# Patient Record
Sex: FEMALE | Race: WHITE | NOT HISPANIC OR LATINO | Employment: FULL TIME | URBAN - METROPOLITAN AREA
[De-identification: names, ages, dates, MRNs, and addresses within clinical notes are randomized per-mention and may not be internally consistent; named-entity substitution may affect disease eponyms.]

---

## 2017-02-17 LAB — PAP SMEAR - HIM PATIENT REPORTED: NEGATIVE

## 2017-11-16 ENCOUNTER — TRANSFERRED RECORDS (OUTPATIENT)
Dept: HEALTH INFORMATION MANAGEMENT | Facility: CLINIC | Age: 27
End: 2017-11-16

## 2017-11-16 LAB — HEP C HIM: NORMAL

## 2017-11-19 ENCOUNTER — OFFICE VISIT (OUTPATIENT)
Dept: URGENT CARE | Facility: URGENT CARE | Age: 27
End: 2017-11-19
Payer: COMMERCIAL

## 2017-11-19 VITALS
HEART RATE: 90 BPM | TEMPERATURE: 98.3 F | SYSTOLIC BLOOD PRESSURE: 107 MMHG | DIASTOLIC BLOOD PRESSURE: 65 MMHG | WEIGHT: 124.6 LBS | OXYGEN SATURATION: 98 %

## 2017-11-19 DIAGNOSIS — N90.60 LABIA ENLARGED: ICD-10-CM

## 2017-11-19 DIAGNOSIS — L30.9 DERMATITIS: Primary | ICD-10-CM

## 2017-11-19 PROCEDURE — 99213 OFFICE O/P EST LOW 20 MIN: CPT | Performed by: NURSE PRACTITIONER

## 2017-11-19 RX ORDER — NICOTINE POLACRILEX 2 MG
GUM BUCCAL
COMMUNITY
End: 2023-05-24

## 2017-11-19 RX ORDER — MULTIPLE VITAMINS W/ MINERALS TAB 9MG-400MCG
1 TAB ORAL DAILY
COMMUNITY
End: 2019-05-01

## 2017-11-19 RX ORDER — TRIAMCINOLONE ACETONIDE 1 MG/G
OINTMENT TOPICAL
Qty: 30 G | Refills: 0 | Status: SHIPPED | OUTPATIENT
Start: 2017-11-19 | End: 2021-12-22

## 2017-11-19 NOTE — MR AVS SNAPSHOT
"              After Visit Summary   2017    Bess Ellis    MRN: [id number]           Patient Information     Date Of Birth          1990        Visit Information        Provider Department      2017 11:40 AM Taty Glaser APRN CNP [facility]        Today's Diagnoses     Dermatitis    -  1    Labia enlarged           Follow-ups after your visit        Who to contact     If you have questions or need follow up information about today's clinic visit or your schedule please contact [facility] directly at [phone].  Normal or non-critical lab and imaging results will be communicated to you by [employer], letter or phone within 4 business days after the clinic has received the results. If you do not hear from us within 7 days, please contact the clinic through [employer] or phone. If you have a critical or abnormal lab result, we will notify you by phone as soon as possible.  Submit refill requests through [employer] or call your pharmacy and they will forward the refill request to us. Please allow 3 business days for your refill to be completed.          Additional Information About Your Visit        MyChart Information     [employer] lets you send messages to your doctor, view your test results, renew your prescriptions, schedule appointments and more. To sign up, go to [email or web address] . Click on \"Log in\" on the left side of the screen, which will take you to the Welcome page. Then click on \"Sign up Now\" on the right side of the page.     You will be asked to enter the access code listed below, as well as some personal information. Please follow the directions to create your username and password.     Your access code is: [email or web address]  Expires: 2018  5:31 AM     Your access code will  in 90 days. If you need help or a new code, please call your [facility] clinic or [phone].        Care EveryWhere ID     This is your Care " EveryWhere ID. This could be used by other organizations to access your [facility] medical records  [id number]        Your Vitals Were     Pulse Temperature Pulse Oximetry             90 98.3  F (36.8  C) (Oral) 98%          Blood Pressure from Last 3 Encounters:   11/19/17 107/65    Weight from Last 3 Encounters:   11/19/17 124 lb 9.6 oz (56.5 kg)              Today, you had the following     No orders found for display         Today's Medication Changes          These changes are accurate as of: 11/19/17  4:31 PM.  If you have any questions, ask your nurse or doctor.               Start taking these medicines.        Dose/Directions    triamcinolone 0.1 % ointment   Commonly known as:  KENALOG   Used for:  Dermatitis   Started by:  Taty Glaser APRN CNP        Apply sparingly to affected area three times daily for 14 days.   Quantity:  30 g   Refills:  0            Where to get your medicines      These medications were sent to [facility] [address] [place] [address] [address] [facility]  [address], [facility] [address]    Hours:  24-hours Phone:  [phone]     triamcinolone 0.1 % ointment                Primary Care Provider    Physician No Ref-Primary       NO REF-PRIMARY PHYSICIAN        Equal Access to Services     JIM PATEL AH: Hadii juliet ku hadasho Soomaali, waaxda luqadaha, qaybta kaalmada adeegyada, waxay kelsey lopez. So [facility] [phone].    ATENCIÓN: Si habla español, tiene a kiran disposición servicios gratuitos de asistencia lingüística. Llame al [phone].    We comply with applicable federal civil rights laws and [place] laws. We do not discriminate on the basis of race, color, national origin, age, disability, sex, sexual orientation, or gender identity.            Thank you!     Thank you for choosing [facility]  for your care. Our goal is always to provide you with excellent care. Hearing  back from our patients is one way we can continue to improve our services. Please take a few minutes to complete the written survey that you may receive in the mail after your visit with us. Thank you!             Your Updated Medication List - Protect others around you: Learn how to safely use, store and throw away your medicines at www.disposemymeds.org.          This list is accurate as of: 11/19/17  4:31 PM.  Always use your most recent med list.                   Brand Name Dispense Instructions for use Diagnosis    Biotin 1 MG Caps           Multi-vitamin Tabs tablet      Take 1 tablet by mouth daily        PROBIOTIC DAILY PO           triamcinolone 0.1 % ointment    KENALOG    30 g    Apply sparingly to affected area three times daily for 14 days.    Dermatitis

## 2017-11-19 NOTE — PROGRESS NOTES
SUBJECTIVE:   Bess Ellis is a 27 year old female who presents to clinic today for the following health issues:    CC: Bess presents today with new onset unilateral labial swelling and pain since intercourse early this morning.     HPI: Bess states that she went out with friends and had 3-4 barb's last night over the course of 4 hours, which she states is a typical amount for her, not excessive. She then had consensual, rough intercourse with a partner around 3 am (not a brand new partner, has had consensual intercourse once in the past). When she woke up at 5 am she noted that she had bilateral painful, labial swelling and had some dizziness. She drank some fluids and dizziness has since resolved, but she notes that she still has unilateral labial swelling and pain that has never happened before.     Problem list and histories reviewed & adjusted, as indicated.  Additional history: none    Reviewed and updated as needed this visit by clinical staff  Tobacco  Allergies       Reviewed and updated as needed this visit by Provider         ROS:  C: NEGATIVE for fever, chills, change in weight  E/M: NEGATIVE for ear, mouth and throat problems  R: NEGATIVE for significant cough or SOB  CV: NEGATIVE for chest pain, palpitations or peripheral edema  : negative for, dysuria, urinary tract infection and bleeding  MUSCULOSKELETAL: NEGATIVE for significant arthralgias or myalgia  NEURO: POSITIVE for dizziness/lightheadedness earlier this morning, since resoved  ENDOCRINE: NEGATIVE for temperature intolerance, skin/hair changes  PSYCHIATRIC: NEGATIVE for changes in mood or affect    OBJECTIVE:     /65  Pulse 90  Temp 98.3  F (36.8  C) (Oral)  Wt 124 lb 9.6 oz (56.5 kg)  SpO2 98%  There is no height or weight on file to calculate BMI.   GENERAL: healthy, alert and no distress  RESP: regular rate & rhythm, breathing unlabored  CV: regular rates and rhythm and no peripheral edema   (female): extensive left  labia minor edema, mild left labia majora edema, moderate superior internal vaginal edema - some associated erythema, no overt lacerations, lesions, or abnormal discharge.   MS: no gross musculoskeletal defects noted, no edema  SKIN: no suspicious lesions or rashes  NEURO: Normal strength and tone, mentation intact and speech normal  PSYCH: mentation appears normal, affect normal/bright    Diagnostic Test Results:  none     ASSESSMENT/PLAN:     Problem List Items Addressed This Visit        Urinary    Labia enlarged      Other Visit Diagnoses     Dermatitis    -  Primary    Relevant Medications    triamcinolone (KENALOG) 0.1 % ointment         Vaginal exam demonstrated unilateral left labial edema & mild erythema, no overt lacerations, lesions, or infection identified. Differential diagnoses include: irritation, less likely infection.    Prescribed triamcinolone cream for application to external swollen labia, pelvic elevation as much as possible and application of ice to genital region.     Bess states she just saw her gynecologist on Thursday - advised to return to gynecology if labial swelling worsens or does not improve in 1-2 days.     Encouraged to continue drinking fluids - no other overt concerns for dizziness as it has since resolved and likely related to dehydration s/t alcohol intake and anxiety with labial swelling.    Deepika Gutierrez, MALINDA student, acted as a scribe for the above evaluation completed by LAURE Weiss CNP.    I have reviewed and edited the NP student's documentation acting as scribe and verify that making the history, exam and medical decision making reflect the history, exam and medical decision making preformed by myself today.       LAURE Segundo CNP  [facility]

## 2017-11-19 NOTE — NURSING NOTE
Chief Complaint   Patient presents with     Vaginal Problem     Swelling x noticed this morning around 5 am. Pt states that she had intercorse yesterday. Pt also complains of feeling dizzy and vomiting this moring.      Diarrhea     x today       Initial /65  Pulse 90  Temp 98.3  F (36.8  C) (Oral)  Wt 124 lb 9.6 oz (56.5 kg)  SpO2 98% There is no height or weight on file to calculate BMI.  Medication Reconciliation: complete

## 2018-02-28 ENCOUNTER — OFFICE VISIT (OUTPATIENT)
Dept: FAMILY MEDICINE | Facility: CLINIC | Age: 28
End: 2018-02-28
Payer: COMMERCIAL

## 2018-02-28 ENCOUNTER — RESULT FOLLOW UP (OUTPATIENT)
Dept: FAMILY MEDICINE | Facility: CLINIC | Age: 28
End: 2018-02-28

## 2018-02-28 VITALS
HEIGHT: 60 IN | SYSTOLIC BLOOD PRESSURE: 119 MMHG | BODY MASS INDEX: 23.25 KG/M2 | WEIGHT: 118.4 LBS | TEMPERATURE: 97.7 F | HEART RATE: 68 BPM | DIASTOLIC BLOOD PRESSURE: 72 MMHG | OXYGEN SATURATION: 98 %

## 2018-02-28 DIAGNOSIS — Z80.6 FAMILY HISTORY OF CLL (CHRONIC LYMPHOID LEUKEMIA): ICD-10-CM

## 2018-02-28 DIAGNOSIS — Z97.5 IUD CONTRACEPTION: ICD-10-CM

## 2018-02-28 DIAGNOSIS — N90.60 LABIA ENLARGED: ICD-10-CM

## 2018-02-28 DIAGNOSIS — R87.610 ASCUS WITH POSITIVE HIGH RISK HPV CERVICAL: ICD-10-CM

## 2018-02-28 DIAGNOSIS — K58.8 OTHER IRRITABLE BOWEL SYNDROME: ICD-10-CM

## 2018-02-28 DIAGNOSIS — R87.810 ASCUS WITH POSITIVE HIGH RISK HPV CERVICAL: ICD-10-CM

## 2018-02-28 DIAGNOSIS — Z11.3 SCREEN FOR STD (SEXUALLY TRANSMITTED DISEASE): ICD-10-CM

## 2018-02-28 DIAGNOSIS — Z80.6: ICD-10-CM

## 2018-02-28 DIAGNOSIS — Z00.00 ENCOUNTER FOR ROUTINE ADULT HEALTH EXAMINATION WITHOUT ABNORMAL FINDINGS: Primary | ICD-10-CM

## 2018-02-28 LAB
BASOPHILS # BLD AUTO: 0 10E9/L (ref 0–0.2)
BASOPHILS NFR BLD AUTO: 0.2 %
DIFFERENTIAL METHOD BLD: NORMAL
EOSINOPHIL # BLD AUTO: 0.1 10E9/L (ref 0–0.7)
EOSINOPHIL NFR BLD AUTO: 1.4 %
ERYTHROCYTE [DISTWIDTH] IN BLOOD BY AUTOMATED COUNT: 12.2 % (ref 10–15)
HCT VFR BLD AUTO: 44.2 % (ref 35–47)
HGB BLD-MCNC: 15.7 G/DL (ref 11.7–15.7)
LYMPHOCYTES # BLD AUTO: 1.5 10E9/L (ref 0.8–5.3)
LYMPHOCYTES NFR BLD AUTO: 26.6 %
MCH RBC QN AUTO: 32.4 PG (ref 26.5–33)
MCHC RBC AUTO-ENTMCNC: 35.5 G/DL (ref 31.5–36.5)
MCV RBC AUTO: 91 FL (ref 78–100)
MONOCYTES # BLD AUTO: 0.5 10E9/L (ref 0–1.3)
MONOCYTES NFR BLD AUTO: 8.7 %
NEUTROPHILS # BLD AUTO: 3.5 10E9/L (ref 1.6–8.3)
NEUTROPHILS NFR BLD AUTO: 63.1 %
PLATELET # BLD AUTO: 203 10E9/L (ref 150–450)
RBC # BLD AUTO: 4.84 10E12/L (ref 3.8–5.2)
WBC # BLD AUTO: 5.5 10E9/L (ref 4–11)

## 2018-02-28 PROCEDURE — 36415 COLL VENOUS BLD VENIPUNCTURE: CPT | Performed by: FAMILY MEDICINE

## 2018-02-28 PROCEDURE — 85025 COMPLETE CBC W/AUTO DIFF WBC: CPT | Performed by: FAMILY MEDICINE

## 2018-02-28 PROCEDURE — 87491 CHLMYD TRACH DNA AMP PROBE: CPT | Performed by: FAMILY MEDICINE

## 2018-02-28 PROCEDURE — 99385 PREV VISIT NEW AGE 18-39: CPT | Performed by: FAMILY MEDICINE

## 2018-02-28 PROCEDURE — G0124 SCREEN C/V THIN LAYER BY MD: HCPCS | Performed by: FAMILY MEDICINE

## 2018-02-28 PROCEDURE — 87389 HIV-1 AG W/HIV-1&-2 AB AG IA: CPT | Performed by: FAMILY MEDICINE

## 2018-02-28 PROCEDURE — 87591 N.GONORRHOEAE DNA AMP PROB: CPT | Performed by: FAMILY MEDICINE

## 2018-02-28 PROCEDURE — 86780 TREPONEMA PALLIDUM: CPT | Performed by: FAMILY MEDICINE

## 2018-02-28 PROCEDURE — 87624 HPV HI-RISK TYP POOLED RSLT: CPT | Performed by: FAMILY MEDICINE

## 2018-02-28 PROCEDURE — G0145 SCR C/V CYTO,THINLAYER,RESCR: HCPCS | Performed by: FAMILY MEDICINE

## 2018-02-28 NOTE — MR AVS SNAPSHOT
After Visit Summary   2/28/2018    Bess Ellis    MRN: [id number]           Patient Information     Date Of Birth          1990        Visit Information        Provider Department      2/28/2018 8:30 AM Tati Gruber MD [facility]        Today's Diagnoses     Encounter for routine adult health examination without abnormal findings    -  1    IUD contraception        Screen for STD (sexually transmitted disease)          Care Instructions      Preventive Health Recommendations  Female Ages 26 - 39  Yearly exam:   See your health care provider every year in order to    Review health changes.     Discuss preventive care.      Review your medicines if you your doctor has prescribed any.    Until age 30: Get a Pap test every three years (more often if you have had an abnormal result).    After age 30: Talk to your doctor about whether you should have a Pap test every 3 years or have a Pap test with HPV screening every 5 years.   You do not need a Pap test if your uterus was removed (hysterectomy) and you have not had cancer.  You should be tested each year for STDs (sexually transmitted diseases), if you're at risk.   Talk to your provider about how often to have your cholesterol checked.  If you are at risk for diabetes, you should have a diabetes test (fasting glucose).  Shots: Get a flu shot each year. Get a tetanus shot every 10 years.   Nutrition:     Eat at least 5 servings of fruits and vegetables each day.    Eat whole-grain bread, whole-wheat pasta and brown rice instead of white grains and rice.    Talk to your provider about Calcium and Vitamin D.     Lifestyle    Exercise at least 150 minutes a week (30 minutes a day, 5 days of the week). This will help you control your weight and prevent disease.    Limit alcohol to one drink per day.    No smoking.     Wear sunscreen to prevent skin cancer.    See your dentist every six months for an exam and cleaning.             "Follow-ups after your visit        Who to contact     If you have questions or need follow up information about today's clinic visit or your schedule please contact [facility] directly at [phone].  Normal or non-critical lab and imaging results will be communicated to you by MyChart, letter or phone within 4 business days after the clinic has received the results. If you do not hear from us within 7 days, please contact the clinic through MyChart or phone. If you have a critical or abnormal lab result, we will notify you by phone as soon as possible.  Submit refill requests through [employer] or call your pharmacy and they will forward the refill request to us. Please allow 3 business days for your refill to be completed.          Additional Information About Your Visit        [employer] Information     [employer] lets you send messages to your doctor, view your test results, renew your prescriptions, schedule appointments and more. To sign up, go to [email or web address] . Click on \"Log in\" on the left side of the screen, which will take you to the Welcome page. Then click on \"Sign up Now\" on the right side of the page.     You will be asked to enter the access code listed below, as well as some personal information. Please follow the directions to create your username and password.     Your access code is: [email or web address]  Expires: 2018  9:05 AM     Your access code will  in 90 days. If you need help or a new code, please call your [facility] clinic or [phone].        Care EveryWhere ID     This is your Care EveryWhere ID. This could be used by other organizations to access your [facility] medical records  [id number]        Your Vitals Were     Pulse Temperature Height Pulse Oximetry Breastfeeding? BMI (Body Mass Index)    68 97.7  F (36.5  C) (Oral) 5' 0.43\" (1.535 m) 98% No 22.79 kg/m2       Blood Pressure from Last 3 Encounters:   18 119/72   17 107/65    Weight from Last 3 " Encounters:   02/28/18 118 lb 6.4 oz (53.7 kg)   11/19/17 124 lb 9.6 oz (56.5 kg)              We Performed the Following     Anti Treponema     CHLAMYDIA TRACHOMATIS PCR     HIV Antigen Antibody Combo     NEISSERIA GONORRHOEA PCR     PAP imaged thin layer screen reflex to HPV if ASCUS - recommended age 25 - 29 years        Primary Care Provider Fax #    Physician No Ref-Primary [phone]       No address on file        Equal Access to Services     JIM PATEL : Hadii aad ku hadasho Soomaali, waaxda luqadaha, qaybta kaalmada adeegyada, waxay deenain janan stef irishdiego yepez . So [facility] [phone].    ATENCIÓN: Si salty jaramillo, tiene a kiran disposición servicios gratuitos de asistencia lingüística. Llame al [phone].    We comply with applicable federal civil rights laws and [place] laws. We do not discriminate on the basis of race, color, national origin, age, disability, sex, sexual orientation, or gender identity.            Thank you!     Thank you for choosing [facility]  for your care. Our goal is always to provide you with excellent care. Hearing back from our patients is one way we can continue to improve our services. Please take a few minutes to complete the written survey that you may receive in the mail after your visit with us. Thank you!             Your Updated Medication List - Protect others around you: Learn how to safely use, store and throw away your medicines at www.disposemymeds.org.          This list is accurate as of 2/28/18  9:05 AM.  Always use your most recent med list.                   Brand Name Dispense Instructions for use Diagnosis    Biotin 1 MG Caps           Multi-vitamin Tabs tablet      Take 1 tablet by mouth daily        PROBIOTIC DAILY PO           triamcinolone 0.1 % ointment    KENALOG    30 g    Apply sparingly to affected area three times daily for 14 days.    Dermatitis

## 2018-02-28 NOTE — NURSING NOTE
"Chief Complaint   Patient presents with     Physical     /72  Pulse 68  Temp 97.7  F (36.5  C) (Oral)  Ht 5' 0.43\" (1.535 m)  Wt 118 lb 6.4 oz (53.7 kg)  SpO2 98%  Breastfeeding? No  BMI 22.79 kg/m2 Estimated body mass index is 22.79 kg/(m^2) as calculated from the following:    Height as of this encounter: 5' 0.43\" (1.535 m).    Weight as of this encounter: 118 lb 6.4 oz (53.7 kg).  BP completed using cuff size: regular       Health Maintenance due pending provider review:  Pap Smear    PAP--Possibly completing today, per Provider review    Justina Crenshaw MA  "

## 2018-02-28 NOTE — PROGRESS NOTES
SUBJECTIVE:   CC: Bess Ellis is an 28 year old woman who presents for preventive health visit.     Physical   Annual:     Getting at least 3 servings of Calcium per day::  Yes    Bi-annual eye exam::  Yes    Dental care twice a year::  Yes    Sleep apnea or symptoms of sleep apnea::  None    Diet::  Regular (no restrictions)    Frequency of exercise::  4-5 days/week    Duration of exercise::  45-60 minutes    Taking medications regularly::  Yes    Medication side effects::  None    Additional concerns today::  YES (STI testing)            New patient, establishing care.    --Health Maintenance-  Last pap likely ~3 yrs ago- in [place].    Was seen most recently at [facility] in Women's health- pap not done there as she wasn't due.  Last Tdap- thinks she's UTD.  Did do the HPV shots.  Did get the flu shot this year.  She would like to get STD screening.  Does it frequently, last a few months ago- negative.    Other health issues...    --GI sxs - now improved.  Sx's- much improved now, but was having diarrhea, colon spasms, more gas/bloating.  In past couple yrs, on/off GI problems with BM (intense/bad smelling- thinks it was caused by food, veggie burgers from  [employer]).  Saw GI recently ([facility]), no dx.    Did food allergy testing at [facility]- all negative.  GI - tested neg for celiac.  Rec fiber supplementation.  Did do the fiber supplementation- citrucel.   Also changed to priobiotics that were more GI related.    --Vaginal infection s/p IUD placement- now resolved.  Was seeing gyn due to vaginal issues, IUD.  Had bad yeast infection after IUD.    [facility]  Yeast infection x first 6 months.  Didn't clear with multiple diflucan, then topical which helped, but then UTI.  Now fine other than short strings.  Minimal bleeding, occasionally cramping, q5-6 wks.          Today's PHQ-2 Score:   PHQ-2 ( 1999 Pfizer) 2/28/2018   Q1: Little interest or pleasure in doing things 0    Q2: Feeling down, depressed or hopeless 0   PHQ-2 Score 0   Q1: Little interest or pleasure in doing things Not at all   Q2: Feeling down, depressed or hopeless Not at all   PHQ-2 Score 0     Abuse: Current or Past(Physical, Sexual or Emotional)- No  Do you feel safe in your environment - Yes    Social History   Substance Use Topics     Smoking status: Never Smoker     Smokeless tobacco: Never Used     Alcohol use Yes      Comment: 1-2 times a week couple drinks      No flowsheet data found.    Reviewed orders with patient.  Reviewed health maintenance and updated orders accordingly - Yes  Labs reviewed in EPIC  BP Readings from Last 3 Encounters:   18 119/72   17 107/65    Wt Readings from Last 3 Encounters:   18 118 lb 6.4 oz (53.7 kg)   17 124 lb 9.6 oz (56.5 kg)                  Patient Active Problem List   Diagnosis     Labia enlarged     IUD contraception     Past Surgical History:   Procedure Laterality Date     BIOPSY OF SKIN LESION  2017    atypical nevus, benign     LASIK  2013       Social History   Substance Use Topics     Smoking status: Never Smoker     Smokeless tobacco: Never Used     Alcohol use Yes      Comment: 1-2 times a week couple drinks      Family History   Problem Relation Age of Onset     CANCER Mother 57     CLL, donig well, no txt     GASTROINTESTINAL DISEASE Mother      IBS     GASTROINTESTINAL DISEASE Sister      IBS     Depression Sister      CANCER Maternal Grandmother 65     CML, had txt,  at 90, unsure cause of death     Coronary Artery Disease Maternal Grandfather      later in life     Colon Cancer Paternal Grandmother      60s     Coronary Artery Disease Paternal Grandfather      later in life     Colon Cancer Paternal Grandfather      60s         Current Outpatient Prescriptions   Medication Sig Dispense Refill     Biotin 1 MG CAPS        Probiotic Product (PROBIOTIC DAILY PO)        multivitamin, therapeutic with minerals (MULTI-VITAMIN)  "TABS tablet Take 1 tablet by mouth daily       triamcinolone (KENALOG) 0.1 % ointment Apply sparingly to affected area three times daily for 14 days. 30 g 0     No Known Allergies  No lab results found.     Mammogram not appropriate for this patient based on age.    Pertinent mammograms are reviewed under the imaging tab.  History of abnormal Pap smear: NO - age 21-29 PAP every 3 years recommended    Reviewed and updated as needed this visit by clinical staff  Tobacco  Allergies  Meds  Problems  Fam Hx  Soc Hx        Reviewed and updated as needed this visit by Provider  Allergies  Meds  Problems            Review of Systems  10 point ROS of systems including Constitutional, Eyes, Respiratory, Cardiovascular, Gastroenterology, Genitourinary, Integumentary, Muscularskeletal, Psychiatric were all negative except for pertinent positives noted in my HPI.       OBJECTIVE:   /72  Pulse 68  Temp 97.7  F (36.5  C) (Oral)  Ht 5' 0.43\" (1.535 m)  Wt 118 lb 6.4 oz (53.7 kg)  SpO2 98%  Breastfeeding? No  BMI 22.79 kg/m2  Physical Exam  GENERAL: healthy, alert and no distress  EYES: Eyes grossly normal to inspection, PERRL and conjunctivae and sclerae normal  HENT: ear canals and TM's normal, nose and mouth without ulcers or lesions  NECK: no adenopathy, no asymmetry, masses, or scars and thyroid normal to palpation  RESP: lungs clear to auscultation - no rales, rhonchi or wheezes  BREAST: normal without masses, tenderness or nipple discharge and no palpable axillary masses or adenopathy  CV: regular rate and rhythm, normal S1 S2, no S3 or S4, no murmur, click or rub, no peripheral edema and peripheral pulses strong  ABDOMEN: soft, nontender, no hepatosplenomegaly, no masses and bowel sounds normal   (female): normal female external genitalia, normal urethral meatus, vaginal mucosa pink, moist, well rugated, and normal cervix/adnexa/uterus without masses or discharge  MS: no gross musculoskeletal defects " "noted, no edema  SKIN: no suspicious lesions or rashes  NEURO: Normal strength and tone, mentation intact and speech normal  PSYCH: mentation appears normal, affect normal/bright    ASSESSMENT/PLAN:       ICD-10-CM    1. Encounter for routine adult health examination without abnormal findings Z00.00 PAP imaged thin layer screen reflex to HPV if ASCUS - recommended age 25 - 29 years   2. IUD contraception Z97.5    3. Screen for STD (sexually transmitted disease) Z11.3 NEISSERIA GONORRHOEA PCR     CHLAMYDIA TRACHOMATIS PCR     HIV Antigen Antibody Combo     Anti Treponema   4. Family history of CLL (chronic lymphoid leukemia) Z80.6 CBC with platelets and differential   5. Family history of chronic myeloid leukemia Z80.6 CBC with platelets and differential     CPE- Discussed diet, calcium and exercise.  Went over Self Breast Exam.  Thin prep pap was done.  Eyes and Teeth or UTD or recommended f/u.  No immunizations needed today.  See orders below for tests ordered and screening needed.  Will check STD screen today- no sx's.    GI sx's- mostly resolved, neg testing for celiac at GI, negative food allergy testing at allergy.  Pt unsure reason- could be diet changes or switch to different probiotic or something else.  Sx's mostly c/w IBS, and pt does have fam h/o IBS.    IUD- mirena working well now that initial yeast infection has cleared.  Strings almost 1cm.    Fam h/o CLL/CML in mother/MGM- will check baseline CBC today.      COUNSELING:  Reviewed preventive health counseling, as reflected in patient instructions         reports that she has never smoked. She has never used smokeless tobacco.    Estimated body mass index is 22.79 kg/(m^2) as calculated from the following:    Height as of this encounter: 5' 0.43\" (1.535 m).    Weight as of this encounter: 118 lb 6.4 oz (53.7 kg).       Counseling Resources:  ATP IV Guidelines  Pooled Cohorts Equation Calculator  Breast Cancer Risk Calculator  FRAX Risk Assessment  ICSI " Preventive Guidelines  Dietary Guidelines for Americans, 2010  USDA's MyPlate  ASA Prophylaxis  Lung CA Screening    Tati Gruber MD  [facility]

## 2018-03-01 PROBLEM — N90.60 LABIA ENLARGED: Status: ACTIVE | Noted: 2017-11-19

## 2018-03-01 PROBLEM — K58.8 OTHER IRRITABLE BOWEL SYNDROME: Status: ACTIVE | Noted: 2018-03-01

## 2018-03-01 PROBLEM — N90.60 LABIA ENLARGED: Status: RESOLVED | Noted: 2017-11-19 | Resolved: 2018-03-01

## 2018-03-01 LAB
C TRACH DNA SPEC QL NAA+PROBE: NEGATIVE
HIV 1+2 AB+HIV1 P24 AG SERPL QL IA: NONREACTIVE
N GONORRHOEA DNA SPEC QL NAA+PROBE: NEGATIVE
SPECIMEN SOURCE: NORMAL
SPECIMEN SOURCE: NORMAL
T PALLIDUM IGG+IGM SER QL: NEGATIVE

## 2018-03-01 NOTE — PROGRESS NOTES
Here are your lab results from your recent visit...  -Your STD labs (gonorrhea, chlamydia, HIV and syphilis) are all negative.  -Your CBC labs (which includes blood labs looking for signs of infection or anemia) are completely normal.    Please let me know if you have any questions.  Best,   Nael Gruber MD

## 2018-03-02 LAB
COPATH REPORT: ABNORMAL
PAP: ABNORMAL

## 2018-03-06 ENCOUNTER — OFFICE VISIT (OUTPATIENT)
Dept: FAMILY MEDICINE | Facility: CLINIC | Age: 28
End: 2018-03-06
Payer: COMMERCIAL

## 2018-03-06 VITALS
TEMPERATURE: 97.5 F | RESPIRATION RATE: 14 BRPM | DIASTOLIC BLOOD PRESSURE: 64 MMHG | HEIGHT: 60 IN | WEIGHT: 118 LBS | HEART RATE: 78 BPM | SYSTOLIC BLOOD PRESSURE: 110 MMHG | BODY MASS INDEX: 23.16 KG/M2 | OXYGEN SATURATION: 100 %

## 2018-03-06 DIAGNOSIS — R87.610 ASCUS WITH POSITIVE HIGH RISK HPV CERVICAL: Primary | ICD-10-CM

## 2018-03-06 DIAGNOSIS — Z01.812 PRE-PROCEDURE LAB EXAM: ICD-10-CM

## 2018-03-06 DIAGNOSIS — R87.810 ASCUS WITH POSITIVE HIGH RISK HPV CERVICAL: Primary | ICD-10-CM

## 2018-03-06 LAB
BETA HCG QUAL IFA URINE: NEGATIVE
FINAL DIAGNOSIS: ABNORMAL
HPV HR 12 DNA CVX QL NAA+PROBE: POSITIVE
HPV16 DNA SPEC QL NAA+PROBE: NEGATIVE
HPV18 DNA SPEC QL NAA+PROBE: NEGATIVE
SPECIMEN DESCRIPTION: ABNORMAL
SPECIMEN SOURCE CVX/VAG CYTO: ABNORMAL

## 2018-03-06 PROCEDURE — 84703 CHORIONIC GONADOTROPIN ASSAY: CPT | Performed by: FAMILY MEDICINE

## 2018-03-06 PROCEDURE — 57455 BIOPSY OF CERVIX W/SCOPE: CPT | Performed by: FAMILY MEDICINE

## 2018-03-06 PROCEDURE — 88305 TISSUE EXAM BY PATHOLOGIST: CPT | Performed by: FAMILY MEDICINE

## 2018-03-06 NOTE — PROGRESS NOTES
SUBJECTIVE:   Bess Ellis is a 28 year old female who presents to clinic today for the following health issues:    Bess Ellis is a 28 year old female who presents for initial colposcopy, referred by myself. Pap smear 1 months ago showed: ASC-US with HR HPV (not 16/18). The prior pap showed normal.     Patient Active Problem List    Diagnosis Date Noted     ASCUS with positive high risk HPV cervical 2018     Priority: Medium     18: Ascus pap, with + HR HPV (not 16 or 18) result. Plan [place].        Other irritable bowel syndrome 2018     Priority: Medium     GI w/u negative, including celiac lab panel.  Food allergy testing negative at allergy.  Sx's improved, unsure if from probiotics or diet changes.  Fam h/o IBS.       IUD contraception 2018     Priority: Medium     [facility].  Initially had yeast infx issues x 6 months, but cleared and fine now.          Past Medical History:   Diagnosis Date     Abnormal Pap smear of cervix 2018: see problem list.     Family History   Problem Relation Age of Onset     CANCER Mother 57     CLL, donig well, no txt     GASTROINTESTINAL DISEASE Mother      IBS     GASTROINTESTINAL DISEASE Sister      IBS     Depression Sister      CANCER Maternal Grandmother 65     CML, had txt,  at 90, unsure cause of death     Coronary Artery Disease Maternal Grandfather      later in life     Colon Cancer Paternal Grandmother      60s     Coronary Artery Disease Paternal Grandfather      later in life     Colon Cancer Paternal Grandfather      60s       Previous history of abnormal paps?: No  History of cryotherapy (freezing)?: : No  History of veneral diseases: : Yes - chlamydia in , treated  Do you desire testing for any of these diseases? : No  History of genital warts:  No  Visible warts now?:  No  Previously treated? If so, how?:  No     No LMP recorded. Patient is not currently having periods  (Reason: IUD).  Type of contraception: IUD Mirena and condoms  Age at first sexual intercourse: 21  Number of sexual partners (lifetime): 10  History   Smoking Status     Never Smoker   Smokeless Tobacco     Never Used     History of sexual abuse:  No  Allergies as of 03/06/2018     (No Known Allergies)        PROCEDURE:  Before the procedure, it was ensured that the patient was educated regarding the nature of her findings to date, the implications of them, and what was to be done. She has been made aware of the role of HPV, the natural history of infection, ways to minimize her future risk, the effect of HPV on the cervix, and treatment options available should they be indicated. The   pathophysiology of the cervix, including a discussion of squamous vs. endometrial cells, and squamous metaplasia have all been reviewed, using illustrations and sketches. The details of the colposcopic procedure were reviewed, as well as the risks of missed diagnoses, pain, infection and bleeding. All questions were answered before proceeding, and informed consent was therefore obtained.    Bimanual examination: was not done  Unenhanced examination of the cervix was normal  Please refer to images section for details!  Pap repeated?:  No  SCJ seen?:  yes  Endocervical speculum needed?:  No  ECC done?:  No  Lugol's solution used?:  Yes   Satisfactory examination?:  yes    Vaginal vault: normal to cursory inspection   Urethra normal?:  yes  Labia normal?:  yes  Perineum normal?:  yes  Rectum normal?:  yes    FINDINGS:  Please see image   Cervix: acetowhite area(s) at 11-1:00, and at 5-6:00 (with fine mosaicism)  Procedure: biopsies taken (not including ECC): 2.    Procedure summary: Patient tolerated procedure well     Assessment: HPV related changes    Plan: Specimens labelled and sent to pathology.   Will base further treatment on pathology findings.   Treatment options discussed with patient.    Will try and call patient with  results.  She's leaving the country next Thursday (noon)- will call Wed to see if results are back and leave times that are good to reach her.

## 2018-03-06 NOTE — PROGRESS NOTES
02/28/18: Ascus pap, with + HR HPV (not 16 or 18) result. Plan [place] due bef 05/28/18.   03/06/18: Pt was advised.  03/06/18 [place] Bx--JOSE 1. Plan: co-test in 1 year. ([facility])  03/05/19: NIL pap, + HR HPV (not 16 or 18) result. Plan [place], due bef 06/05/19.   03/13/19:Msg left to call back. Pt was advised.  05/1/19: [place] Bx Focal squamous atypia of undetermined significance. ECC: A small amount of atypical squamous epithelium, favor LSIL. Plan:Cotest in 1 year. Provider informed the pt.   3/11/20 NIL pap, + HR HPV (not 16/18). Plan: colpo  3/18/20 Pt notified and will call to schedule colp. ([facility])  04/22/20:Per provider, COVID 19 interim plan updated to: [place] due bef 09/11/20. See [email or web address] message from the pt on 04/21/20. I sent a [email or web address] response to the pt with these recommendations. Pt viewed [email or web address] message.  6/3/20 Reminder call.  Patient will schedule at [place]. [facility] closed. Now tracking in CCT

## 2018-03-06 NOTE — MR AVS SNAPSHOT
"              After Visit Summary   3/6/2018    Bess Ellis    MRN: [id number]           Patient Information     Date Of Birth          1990        Visit Information        Provider Department      3/6/2018 3:30 PM Tati Gruber MD; UP PROC RM 1 [facility]        Today's Diagnoses     ASCUS with positive high risk HPV cervical    -  1    Pre-procedure lab exam           Follow-ups after your visit        Who to contact     If you have questions or need follow up information about today's clinic visit or your schedule please contact [facility] directly at [phone].  Normal or non-critical lab and imaging results will be communicated to you by SHADOhart, letter or phone within 4 business days after the clinic has received the results. If you do not hear from us within 7 days, please contact the clinic through SHADOhart or phone. If you have a critical or abnormal lab result, we will notify you by phone as soon as possible.  Submit refill requests through DirectLaw or call your pharmacy and they will forward the refill request to us. Please allow 3 business days for your refill to be completed.          Additional Information About Your Visit        MyChart Information     DirectLaw gives you secure access to your electronic health record. If you see a primary care provider, you can also send messages to your care team and make appointments. If you have questions, please call your primary care clinic.  If you do not have a primary care provider, please call [phone] and they will assist you.        Care EveryWhere ID     This is your Care EveryWhere ID. This could be used by other organizations to access your [place] medical records  [id number]        Your Vitals Were     Pulse Temperature Respirations Height Pulse Oximetry Breastfeeding?    78 97.5  F (36.4  C) (Tympanic) 14 5' 0.25\" (1.53 m) 100% No    BMI (Body Mass Index)                   22.85 kg/m2            Blood " Pressure from Last 3 Encounters:   03/06/18 110/64   02/28/18 119/72   11/19/17 107/65    Weight from Last 3 Encounters:   03/06/18 118 lb (53.5 kg)   02/28/18 118 lb 6.4 oz (53.7 kg)   11/19/17 124 lb 9.6 oz (56.5 kg)              We Performed the Following     Beta HCG Qual, Urine - [facility] and [place] ([id number])     COLP CERVIX/UPPER VAGINA W BX CERVIX/ENDOCERV CURETT     Surgical pathology exam        Primary Care Provider Office Phone # Fax #    Tati Gruber [phone]       [address] [address]        Equal Access to Services     JIM PATEL : Hadii juliet mcfarlane hadasho Somiyaali, waaxda luqadaha, qaybta kaalmada adeegyada, ascencion shafferin catalina yepez . So [facility] [phone].    ATENCIÓN: Si habla español, tiene a kiran disposición servicios gratuitos de asistencia lingüística. [facility] [phone].    We comply with applicable federal civil rights laws and [place] laws. We do not discriminate on the basis of race, color, national origin, age, disability, sex, sexual orientation, or gender identity.            Thank you!     Thank you for choosing [facility]  for your care. Our goal is always to provide you with excellent care. Hearing back from our patients is one way we can continue to improve our services. Please take a few minutes to complete the written survey that you may receive in the mail after your visit with us. Thank you!             Your Updated Medication List - Protect others around you: Learn how to safely use, store and throw away your medicines at www.disposemymeds.org.          This list is accurate as of 3/6/18  4:50 PM.  Always use your most recent med list.                   Brand Name Dispense Instructions for use Diagnosis    Biotin 1 MG Caps           Multi-vitamin Tabs tablet      Take 1 tablet by mouth daily        PROBIOTIC DAILY PO           triamcinolone 0.1 % ointment    KENALOG    30 g    Apply sparingly to  affected area three times daily for 14 days.    Dermatitis

## 2018-03-12 LAB — COPATH REPORT: NORMAL

## 2018-03-14 ENCOUNTER — TELEPHONE (OUTPATIENT)
Dept: FAMILY MEDICINE | Facility: CLINIC | Age: 28
End: 2018-03-14

## 2018-03-14 DIAGNOSIS — R87.610 ASCUS WITH POSITIVE HIGH RISK HPV CERVICAL: ICD-10-CM

## 2018-03-14 DIAGNOSIS — R87.810 ASCUS WITH POSITIVE HIGH RISK HPV CERVICAL: ICD-10-CM

## 2018-03-14 NOTE — TELEPHONE ENCOUNTER
Called pt and discussed results-   One bx fine, one with mild dysplasia.  Discussed f/u of pap/HPV cotest in a year at her next physical.  Discussed other questions about HPV- all questions answered.  Will update problem list/HM.  CW

## 2018-03-14 NOTE — TELEPHONE ENCOUNTER
Pt calling back regarding lab results.  She is leaving the country tomorrow for 2 wks.  She can be reached at [phone]. Ok to leave a detailed message.

## 2018-03-14 NOTE — TELEPHONE ENCOUNTER
Reason for Call:  Request for results:    Name of test or procedure: all labs    Date of test of procedure: 03/06    OK to leave the result message on voice mail or with a family member? YES    Phone number Patient can be reached at:  Home number on file [phone] (home)      Call taken on 3/14/2018 at 8:33 AM by Joe Arceo

## 2018-05-18 ENCOUNTER — RADIANT APPOINTMENT (OUTPATIENT)
Dept: GENERAL RADIOLOGY | Facility: CLINIC | Age: 28
End: 2018-05-18
Attending: FAMILY MEDICINE
Payer: COMMERCIAL

## 2018-05-18 ENCOUNTER — OFFICE VISIT (OUTPATIENT)
Dept: FAMILY MEDICINE | Facility: CLINIC | Age: 28
End: 2018-05-18
Payer: COMMERCIAL

## 2018-05-18 VITALS
DIASTOLIC BLOOD PRESSURE: 69 MMHG | RESPIRATION RATE: 16 BRPM | WEIGHT: 123.3 LBS | TEMPERATURE: 98.5 F | OXYGEN SATURATION: 99 % | BODY MASS INDEX: 23.88 KG/M2 | SYSTOLIC BLOOD PRESSURE: 107 MMHG | HEART RATE: 66 BPM

## 2018-05-18 DIAGNOSIS — M79.672 LEFT FOOT PAIN: ICD-10-CM

## 2018-05-18 DIAGNOSIS — M79.672 LEFT FOOT PAIN: Primary | ICD-10-CM

## 2018-05-18 PROCEDURE — 73630 X-RAY EXAM OF FOOT: CPT | Mod: LT

## 2018-05-18 PROCEDURE — 99214 OFFICE O/P EST MOD 30 MIN: CPT | Performed by: FAMILY MEDICINE

## 2018-05-18 NOTE — MR AVS SNAPSHOT
After Visit Summary   5/18/2018    Bess Ellis    MRN: [id number]           Patient Information     Date Of Birth          1990        Visit Information        Provider Department      5/18/2018 12:30 PM Chelle Coles MD [facility]        Today's Diagnoses     Left foot pain    -  1       Follow-ups after your visit        Who to contact     If you have questions or need follow up information about today's clinic visit or your schedule please contact [facility] directly at [phone].  Normal or non-critical lab and imaging results will be communicated to you by Pixium Visionhart, letter or phone within 4 business days after the clinic has received the results. If you do not hear from us within 7 days, please contact the clinic through OMGPOPt or phone. If you have a critical or abnormal lab result, we will notify you by phone as soon as possible.  Submit refill requests through COINLAB or call your pharmacy and they will forward the refill request to us. Please allow 3 business days for your refill to be completed.          Additional Information About Your Visit        MyChart Information     COINLAB gives you secure access to your electronic health record. If you see a primary care provider, you can also send messages to your care team and make appointments. If you have questions, please call your primary care clinic.  If you do not have a primary care provider, please call [phone] and they will assist you.        Care EveryWhere ID     This is your Care EveryWhere ID. This could be used by other organizations to access your [place] medical records  [id number]        Your Vitals Were     Pulse Temperature Respirations Pulse Oximetry BMI (Body Mass Index)       66 98.5  F (36.9  C) (Tympanic) 16 99% 23.88 kg/m2        Blood Pressure from Last 3 Encounters:   05/18/18 107/69   03/06/18 110/64   02/28/18 119/72    Weight from Last 3 Encounters:   05/18/18 123 lb 4.8 oz  (55.9 kg)   03/06/18 118 lb (53.5 kg)   02/28/18 118 lb 6.4 oz (53.7 kg)               Primary Care Provider Office Phone # Fax #    Tati Gruber [phone] [address] [address]        Equal Access to Services     [facility]: Hadii aad ku hadasho Soomaali, waaxda luqadaha, qaybta kaalmada adeegyada, waxay idiin hayaan adeeg kharash la'aan . So [facility] [phone].    ATENCIÓN: Si habla español, tiene a kiran disposición servicios gratuitos de asistencia lingüística. Debbieame al [phone].    We comply with applicable federal civil rights laws and [place] laws. We do not discriminate on the basis of race, color, national origin, age, disability, sex, sexual orientation, or gender identity.            Thank you!     Thank you for choosing [facility]  for your care. Our goal is always to provide you with excellent care. Hearing back from our patients is one way we can continue to improve our services. Please take a few minutes to complete the written survey that you may receive in the mail after your visit with us. Thank you!             Your Updated Medication List - Protect others around you: Learn how to safely use, store and throw away your medicines at www.disposemymeds.org.          This list is accurate as of 5/18/18  7:24 PM.  Always use your most recent med list.                   Brand Name Dispense Instructions for use Diagnosis    Biotin 1 MG Caps           Multi-vitamin Tabs tablet      Take 1 tablet by mouth daily        PROBIOTIC DAILY PO           triamcinolone 0.1 % ointment    KENALOG    30 g    Apply sparingly to affected area three times daily for 14 days.    Dermatitis

## 2018-05-18 NOTE — PROGRESS NOTES
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, lots of jumping , dancing etc , does not remember any injuries , worried about fracture- pain under the lateral malleolus on left leg but no swelling       Duration: x 3 days     Description  Location: left foot pain on the outside of the left foot     Intensity:  moderate    Accompanying signs and symptoms: tingling, redness and pain when trying to stretch the foot     History  Previous similar problem: no   Previous evaluation:  none    Precipitating or alleviating factors:  Trauma or overuse: YES- 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  Additional history: as documented    Patient Active Problem List   Diagnosis     IUD contraception     Other irritable bowel syndrome     ASCUS with positive high risk HPV cervical     Past Surgical History:   Procedure Laterality Date     BIOPSY OF SKIN LESION  2017    atypical nevus, benign     LASIK  2013       Social History   Substance Use Topics     Smoking status: Never Smoker     Smokeless tobacco: Never Used     Alcohol use Yes      Comment: 1-2 times a week couple drinks      Family History   Problem Relation Age of Onset     CANCER Mother 57     CLL, donig well, no txt     GASTROINTESTINAL DISEASE Mother      IBS     GASTROINTESTINAL DISEASE Sister      IBS     Depression Sister      CANCER Maternal Grandmother 65     CML, had txt,  at 90, unsure cause of death     Coronary Artery Disease Maternal Grandfather      later in life      Colon Cancer Paternal Grandmother      60s     Coronary Artery Disease Paternal Grandfather      later in life     Colon Cancer Paternal Grandfather      60s         Current Outpatient Prescriptions   Medication Sig Dispense Refill     Biotin 1 MG CAPS        multivitamin, therapeutic with minerals (MULTI-VITAMIN) TABS tablet Take 1 tablet by mouth daily       Probiotic Product (PROBIOTIC DAILY PO)        triamcinolone (KENALOG) 0.1 % ointment Apply sparingly to affected area three times daily for 14 days. (Patient not taking: Reported on 5/18/2018) 30 g 0     No Known Allergies  No lab results found.   BP Readings from Last 3 Encounters:   05/18/18 107/69   03/06/18 110/64   02/28/18 119/72    Wt Readings from Last 3 Encounters:   05/18/18 123 lb 4.8 oz (55.9 kg)   03/06/18 118 lb (53.5 kg)   02/28/18 118 lb 6.4 oz (53.7 kg)                  Labs reviewed in EPIC    Reviewed and updated as needed this visit by clinical staff  Tobacco  Allergies  Meds  Surg Hx  Fam Hx       Reviewed and updated as needed this visit by Provider         ROS:  Constitutional, HEENT, cardiovascular, pulmonary, GI, , musculoskeletal, neuro, skin, endocrine and psych systems are negative, except as otherwise noted.    OBJECTIVE:     /69  Pulse 66  Temp 98.5  F (36.9  C) (Tympanic)  Resp 16  Wt 123 lb 4.8 oz (55.9 kg)  SpO2 99%  BMI 23.88 kg/m2  Body mass index is 23.88 kg/(m^2).  GENERAL: healthy, alert and no distress  EYES: Eyes grossly normal to inspection, PERRL and conjunctivae and sclerae normal  NECK: no adenopathy, no asymmetry, masses, or scars and thyroid normal to palpation  MS: no gross musculoskeletal defects noted, no edema EXCEPT there is some tenderness with palpation on the lateral part of left foot , under the malleolus but no edema , no erythema and is able to bear weight     Diagnostic Test Results:  Xray - left foot , normal     ASSESSMENT/PLAN:       1. Left foot pain  I do not see any  fractures on the x ray , could be ligament sprain/tear. Discussed ice three x a day for about 10 to 15 min at a time, elevate at night rest , no dancing for a week, ACE wrap for comfort and support.RTC if no improving or worsening.  Pt is aware  and comfortable with the current plan.    - XR Foot Left G/E 3 Views; Future    l    Chelle Coles MD  [facility]

## 2018-05-18 NOTE — NURSING NOTE
"Chief Complaint   Patient presents with     Musculoskeletal Problem     left foot pain x 3days ago      Initial /69  Pulse 66  Temp 98.5  F (36.9  C) (Tympanic)  Resp 16  Wt 123 lb 4.8 oz (55.9 kg)  SpO2 99%  BMI 23.88 kg/m2 Estimated body mass index is 23.88 kg/(m^2) as calculated from the following:    Height as of 3/6/18: 5' 0.25\" (1.53 m).    Weight as of this encounter: 123 lb 4.8 oz (55.9 kg).  BP completed using cuff size: regular. R arm       Health Maintenance that is potentially due pending provider review:   NONE    n/a       Brigid Olmos CMA     "

## 2018-11-19 ENCOUNTER — TRANSFERRED RECORDS (OUTPATIENT)
Dept: HEALTH INFORMATION MANAGEMENT | Facility: CLINIC | Age: 28
End: 2018-11-19

## 2018-11-21 NOTE — TELEPHONE ENCOUNTER
FUTURE VISIT INFORMATION      FUTURE VISIT INFORMATION:    Date: 11/27/18    Time: 900am    Location: [facility]  REFERRAL INFORMATION:    Referring provider:  MICHAEL TROY    Referring providers clinic:  [facility]    Reason for visit/diagnosis  Left eye lid stye     RECORDS REQUESTED FROM:       Clinic name Comments Records Status Imaging Status   [facility] Notes sent to HIM on 11/21/18 Sent to HIM

## 2018-11-27 ENCOUNTER — OFFICE VISIT (OUTPATIENT)
Dept: OPHTHALMOLOGY | Facility: CLINIC | Age: 28
End: 2018-11-27
Payer: COMMERCIAL

## 2018-11-27 ENCOUNTER — PRE VISIT (OUTPATIENT)
Dept: OPHTHALMOLOGY | Facility: CLINIC | Age: 28
End: 2018-11-27

## 2018-11-27 DIAGNOSIS — H00.14 CHALAZION OF LEFT UPPER EYELID: Primary | ICD-10-CM

## 2018-11-27 ASSESSMENT — SLIT LAMP EXAM - LIDS: COMMENTS: NORMAL

## 2018-11-27 ASSESSMENT — VISUAL ACUITY
OD_SC: 20/30
OS_SC: 20/25
METHOD: SNELLEN - LINEAR

## 2018-11-27 ASSESSMENT — TONOMETRY
IOP_METHOD: ICARE
OD_IOP_MMHG: 14
OS_IOP_MMHG: 14

## 2018-11-27 ASSESSMENT — CONF VISUAL FIELD
METHOD: COUNTING FINGERS
OS_NORMAL: 1
OD_NORMAL: 1

## 2018-11-27 NOTE — LETTER
" 2018         RE:  :  MRN: Bess Ellis  1990  [id number]     Dear Dr. Titus,    Thank you for asking me to see your patient, Bess Ellis, for an oculoplastic   consultation.  My assessment and plan are below.  For further details, please see my attached clinic note.          Chief Complaints and History of Present Illnesses   Patient presents with     Consult For     Chalazion Left upper lid     For about one month  Has been hot packing aggressively and \"burned\" her eyelid causing a small blister  The stye burst last week and seems to be getting smaller    Has used doxcycline, keflex, and maxitrol for 2  weeks    Assessment & Plan     Bess Ellis is a 28 year old female with the following diagnoses:   1. Chalazion of left upper eyelid       Improving, drained spontaneously  Continue warm compresses  One more week of maxitrol   F/u as needed  Discussed lid hygiene to prevent in the future.       Again, thank you for allowing me to participate in the care of your patient.      Sincerely,    Laurie Morrison MD  Department of Ophthalmology and Visual Neurosciences  [facility]    CC: Jay Titus  [address] [address]  VIA Facsimile: [phone]     "

## 2018-11-27 NOTE — PROGRESS NOTES
"       Chief Complaints and History of Present Illnesses   Patient presents with     Consult For     Chalazion Left upper lid     For about one month  Has been hot packing aggressively and \"burned\" her eyelid causing a small blister  The stye burst last week and seems to be getting smaller    Has used doxcycline, keflex, and maxitrol for 2  weeks    Assessment & Plan     Bess Ellis is a 28 year old female with the following diagnoses:   1. Chalazion of left upper eyelid       Improving, drained spontaneously  Continue warm compresses  One more week of maxitrol   F/u as needed  Discussed lid hygiene to prevent in the future.        Attending Physician Attestation:  Complete documentation of historical and exam elements from today's encounter can be found in the full encounter summary report (not reduplicated in this progress note).  I personally obtained the chief complaint(s) and history of present illness.  I confirmed and edited as necessary the review of systems, past medical/surgical history, family history, social history, and examination findings as documented by others; and I examined the patient myself.  I personally reviewed the relevant tests, images, and reports as documented above.  I formulated and edited as necessary the assessment and plan and discussed the findings and management plan with the patient and family. - Laurie Morrison MD        "

## 2018-11-27 NOTE — MR AVS SNAPSHOT
After Visit Summary   11/27/2018    Bess Ellis    MRN: [id number]           Patient Information     Date Of Birth          1990        Visit Information        Provider Department      11/27/2018 9:00 AM Laurie Morrison MD [facility]        Today's Diagnoses     Chalazion of left upper eyelid    -  1       Follow-ups after your visit        Who to contact     Please call your clinic at [phone] to:    Ask questions about your health    Make or cancel appointments    Discuss your medicines    Learn about your test results    Speak to your doctor            Additional Information About Your Visit        MyChart Information     [employer] gives you secure access to your electronic health record. If you see a primary care provider, you can also send messages to your care team and make appointments. If you have questions, please call your primary care clinic.  If you do not have a primary care provider, please call [phone] and they will assist you.      [employer] is an electronic gateway that provides easy, online access to your medical records. With [employer], you can request a clinic appointment, read your test results, renew a prescription or communicate with your care team.     To access your existing account, please contact your [facility] or call [phone] for assistance.        Care EveryWhere ID     This is your Care EveryWhere ID. This could be used by other organizations to access your [facility] medical records  [id number]         Blood Pressure from Last 3 Encounters:   05/18/18 107/69   03/06/18 110/64   02/28/18 119/72    Weight from Last 3 Encounters:   05/18/18 55.9 kg (123 lb 4.8 oz)   03/06/18 53.5 kg (118 lb)   02/28/18 53.7 kg (118 lb 6.4 oz)              Today, you had the following     No orders found for display       Primary Care Provider Office Phone # Fax #    Tati Gruber [phone]  [facility]  [address]  [facility] [address]        Equal Access to Services     JIM PATEL : Hadii aad ku hadofeliaphyllis Srinivasan, waandradenic shields, rosaludwin damianmanic roberts, ascencion jeffersonjoandiego lopez. So [facility] [phone].    ATENCIÓN: Si habla español, tiene a krian disposición servicios gratuitos de asistencia lingüística. Llame al [phone].    We comply with applicable federal civil rights laws and [place] laws. We do not discriminate on the basis of race, color, national origin, age, disability, sex, sexual orientation, or gender identity.            Thank you!     Thank you for choosing [facility] OPHTH[facility]  for your care. Our goal is always to provide you with excellent care. Hearing back from our patients is one way we can continue to improve our services. Please take a few minutes to complete the written survey that you may receive in the mail after your visit with us. Thank you!             Your Updated Medication List - Protect others around you: Learn how to safely use, store and throw away your medicines at www.disposemymeds.org.          This list is accurate as of 11/27/18  9:22 AM.  Always use your most recent med list.                   Brand Name Dispense Instructions for use Diagnosis    Biotin 1 MG Caps           Multi-vitamin tablet      Take 1 tablet by mouth daily        PROBIOTIC DAILY PO           triamcinolone 0.1 % ointment    KENALOG    30 g    Apply sparingly to affected area three times daily for 14 days.    Dermatitis

## 2018-11-27 NOTE — NURSING NOTE
Chief Complaints and History of Present Illnesses   Patient presents with     Consult For     Chalazion Left upper lid     HPI    Affected eye(s):  Left   Symptoms:     No blurred vision      Frequency:  Constant       Do you have eye pain now?:  No      Comments:  Pt states Chalazion appeared about 1 month ago, and has been using a steroid ointment. Pt was using warm compresses up until last Monday. This last Friday it popped and had some bloody discharge. Pt states there is still a slight bump on the upper lid but it has gone down in size. No pain.     Margie RANDOLPH 9:03 AM November 27, 2018

## 2018-12-17 ENCOUNTER — OFFICE VISIT (OUTPATIENT)
Dept: FAMILY MEDICINE | Facility: CLINIC | Age: 28
End: 2018-12-17
Payer: COMMERCIAL

## 2018-12-17 ENCOUNTER — APPOINTMENT (OUTPATIENT)
Dept: LAB | Facility: CLINIC | Age: 28
End: 2018-12-17
Payer: COMMERCIAL

## 2018-12-17 VITALS
SYSTOLIC BLOOD PRESSURE: 102 MMHG | RESPIRATION RATE: 16 BRPM | HEART RATE: 65 BPM | BODY MASS INDEX: 23.56 KG/M2 | HEIGHT: 60 IN | OXYGEN SATURATION: 98 % | DIASTOLIC BLOOD PRESSURE: 69 MMHG | TEMPERATURE: 98.3 F | WEIGHT: 120 LBS

## 2018-12-17 DIAGNOSIS — K62.5 RECTAL BLEEDING: Primary | ICD-10-CM

## 2018-12-17 PROCEDURE — 99213 OFFICE O/P EST LOW 20 MIN: CPT | Performed by: FAMILY MEDICINE

## 2018-12-17 PROCEDURE — 82272 OCCULT BLD FECES 1-3 TESTS: CPT | Performed by: FAMILY MEDICINE

## 2018-12-17 ASSESSMENT — MIFFLIN-ST. JEOR: SCORE: 1209.79

## 2018-12-17 NOTE — PROGRESS NOTES
"  SUBJECTIVE:   Bess Ellis is a 28 year old female who presents to clinic today for the following health issues:      ABDOMINAL PAIN     Onset: 2 month ago first episode but this episode started last week Thursday     Description:   Character: \"intestinal are working too hard\"  Location: right upper quadrant left upper quadrant right lower quadrant left lower quadrant  Radiation: None    Intensity: moderate    Progression of Symptoms:  intermittent    Accompanying Signs & Symptoms:  Fever/Chills?: no   Gas/Bloating: YES  Nausea: YES  Vomitting: no   Diarrhea?: YES  Constipation:no   Dysuria or Hematuria: No    History:   Trauma: no   Previous similar pain: YES- 2 months ago    Previous tests done: none    Precipitating factors:   Does the pain change with:     Food: no      BM: no     Urination: no     Alleviating factors:      Therapies Tried and outcome: pepto bismol    LMP:  Pt has an IUD    28-year-old who presents to clinic for evaluation of rectal bleeding and dark stools.  The patient had an upper abdominal pain with diarrhea x1 5 days ago.  She also reports a single episode of bright red bleeding per rectum.  Bleeding was mainly around the stool.  Dark stools times 2 days.  The patient reports taking multivitamin as well as Pepto-Bismol the days prior.    Currently not sexually active.  Has an IUD in place.    IUD in place  - No cycles.     Some family members have been diagnosed with IBS.    Patient Active Problem List   Diagnosis     IUD contraception     Other irritable bowel syndrome     ASCUS with positive high risk HPV cervical     Past Surgical History:   Procedure Laterality Date     BIOPSY OF SKIN LESION  07/2017    atypical nevus, benign     LASIK  07/2013       Social History     Tobacco Use     Smoking status: Never Smoker     Smokeless tobacco: Never Used   Substance Use Topics     Alcohol use: Yes     Comment: 1-2 times a week couple drinks      Family History   Problem Relation Age of Onset " "    Cancer Mother 57        CLL, donig well, no txt     Gastrointestinal Disease Mother         IBS     Gastrointestinal Disease Sister         IBS     Depression Sister      Cancer Maternal Grandmother 65        CML, had txt,  at 90, unsure cause of death     Coronary Artery Disease Maternal Grandfather         later in life     Colon Cancer Paternal Grandmother         60s     Coronary Artery Disease Paternal Grandfather         later in life     Colon Cancer Paternal Grandfather         60s           Reviewed and updated as needed this visit by clinical staff    ROS:  Constitutional, HEENT, cardiovascular, pulmonary, gi and gu systems are negative, except as otherwise noted.    OBJECTIVE:     /69   Pulse 65   Temp 98.3  F (36.8  C) (Oral)   Resp 16   Ht 1.53 m (5' 0.25\")   Wt 54.4 kg (120 lb)   SpO2 98%   BMI 23.24 kg/m    Body mass index is 23.24 kg/m .  GENERAL: healthy, alert and no distress  RESP: lungs clear to auscultation - no rales, rhonchi or wheezes  CV: regular rate and rhythm, normal S1 S2, no S3 or S4, no murmur, click or rub, no peripheral edema and peripheral pulses strong  ABDOMEN: soft, nontender, no hepatosplenomegaly, no masses and bowel sounds normal  RECTAL (female): normal sphincter tone, no rectal masses    Diagnostic Test Results:  No results found for this or any previous visit (from the past 24 hour(s)).    ASSESSMENT/PLAN:       ICD-10-CM    1. Rectal bleeding K62.5 Occult blood stool     CANCELED: Occult blood stool     CANCELED: Occult blood stool   Exact cause of the rectal bleeding is not clear at this time.  Differential diagnosis includes anal fissure, internal hemorrhoids,  Crohn's disease, ulcerative colitis and IBS.    Patient was advised to monitor symptoms and return to clinic if bleeding persists.  Due to the quantity of the bleeding there is no indication to check for hemoglobin.    FOBT done today results are negative.    Richard Thompson MD  [facility]

## 2019-02-25 ENCOUNTER — MYC MEDICAL ADVICE (OUTPATIENT)
Dept: FAMILY MEDICINE | Facility: CLINIC | Age: 29
End: 2019-02-25

## 2019-03-05 ENCOUNTER — OFFICE VISIT (OUTPATIENT)
Dept: FAMILY MEDICINE | Facility: CLINIC | Age: 29
End: 2019-03-05
Payer: COMMERCIAL

## 2019-03-05 VITALS
HEART RATE: 75 BPM | TEMPERATURE: 99.1 F | WEIGHT: 118.5 LBS | BODY MASS INDEX: 23.26 KG/M2 | HEIGHT: 60 IN | DIASTOLIC BLOOD PRESSURE: 69 MMHG | RESPIRATION RATE: 16 BRPM | OXYGEN SATURATION: 100 % | SYSTOLIC BLOOD PRESSURE: 112 MMHG

## 2019-03-05 DIAGNOSIS — K58.8 OTHER IRRITABLE BOWEL SYNDROME: ICD-10-CM

## 2019-03-05 DIAGNOSIS — K13.0 CHEILITIS: ICD-10-CM

## 2019-03-05 DIAGNOSIS — R87.810 ASCUS WITH POSITIVE HIGH RISK HPV CERVICAL: ICD-10-CM

## 2019-03-05 DIAGNOSIS — Z11.3 SCREEN FOR STD (SEXUALLY TRANSMITTED DISEASE): ICD-10-CM

## 2019-03-05 DIAGNOSIS — R87.610 ASCUS WITH POSITIVE HIGH RISK HPV CERVICAL: ICD-10-CM

## 2019-03-05 DIAGNOSIS — Z00.00 ENCOUNTER FOR ROUTINE ADULT HEALTH EXAMINATION WITHOUT ABNORMAL FINDINGS: Primary | ICD-10-CM

## 2019-03-05 DIAGNOSIS — L70.0 ACNE VULGARIS: ICD-10-CM

## 2019-03-05 LAB — HIV 1+2 AB+HIV1 P24 AG SERPL QL IA: NONREACTIVE

## 2019-03-05 PROCEDURE — 99395 PREV VISIT EST AGE 18-39: CPT | Mod: 25 | Performed by: FAMILY MEDICINE

## 2019-03-05 PROCEDURE — 87491 CHLMYD TRACH DNA AMP PROBE: CPT | Performed by: FAMILY MEDICINE

## 2019-03-05 PROCEDURE — 87591 N.GONORRHOEAE DNA AMP PROB: CPT | Performed by: FAMILY MEDICINE

## 2019-03-05 PROCEDURE — 36415 COLL VENOUS BLD VENIPUNCTURE: CPT | Performed by: FAMILY MEDICINE

## 2019-03-05 PROCEDURE — 90715 TDAP VACCINE 7 YRS/> IM: CPT | Performed by: FAMILY MEDICINE

## 2019-03-05 PROCEDURE — 99213 OFFICE O/P EST LOW 20 MIN: CPT | Mod: 25 | Performed by: FAMILY MEDICINE

## 2019-03-05 PROCEDURE — 90471 IMMUNIZATION ADMIN: CPT | Performed by: FAMILY MEDICINE

## 2019-03-05 PROCEDURE — 88175 CYTOPATH C/V AUTO FLUID REDO: CPT | Performed by: FAMILY MEDICINE

## 2019-03-05 PROCEDURE — 87624 HPV HI-RISK TYP POOLED RSLT: CPT | Performed by: FAMILY MEDICINE

## 2019-03-05 PROCEDURE — 87389 HIV-1 AG W/HIV-1&-2 AB AG IA: CPT | Performed by: FAMILY MEDICINE

## 2019-03-05 PROCEDURE — 0064U ANTB TP TOTAL&RPR IA QUAL: CPT | Performed by: FAMILY MEDICINE

## 2019-03-05 ASSESSMENT — MIFFLIN-ST. JEOR: SCORE: 1197.98

## 2019-03-05 NOTE — PROGRESS NOTES
SUBJECTIVE:   CC: Bess Ellis is an 29 year old woman who presents for preventive health visit.     Physical   Annual:     Getting at least 3 servings of Calcium per day:  Yes    Bi-annual eye exam:  Yes    Dental care twice a year:  Yes    Sleep apnea or symptoms of sleep apnea:  None    Diet:  Regular (no restrictions)    Frequency of exercise:  4-5 days/week    Duration of exercise:  45-60 minutes    Taking medications regularly:  Yes    Medication side effects:  Not applicable    Additional concerns today:  Yes    PHQ-2 Total Score: 0    Due for pap/HPV cotest again this year.      Due for Tdap- last done in 8/09, due soon.    Lip issues - Wants to check if it's herpes or not...  This winter, lips really dry in general.    Cut-like sx's in side of mouth, and lower lip redness extended down, dry.  No bumps/vessicles or painful areas.  No grouped lesions.    Mirena IUD placed in '16.  No vaginal infx issues any longer like she did at first after insertion.      Today's PHQ-2 Score:   PHQ-2 ( 1999 Pf[employer]) 3/4/2019   Q1: Little interest or pleasure in doing things 0   Q2: Feeling down, depressed or hopeless 0   PHQ-2 Score 0   Q1: Little interest or pleasure in doing things Not at all   Q2: Feeling down, depressed or hopeless Not at all   PHQ-2 Score 0       Abuse: Current or Past(Physical, Sexual or Emotional)- No  Do you feel safe in your environment? Yes    Social History     Tobacco Use     Smoking status: Never Smoker     Smokeless tobacco: Never Used   Substance Use Topics     Alcohol use: Yes     Comment: 1-2 times a week couple drinks      Alcohol Use 3/4/2019   If you drink alcohol do you typically have greater than 3 drinks per day OR greater than 7 drinks per week? No   No flowsheet data found.    Reviewed orders with patient.  Reviewed health maintenance and updated orders accordingly - Yes  Labs reviewed in EPIC    Mammogram not appropriate for this patient based on age.    Pertinent mammograms are  "reviewed under the imaging tab.  History of abnormal Pap smear: NO - age 21-29 PAP every 3 years recommended  PAP / HPV Latest Ref Rng & Units 2/28/2018   PAP - ASC-US(A)   HPV 16 DNA NEG:Negative Negative   HPV 18 DNA NEG:Negative Negative   OTHER HR HPV NEG:Negative Positive(A)     Reviewed and updated as needed this visit by clinical staff  Tobacco  Allergies  Meds         Reviewed and updated as needed this visit by Provider            Review of Systems  10 point ROS of systems including Constitutional, Eyes, Respiratory, Cardiovascular, Gastroenterology, Genitourinary, Integumentary, Muscularskeletal, Psychiatric were all negative except for pertinent positives noted in my HPI.       OBJECTIVE:   /69 (BP Location: Left arm, Cuff Size: Adult Regular)   Pulse 75   Temp 99.1  F (37.3  C) (Oral)   Resp 16   Ht 1.53 m (5' 0.25\")   Wt 53.8 kg (118 lb 8 oz)   SpO2 100%   BMI 22.95 kg/m    Physical Exam  GENERAL: healthy, alert and no distress  EYES: Eyes grossly normal to inspection, PERRL and conjunctivae and sclerae normal  HENT: ear canals and TM's normal, nose and mouth without ulcers or lesions  NECK: no adenopathy, no asymmetry, masses, or scars and thyroid normal to palpation  RESP: lungs clear to auscultation - no rales, rhonchi or wheezes  BREAST: normal without masses, tenderness or nipple discharge and no palpable axillary masses or adenopathy  CV: regular rate and rhythm, normal S1 S2, no S3 or S4, no murmur, click or rub, no peripheral edema and peripheral pulses strong  ABDOMEN: soft, nontender, no hepatosplenomegaly, no masses and bowel sounds normal   (female): normal female external genitalia, normal urethral meatus, vaginal mucosa pink, moist, well rugated, and normal cervix/adnexa/uterus without masses or discharge  MS: no gross musculoskeletal defects noted, no edema  SKIN: no suspicious lesions or rashes  NEURO: Normal strength and tone, mentation intact and speech normal  PSYCH: " mentation appears normal, affect normal/bright      ASSESSMENT/PLAN:       ICD-10-CM    1. Encounter for routine adult health examination without abnormal findings Z00.00 TDAP, IM (10 - 64 YRS) - Adacel   2. ASCUS with positive high risk HPV cervical R87.610 Pap imaged thin layer diagnostic with HPV (select HPV order below)    R87.810 HPV High Risk Types DNA Cervical   3. Cheilitis K13.0    4. Acne vulgaris L70.0    5. Other irritable bowel syndrome K58.8    6. Screen for STD (sexually transmitted disease) Z11.3 NEISSERIA GONORRHOEA PCR     CHLAMYDIA TRACHOMATIS PCR     HIV Antigen Antibody Combo     Treponema Abs w Reflex to RPR and Titer     CPE- Discussed diet, calcium and exercise.  Went over Self Breast Exam.  Thin prep pap was done- due for f/u.  Eyes and Teeth or UTD or recommended f/u.  Tdap immunizations needed today- Risks/benefits discussed, given today.  See orders below for tests ordered and screening needed.  STD screening done today.     Cheilitis- suspect eczematous cheilitis, possibly angular.  Not consistent with herpetic cold sores, and nothing to culture today, so discussed blood labs likely unhelpful.  If they recur, and they look more like grouped vesicles with burning/tingling, she can come in for an urgent visit to do a viral culture (but sx's do not sound like this).  Discussed first trying to keep the area protected with barrier ointment like eucerin or aquafor or vaseline.  Can try steroid cream on it, but would stop right away if it worsens or does not improve as it can sometimes make it worse.    IBS sx's- Reviewed pt's sx's and previous work-up today. Sx's of loose stools and gas.    GI w/u negative, including celiac lab panel.  Started citrucel regularly, which is helping some.  Food allergy testing negative at allergy.  Sx's improved, unsure if from probiotics or diet changes.  Fam h/o IBS.  Will RTC if symptoms persist or worsen.           COUNSELING:  Reviewed preventive health  "counseling, as reflected in patient instructions  Special attention given to:        Regular exercise       Healthy diet/nutrition       Contraception       Osteoporosis Prevention/Bone Health       Safe sex practices/STD prevention    BP Readings from Last 1 Encounters:   03/05/19 112/69     Estimated body mass index is 22.95 kg/m  as calculated from the following:    Height as of this encounter: 1.53 m (5' 0.25\").    Weight as of this encounter: 53.8 kg (118 lb 8 oz).           reports that  has never smoked. she has never used smokeless tobacco.      Counseling Resources:  ATP IV Guidelines  Pooled Cohorts Equation Calculator  Breast Cancer Risk Calculator  FRAX Risk Assessment  ICSI Preventive Guidelines  Dietary Guidelines for Americans, 2010  USDA's MyPlate  ASA Prophylaxis  Lung CA Screening    Tati Gruber MD  [facility]  "

## 2019-03-05 NOTE — NURSING NOTE
"Chief Complaint   Patient presents with     Physical     initial /69 (BP Location: Left arm, Cuff Size: Adult Regular)   Pulse 75   Temp 99.1  F (37.3  C) (Oral)   Resp 16   Ht 1.53 m (5' 0.25\")   Wt 53.8 kg (118 lb 8 oz)   SpO2 100%   BMI 22.95 kg/m   Estimated body mass index is 22.95 kg/m  as calculated from the following:    Height as of this encounter: 1.53 m (5' 0.25\").    Weight as of this encounter: 53.8 kg (118 lb 8 oz).  BP completed using cuff size: regular.  L   arm      Health Maintenance that is potentially due pending provider review:  Pap Smear    PAP--Possibly completing today, per Provider review    Pravin Allen ma  "

## 2019-03-06 LAB
C TRACH DNA SPEC QL NAA+PROBE: NEGATIVE
N GONORRHOEA DNA SPEC QL NAA+PROBE: NEGATIVE
SPECIMEN SOURCE: NORMAL
SPECIMEN SOURCE: NORMAL
T PALLIDUM AB SER QL: NONREACTIVE

## 2019-03-07 LAB
COPATH REPORT: NORMAL
PAP: NORMAL

## 2019-03-08 LAB
FINAL DIAGNOSIS: ABNORMAL
HPV HR 12 DNA CVX QL NAA+PROBE: POSITIVE
HPV16 DNA SPEC QL NAA+PROBE: NEGATIVE
HPV18 DNA SPEC QL NAA+PROBE: NEGATIVE
SPECIMEN DESCRIPTION: ABNORMAL
SPECIMEN SOURCE CVX/VAG CYTO: ABNORMAL

## 2019-03-11 PROBLEM — Z97.5 IUD CONTRACEPTION: Chronic | Status: ACTIVE | Noted: 2018-02-28

## 2019-03-11 NOTE — RESULT ENCOUNTER NOTE
Here are your lab results from your recent visit...  -Your STD labs all came back negative (gonorrhea, chlamydia, HIV and syphilis).    Please let me know if you have any questions.  Capo,   Nael Gruber MD

## 2019-03-22 ENCOUNTER — TELEPHONE (OUTPATIENT)
Dept: FAMILY MEDICINE | Facility: CLINIC | Age: 29
End: 2019-03-22

## 2019-03-22 NOTE — TELEPHONE ENCOUNTER
Reason for Call: Appointment    Detailed comments: Please call patient back to schedule a Colposcopy    Phone Number Patient can be reached at: Home number on file [phone] (home)    Best Time: anytime    Can we leave a detailed message on this number? YES    Call taken on 3/22/2019 at 9:16 AM by Oliverio Ornelas

## 2019-03-22 NOTE — TELEPHONE ENCOUNTER
Called patient and she is scheduled for her Colposcopy 4/16/19  [facility] Coordinator

## 2019-04-28 ENCOUNTER — MYC MEDICAL ADVICE (OUTPATIENT)
Dept: FAMILY MEDICINE | Facility: CLINIC | Age: 29
End: 2019-04-28

## 2019-04-29 NOTE — TELEPHONE ENCOUNTER
CW,  Please see below.  Ok for STI and wet prep at col apt?  Please advise.  Thanks,  Miracle Beal RN

## 2019-04-30 NOTE — TELEPHONE ENCOUNTER
Sounds good- would call her and let her know (leave her a msg if needed) we are good to do the colposcopy tomorrow morning so it is clear.  Can definitely do the STD screen, and can talk about yeast testing - usually do not do that without sx's.  Thanks!  CW

## 2019-04-30 NOTE — TELEPHONE ENCOUNTER
Called patient no answer left message for patient informing her we could not do STI and colp together per provider we would need to treat vaginal infection first and colp would have to be reschedule .   Christianne Harrell MA

## 2019-04-30 NOTE — TELEPHONE ENCOUNTER
Pt called and was informed of this message. She stated that it was okay. The pt says she doesn't have any concerns or symptoms right now, but was just wondering.

## 2019-04-30 NOTE — TELEPHONE ENCOUNTER
Dr. Gruber,     Patient called back stating she has no symptoms and would like to proceed with colp.     Christianne Harrell MA

## 2019-05-01 ENCOUNTER — OFFICE VISIT (OUTPATIENT)
Dept: FAMILY MEDICINE | Facility: CLINIC | Age: 29
End: 2019-05-01
Payer: COMMERCIAL

## 2019-05-01 VITALS
DIASTOLIC BLOOD PRESSURE: 70 MMHG | HEIGHT: 60 IN | TEMPERATURE: 97.5 F | SYSTOLIC BLOOD PRESSURE: 105 MMHG | WEIGHT: 118 LBS | HEART RATE: 64 BPM | OXYGEN SATURATION: 100 % | BODY MASS INDEX: 23.16 KG/M2

## 2019-05-01 DIAGNOSIS — R87.610 ASCUS WITH POSITIVE HIGH RISK HPV CERVICAL: ICD-10-CM

## 2019-05-01 DIAGNOSIS — Z98.890 HISTORY OF COLPOSCOPY: Primary | ICD-10-CM

## 2019-05-01 DIAGNOSIS — Z11.3 SCREEN FOR STD (SEXUALLY TRANSMITTED DISEASE): ICD-10-CM

## 2019-05-01 DIAGNOSIS — R87.810 ASCUS WITH POSITIVE HIGH RISK HPV CERVICAL: ICD-10-CM

## 2019-05-01 LAB — HCG UR QL: NEGATIVE

## 2019-05-01 PROCEDURE — 81025 URINE PREGNANCY TEST: CPT | Performed by: FAMILY MEDICINE

## 2019-05-01 PROCEDURE — 57454 BX/CURETT OF CERVIX W/SCOPE: CPT | Performed by: FAMILY MEDICINE

## 2019-05-01 PROCEDURE — 87591 N.GONORRHOEAE DNA AMP PROB: CPT | Performed by: FAMILY MEDICINE

## 2019-05-01 PROCEDURE — 88305 TISSUE EXAM BY PATHOLOGIST: CPT | Performed by: FAMILY MEDICINE

## 2019-05-01 PROCEDURE — 87491 CHLMYD TRACH DNA AMP PROBE: CPT | Performed by: FAMILY MEDICINE

## 2019-05-01 ASSESSMENT — MIFFLIN-ST. JEOR: SCORE: 1195.71

## 2019-05-01 NOTE — TELEPHONE ENCOUNTER
left message for pt. to call back said we can do testing tomorrow at her procedure.  Pravin Allen ma

## 2019-05-01 NOTE — NURSING NOTE
"Chief Complaint   Patient presents with     Colposcopy     /70   Pulse 64   Temp 97.5  F (36.4  C) (Oral)   Ht 1.53 m (5' 0.25\")   Wt 53.5 kg (118 lb)   SpO2 100%   BMI 22.85 kg/m   Estimated body mass index is 22.85 kg/m  as calculated from the following:    Height as of this encounter: 1.53 m (5' 0.25\").    Weight as of this encounter: 53.5 kg (118 lb).        Health Maintenance due pending provider review:  NONE    n/a    Eula Cook CMA  "

## 2019-05-01 NOTE — PROGRESS NOTES
Bess Ellis is a 29 year old female who presents for repeat colposcopy, referred by myself. Pap smear 2 months ago showed: normal pap with HR HPV (not 16/18). The prior pap showed ASC-US with HR HPV (not 16/18), with f/u colposcopy with one bx with mild dysplasia.    Patient Active Problem List    Diagnosis Date Noted     Acne vulgaris 2019     Priority: Medium     [facility] - retinoin 0.05% gel, started ~, weaning up on dose.         ASCUS with positive high risk HPV cervical 2018     Priority: Medium     18: Ascus pap, with + HR HPV (not 16 or 18) result. Plan [facility].   18 colp- bx with mild dysplasia/JOSE I.  Rec f/u pap/HPV cotest in a year at physical.  19: NIL pap, + HR HPV (not 16 or 18) result. Plan [facility].        Other irritable bowel syndrome 2018     Priority: Medium     Loose stools and gas.    GI w/u negative, including celiac lab panel.    Started citrucel regularly, which is helping some.  Food allergy testing negative at allergy.  Sx's improved, unsure if from probiotics or diet changes.  Fam h/o IBS.       IUD contraception 2018     Priority: Medium     Mirena, placed 10/16 at [facility].  Initially had yeast infx issues x 6 months, but cleared and fine now.             Family History   Problem Relation Age of Onset     Cancer Mother 57        CLL, donig well, no txt     Gastrointestinal Disease Mother         IBS     Gastrointestinal Disease Sister         IBS     Depression Sister      Cancer Maternal Grandmother 65        CML, had txt,  at 90, unsure cause of death     Coronary Artery Disease Maternal Grandfather         later in life     Colon Cancer Paternal Grandmother         60s     Coronary Artery Disease Paternal Grandfather         later in life     Colon Cancer Paternal Grandfather         60s       Previous history of abnormal paps?: Yes - as above  History of cryotherapy (freezing)?: : No  History of veneral  diseases: : Yes - chlamydia '11, treated without issues  Do you desire testing for any of these diseases? : Yes - will check gc/chlam  History of genital warts:  No  Visible warts now?:  No  Previously treated? If so, how?:  No     No LMP recorded. (Menstrual status: IUD).  Type of contraception: IUD Mirena  Age at first sexual intercourse: 21  Number of sexual partners (lifetime): 16  History   Smoking Status     Never Smoker   Smokeless Tobacco     Never Used     History of sexual abuse:  No  Allergies as of 05/01/2019     (No Known Allergies)        PROCEDURE:  Before the procedure, it was ensured that the patient was educated regarding the nature of her findings to date, the implications of them, and what was to be done. She has been made aware of the role of HPV, the natural history of infection, ways to minimize her future risk, the effect of HPV on the cervix, and treatment options available should they be indicated. The   pathophysiology of the cervix, including a discussion of squamous vs. endometrial cells, and squamous metaplasia have all been reviewed, using illustrations and sketches. The details of the colposcopic procedure were reviewed, as well as the risks of missed diagnoses, pain, infection and bleeding. All questions were answered before proceeding, and informed consent was therefore obtained.    Bimanual examination: was not done  Unenhanced examination of the cervix was normal, able to see black IUD strings at os  Please refer to images section for details!  Pap repeated?:  No  SCJ seen?:  yes  Endocervical speculum (just q-tips) needed?:  Yes   ECC done?:  Yes   Lugol's solution used?:  Yes   Satisfactory examination?:  yes    Vaginal vault: normal to cursory inspection   Urethra normal?:  yes  Labia normal?:  yes  Perineum normal?:  yes  Rectum normal?:  yes    FINDINGS:  Please see image   Cervix: acetowhite area(s) at 10:00 and 12-1:00  Procedure: biopsies taken (not including ECC):  2.    Procedure summary: Patient tolerated procedure well     Assessment: HPV related changes    Plan: Specimens labelled and sent to pathology. Will base further treatment on pathology findings.  Treatment options discussed with patient.  Will call patient with results (okay to leave message if unable to reach her).  She will call if no word in ~2 weeks.

## 2019-05-02 LAB
C TRACH DNA SPEC QL NAA+PROBE: NEGATIVE
N GONORRHOEA DNA SPEC QL NAA+PROBE: NEGATIVE
SPECIMEN SOURCE: NORMAL
SPECIMEN SOURCE: NORMAL

## 2019-05-06 LAB — COPATH REPORT: NORMAL

## 2019-05-06 NOTE — RESULT ENCOUNTER NOTE
Here are your lab results from your recent visit...  -Your gonorrhea and chlamydia tests were negative.  -Your pregnancy test was negative as we discussed.    Please let me know if you have any questions, and I'll be in touch with the colposcopy biopsy results when they are back.  Best,   Nael Gruber MD

## 2019-05-13 ENCOUNTER — TELEPHONE (OUTPATIENT)
Dept: FAMILY MEDICINE | Facility: CLINIC | Age: 29
End: 2019-05-13

## 2019-05-13 DIAGNOSIS — R87.810 ASCUS WITH POSITIVE HIGH RISK HPV CERVICAL: ICD-10-CM

## 2019-05-13 DIAGNOSIS — R87.610 ASCUS WITH POSITIVE HIGH RISK HPV CERVICAL: ICD-10-CM

## 2019-05-13 NOTE — TELEPHONE ENCOUNTER
Aryan Gruber,   Just inquiring if the pt was notified of her [place] results and what the follow up plan is? Please advise.   Thanks,  Jasmina Tristan RN-Pap Tracking     Collected: 5/1/2019   Received: 5/2/2019   Reported: 5/6/2019 14:17   Ordering Phy(s): SYLVESTER GRUBER     For improved result formatting, select 'View Enhanced Report Format' under    Linked Documents section.     SPECIMEN(S):   A: Cervical biopsy, 10 o'clock   B: Cervical biopsy, 1 o'clock   C: Endocervical curettings     FINAL DIAGNOSIS:   A.  Cervix at 10:00, biopsies:   - No diagnostic abnormalities.   - Ectocervix with normal mature squamous epithelium.   - No endocervical glandular epithelium identified.     B.  Cervix at 1:00, biopsy:   Cervix from transformation zone with:   - Focal squamous atypia of undetermined significance.   - Mature squamous epithelium.   - Normal endocervical glandular epithelium.     C.  Endocervix, curettings:   - A small amount of atypical squamous epithelium, favor LSIL.   - Several minute fragments of normal mature squamous epithelium.   - Multiple fragments of normal endocervical tissue.     COMMENT:   Intradepartmental consultation is obtained on specimens B and C and   concurs wit the diagnosis.     Electronically signed out by:     Mariposa Forte M.D.
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
## 2019-05-15 NOTE — RESULT ENCOUNTER NOTE
Called pt and discussed results.  Bx's with mild changes, but ECC with ASCUS favor LSIL, similar to the JOSE I found on 3/18 bx's.  Rec f/u pap/HPV cotest in a year at her physical.  Pt agree with plan.  Will update problem list notes and send on to pap team.  CW

## 2019-06-03 ENCOUNTER — OFFICE VISIT (OUTPATIENT)
Dept: OPHTHALMOLOGY | Facility: CLINIC | Age: 29
End: 2019-06-03
Payer: COMMERCIAL

## 2019-06-03 DIAGNOSIS — H04.123 DRY EYE SYNDROME OF BOTH EYES: Primary | ICD-10-CM

## 2019-06-03 PROCEDURE — G0463 HOSPITAL OUTPT CLINIC VISIT: HCPCS | Mod: ZF

## 2019-06-03 RX ORDER — AMOXICILLIN 500 MG
1200 CAPSULE ORAL DAILY
COMMUNITY

## 2019-06-03 ASSESSMENT — TONOMETRY
OS_IOP_MMHG: 15
IOP_METHOD: TONOPEN
OD_IOP_MMHG: 18

## 2019-06-03 ASSESSMENT — VISUAL ACUITY
OS_SC: 20/20
METHOD: SNELLEN - LINEAR
OD_SC: 20/20
OD_SC+: -1
METHOD_MR: DEFERS

## 2019-06-03 ASSESSMENT — CONF VISUAL FIELD
OD_NORMAL: 1
OS_NORMAL: 1

## 2019-06-03 ASSESSMENT — SLIT LAMP EXAM - LIDS
COMMENTS: NORMAL
COMMENTS: NORMAL
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
## 2019-06-03 NOTE — NURSING NOTE
"Chief Complaints and History of Present Illnesses   Patient presents with     Dry Eye Syndrome Evaluation     Chief Complaint(s) and History of Present Illness(es)     Dry Eye Syndrome Evaluation     Laterality: both eyes    Characteristics: chronic    Associated symptoms: Negative for discharge    Context: dry eyes    Treatments tried: artificial tears    Pain scale: 0/10              Comments     Pt had LASIK in 2013 in both eyes  Pt reports eyes have always been dry, but the left one feels \"sticky\" dry lately - did start fish oil last week  Pt uses Systane drops PRN - feels some relief but not complete (doesn't use daily but sometimes uses a couple times a day)  Pt had a chalazion in the left upper lid for over a month last fall - now notes a pressure sensation around the left eye which creates a headache sensation    Penny Mckeon COA 8:42 AM Estefani 3, 2019                 "

## 2019-06-04 ENCOUNTER — OFFICE VISIT (OUTPATIENT)
Dept: FAMILY MEDICINE | Facility: CLINIC | Age: 29
End: 2019-06-04
Payer: COMMERCIAL

## 2019-06-04 VITALS
HEART RATE: 68 BPM | BODY MASS INDEX: 20.91 KG/M2 | SYSTOLIC BLOOD PRESSURE: 126 MMHG | DIASTOLIC BLOOD PRESSURE: 83 MMHG | HEIGHT: 63 IN | WEIGHT: 118 LBS | RESPIRATION RATE: 16 BRPM | OXYGEN SATURATION: 100 % | TEMPERATURE: 98.2 F

## 2019-06-04 DIAGNOSIS — R68.89 LIP SYMPTOM: Primary | ICD-10-CM

## 2019-06-04 DIAGNOSIS — N89.8 VAGINAL ITCHING: ICD-10-CM

## 2019-06-04 LAB
SPECIMEN SOURCE: NORMAL
WET PREP SPEC: NORMAL

## 2019-06-04 PROCEDURE — 87210 SMEAR WET MOUNT SALINE/INK: CPT | Performed by: FAMILY MEDICINE

## 2019-06-04 PROCEDURE — 99214 OFFICE O/P EST MOD 30 MIN: CPT | Performed by: FAMILY MEDICINE

## 2019-06-04 ASSESSMENT — MIFFLIN-ST. JEOR: SCORE: 1231.43

## 2019-06-04 NOTE — PROGRESS NOTES
Subjective     Bess Ellis is a 29 year old female who presents to clinic today for the following health issues:    HPI   Vaginal Symptoms      Duration: on and off for a while now    Description  itching, burning and odor    Intensity:  mild    Accompanying signs and symptoms (fever/dysuria/abdominal or back pain): None    History  Sexually active: yes, multiple partners, contraception - IUD  Possibility of pregnancy: No  Recent antibiotic use: no     Precipitating or alleviating factors: None    Therapies tried and outcome: none and Boric acid        Outcome: Helped     She took boric acid x 2 (two nights apart) last week.  Did help her sx's- sx's were itching and burning, more externally last week.  Sometimes sx's inside, sometimes both inside and outside.  No current sx's.  No sx's since taking the boric acid.    Gets really anxious about health issues.  Since last visit, she's been worried about potential oral herpes.  Lips- irritated, sometimes little bumps.  Side of mouth/crack, sometimes hurts.  No nerve pain or clusters of blisters.  No known exposures to herpes.      Patient Active Problem List   Diagnosis     IUD contraception     Other irritable bowel syndrome     ASCUS with positive high risk HPV cervical     Acne vulgaris     Past Surgical History:   Procedure Laterality Date     BIOPSY OF SKIN LESION  2017    atypical nevus, benign     LASIK  2013       Social History     Tobacco Use     Smoking status: Never Smoker     Smokeless tobacco: Never Used   Substance Use Topics     Alcohol use: Yes     Comment: 1-2 times a week couple drinks      Family History   Problem Relation Age of Onset     Cancer Mother 57        CLL, donig well, no txt     Gastrointestinal Disease Mother         IBS     Gastrointestinal Disease Sister         IBS     Depression Sister      Cancer Maternal Grandmother 65        CML, had txt,  at 90, unsure cause of death     Coronary Artery Disease Maternal Grandfather      "    later in life     Colon Cancer Paternal Grandmother         60s     Coronary Artery Disease Paternal Grandfather         later in life     Colon Cancer Paternal Grandfather         60s     Glaucoma No family hx of      Macular Degeneration No family hx of          Current Outpatient Medications   Medication Sig Dispense Refill     Biotin 1 MG CAPS        CALCIUM-VITAMIN D PO        Cyanocobalamin (B-12 PO)        levonorgestrel (MIRENA, 52 MG,) 20 MCG/24HR IUD 1 each by Intrauterine route once Inserted 10/2016       Omega-3 Fatty Acids (FISH OIL) 1200 MG capsule Take 1,200 mg by mouth daily       Probiotic Product (PROBIOTIC DAILY PO)        triamcinolone (KENALOG) 0.1 % ointment Apply sparingly to affected area three times daily for 14 days. 30 g 0     No Known Allergies  No lab results found.   BP Readings from Last 3 Encounters:   06/04/19 126/83   05/01/19 105/70   03/05/19 112/69    Wt Readings from Last 3 Encounters:   06/04/19 53.5 kg (118 lb)   05/01/19 53.5 kg (118 lb)   03/05/19 53.8 kg (118 lb 8 oz)              Reviewed and updated as needed this visit by Provider  Tobacco  Allergies  Meds  Problems  Med Hx  Surg Hx  Fam Hx         Review of Systems   ROS COMP: Constitutional, HEENT, cardiovascular, pulmonary, gi and gu systems are negative, except as otherwise noted.        Objective    /83   Pulse 68   Temp 98.2  F (36.8  C) (Oral)   Resp 16   Ht 1.588 m (5' 2.5\")   Wt 53.5 kg (118 lb)   SpO2 100%   BMI 21.24 kg/m    Body mass index is 21.24 kg/m .  Physical Exam   GENERAL APPEARANCE: healthy, alert and no distress     EYES: sclera clear, EOMI     RESP: lungs clear to auscultation - no rales, rhonchi or wheezes     CV: regular rates and rhythm, normal S1 S2, no S3 or S4 and no murmur, click or rub      Ext: warm, dry, no edema       SKIN: lips appear normal, no lesions or abnormal pigmentation, no cracks/fissues/blisters    Diagnostic Test Results:  Labs reviewed in " Epic  Results for orders placed or performed in visit on 06/04/19   Wet prep   Result Value Ref Range    Specimen Description Vagina     Wet Prep No Trichomonas seen     Wet Prep No clue cells seen     Wet Prep No yeast seen     Wet Prep No WBC's seen            Assessment & Plan       ICD-10-CM    1. Lip symptom K13.0 DERMATOLOGY REFERRAL   2. Vaginal itching N89.8 Wet prep     Vaginal sx's- resolved since using boric acid for external vaginal sx's last week.  Wet prep negative today.  Will avoid txt now.  RTC if symptoms persist or worsen.     Lip sx's- unable to see any lesions today, but discussed it sounds most like dermatitis or angular chelitis than herpes from her description. Discussed testing for herpes, and when it can be helpful, and when it is not.  Likely would not  at this time, so she is fine not testing for it today.  She's already been doing great with gentle lubrication to lips.  Could try anti-fungal if she gets more of the angular chelitis sx's.  Also referred her on to derm due to persistent lip sx's despite good conservative cares.    RTC if symptoms persist or worsen.     Tati Gruber MD  [facility]

## 2019-08-24 ENCOUNTER — OFFICE VISIT (OUTPATIENT)
Dept: URGENT CARE | Facility: URGENT CARE | Age: 29
End: 2019-08-24
Payer: COMMERCIAL

## 2019-08-24 VITALS
DIASTOLIC BLOOD PRESSURE: 68 MMHG | WEIGHT: 118.5 LBS | SYSTOLIC BLOOD PRESSURE: 102 MMHG | TEMPERATURE: 97.3 F | HEART RATE: 86 BPM | BODY MASS INDEX: 21.33 KG/M2

## 2019-08-24 DIAGNOSIS — J06.9 VIRAL URI WITH COUGH: Primary | ICD-10-CM

## 2019-08-24 LAB
DEPRECATED S PYO AG THROAT QL EIA: NORMAL
SPECIMEN SOURCE: NORMAL

## 2019-08-24 PROCEDURE — 87880 STREP A ASSAY W/OPTIC: CPT | Performed by: FAMILY MEDICINE

## 2019-08-24 PROCEDURE — 87081 CULTURE SCREEN ONLY: CPT | Performed by: FAMILY MEDICINE

## 2019-08-24 PROCEDURE — 99213 OFFICE O/P EST LOW 20 MIN: CPT | Performed by: FAMILY MEDICINE

## 2019-08-24 NOTE — PROGRESS NOTES
Subjective: 4 days ago she got a sore throat and fatigue and a little dizziness and nausea, and since then the sore throat continues, milder, fatigue is pretty powerful, slight dry cough.  No one around her is sick but she works at the [employer]  She has never had mono    Objective: ENT is really pretty normal.  No anterior nodes.  The lungs are clear.  Heart is regular without murmurs.  Abdomen benign.  Strep test negative.    Assessment and plan: Viral URI, symptomatic care.  If it continues for more than a week consider mono testing.

## 2019-08-25 LAB
BACTERIA SPEC CULT: NORMAL
SPECIMEN SOURCE: NORMAL

## 2020-01-22 ENCOUNTER — OFFICE VISIT (OUTPATIENT)
Dept: FAMILY MEDICINE | Facility: CLINIC | Age: 30
End: 2020-01-22
Payer: COMMERCIAL

## 2020-01-22 VITALS
DIASTOLIC BLOOD PRESSURE: 76 MMHG | HEART RATE: 54 BPM | WEIGHT: 116 LBS | OXYGEN SATURATION: 98 % | HEIGHT: 63 IN | TEMPERATURE: 97 F | SYSTOLIC BLOOD PRESSURE: 107 MMHG | BODY MASS INDEX: 20.55 KG/M2

## 2020-01-22 DIAGNOSIS — G47.00 INSOMNIA, UNSPECIFIED TYPE: ICD-10-CM

## 2020-01-22 DIAGNOSIS — R30.0 DYSURIA: Primary | ICD-10-CM

## 2020-01-22 DIAGNOSIS — Z32.00 PREGNANCY EXAMINATION OR TEST, PREGNANCY UNCONFIRMED: ICD-10-CM

## 2020-01-22 DIAGNOSIS — F41.9 ANXIETY: ICD-10-CM

## 2020-01-22 DIAGNOSIS — K58.8 OTHER IRRITABLE BOWEL SYNDROME: ICD-10-CM

## 2020-01-22 LAB
ALBUMIN UR-MCNC: NEGATIVE MG/DL
APPEARANCE UR: CLEAR
BACTERIA #/AREA URNS HPF: ABNORMAL /HPF
BILIRUB UR QL STRIP: NEGATIVE
COLOR UR AUTO: YELLOW
GLUCOSE UR STRIP-MCNC: NEGATIVE MG/DL
HCG UR QL: NEGATIVE
HGB UR QL STRIP: NEGATIVE
KETONES UR STRIP-MCNC: NEGATIVE MG/DL
LEUKOCYTE ESTERASE UR QL STRIP: NEGATIVE
NITRATE UR QL: NEGATIVE
NON-SQ EPI CELLS #/AREA URNS LPF: ABNORMAL /LPF
PH UR STRIP: 7 PH (ref 5–7)
RBC #/AREA URNS AUTO: ABNORMAL /HPF
SOURCE: ABNORMAL
SP GR UR STRIP: 1.01 (ref 1–1.03)
UROBILINOGEN UR STRIP-ACNC: 0.2 EU/DL (ref 0.2–1)
WBC #/AREA URNS AUTO: ABNORMAL /HPF

## 2020-01-22 PROCEDURE — 81001 URINALYSIS AUTO W/SCOPE: CPT | Performed by: FAMILY MEDICINE

## 2020-01-22 PROCEDURE — 81025 URINE PREGNANCY TEST: CPT | Performed by: FAMILY MEDICINE

## 2020-01-22 PROCEDURE — 99214 OFFICE O/P EST MOD 30 MIN: CPT | Performed by: FAMILY MEDICINE

## 2020-01-22 RX ORDER — CLINDAMYCIN PHOSPHATE 10 MG/G
1 GEL TOPICAL PRN
COMMUNITY
Start: 2019-10-02 | End: 2024-04-02

## 2020-01-22 RX ORDER — HYDROXYZINE PAMOATE 25 MG/1
25 CAPSULE ORAL PRN
Qty: 30 CAPSULE | Refills: 1 | Status: SHIPPED | OUTPATIENT
Start: 2020-01-22 | End: 2021-03-19

## 2020-01-22 RX ORDER — HYDROCORTISONE 2.5 %
1 CREAM (GRAM) TOPICAL PRN
COMMUNITY
Start: 2019-06-10

## 2020-01-22 ASSESSMENT — ANXIETY QUESTIONNAIRES
6. BECOMING EASILY ANNOYED OR IRRITABLE: SEVERAL DAYS
7. FEELING AFRAID AS IF SOMETHING AWFUL MIGHT HAPPEN: NOT AT ALL
GAD7 TOTAL SCORE: 6
5. BEING SO RESTLESS THAT IT IS HARD TO SIT STILL: SEVERAL DAYS
1. FEELING NERVOUS, ANXIOUS, OR ON EDGE: SEVERAL DAYS
2. NOT BEING ABLE TO STOP OR CONTROL WORRYING: SEVERAL DAYS
3. WORRYING TOO MUCH ABOUT DIFFERENT THINGS: SEVERAL DAYS
IF YOU CHECKED OFF ANY PROBLEMS ON THIS QUESTIONNAIRE, HOW DIFFICULT HAVE THESE PROBLEMS MADE IT FOR YOU TO DO YOUR WORK, TAKE CARE OF THINGS AT HOME, OR GET ALONG WITH OTHER PEOPLE: SOMEWHAT DIFFICULT

## 2020-01-22 ASSESSMENT — MIFFLIN-ST. JEOR: SCORE: 1217.36

## 2020-01-22 ASSESSMENT — PATIENT HEALTH QUESTIONNAIRE - PHQ9
SUM OF ALL RESPONSES TO PHQ QUESTIONS 1-9: 2
5. POOR APPETITE OR OVEREATING: SEVERAL DAYS

## 2020-01-22 NOTE — PATIENT INSTRUCTIONS
For anxiety spikes, can try the vistaril 25mg capsules as needed.    For sleep...  Can also use the vistaril.  Other options...  --Magnesium 200-400mg at night (mild, though can give loose stools, otherwise minimal side effects).  --Melatonin 1-10mg at night (can adjust dosing, timing before bed to see what works best).  --Unisom (doxylamine tablets) 1/4-1 tab at bedtime.

## 2020-01-22 NOTE — PROGRESS NOTES
Subjective   Bess Ellis is a 30 year old female who presents to clinic today for the following health issues:    HPI   URINARY TRACT SYMPTOMS    Duration: 01/18/2020    Description  Abdominal pain    Intensity:  mild    Accompanying signs and symptoms:  Fever/chills: no   Flank pain YES- lower abdominal pain  Nausea and vomiting: YES- Friday 01/18/2020  Vaginal symptoms: none  Abdominal/Pelvic Pain: YES    History  History of frequent UTI's: no   History of kidney stones: no   Sexually Active: yes  Not in past couple of weeks  Possibility of pregnancy: Yes but patient has IUD    Precipitating or alleviating factors: None    Therapies tried and outcome: none   Outcome: none    Had pain in left pelvic area.  On/off, stabbing pain when it came, maybe ~4/10, didn't last long.  No fevers/chills.  Similar sx's have come in similar area when she's had a UTI in past.  No pain with urination, no frequency/urgency issues.  Always has back/flank pain, no change in these sx's lately.    Nausea sx's - recent and chronic GI sx's  Has had nausea, but feels like it could be related to food.  Eggs tend to give her diarrhea, baked foods are fine.  Vomited 2 wks ago (1am, couple times, felt mostly fine the next day other than mild GI upset), and ~5 days ago, with nausea the next day (Fri/Sat).  Stomach has been mostly fine from Sun to today.    Looking into lowering dairy.  Eats mostly vegetarian.  Thinking about going vegan.  No red meat, does have fish.  Did have salmon both nights prior to the recent sx's.      Other concerns brought up towards end of visit today...    --Anxiety-   She has managed anxiety sx's for yrs with good self-cares...  Sees therapist- ~1x/month, maintenance mode.  Still has times when anxiety sx's do spike up, though.  Wondering if there are txt options for those times.  Works full time, and in grad school full time- feels like the stressors of that can really set her off.      ---Sleep-  Normally sleeps  fine, but sometimes finds she just can't sleep.  Sometimes can't get to sleep, sometimes can't stay asleep.  Sometimes if she's had etoh, or in new environment, sometimes random.  Maybe ~2x/month.  Has melatonin at home, takes it at times- usually if can't get to sleep.  Unsure dose.      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      Patient Active Problem List   Diagnosis     IUD contraception     Other irritable bowel syndrome     ASCUS with positive high risk HPV cervical     Acne vulgaris     Past Surgical History:   Procedure Laterality Date     BIOPSY OF SKIN LESION  2017    atypical nevus, benign     LASIK  2013       Social History     Tobacco Use     Smoking status: Never Smoker     Smokeless tobacco: Never Used   Substance Use Topics     Alcohol use: Yes     Comment: 1-2 times a week couple drinks      Family History   Problem Relation Age of Onset     Cancer Mother 57        CLL, donig well, no txt     Gastrointestinal Disease Mother         IBS     Gastrointestinal Disease Sister         IBS     Depression Sister      Cancer Maternal Grandmother 65        CML, had txt,  at 90, unsure cause of death     Coronary Artery Disease Maternal Grandfather         later in life     Colon Cancer Paternal Grandmother         60s     Coronary Artery Disease Paternal Grandfather         later in life     Colon Cancer Paternal Grandfather         60s     Glaucoma No family hx of      Macular Degeneration No family hx of          Current Outpatient Medications   Medication Sig Dispense Refill     Biotin 1 MG CAPS        CALCIUM-VITAMIN D PO        Cyanocobalamin (B-12 PO)        hydrOXYzine (VISTARIL) 25 MG capsule Take 1 capsule (25 mg) by mouth as needed for itching or anxiety (max 50mg in 24 hrs) 30 capsule 1     levonorgestrel (MIRENA, 52 MG,) 20 MCG/24HR IUD 1 each by Intrauterine route once Inserted 10/2016       Omega-3 Fatty Acids (FISH OIL) 1200 MG capsule Take 1,200 mg by mouth  "daily       Probiotic Product (PROBIOTIC DAILY PO)        triamcinolone (KENALOG) 0.1 % ointment Apply sparingly to affected area three times daily for 14 days. 30 g 0     clindamycin (CLINDAMAX) 1 % external gel        hydrocortisone 2.5 % cream        No Known Allergies  No lab results found.   BP Readings from Last 3 Encounters:   01/22/20 107/76   08/24/19 102/68   06/04/19 126/83    Wt Readings from Last 3 Encounters:   01/22/20 52.6 kg (116 lb)   08/24/19 53.8 kg (118 lb 8 oz)   06/04/19 53.5 kg (118 lb)            Reviewed and updated as needed this visit by Provider  Tobacco  Allergies  Meds  Problems  Med Hx  Surg Hx  Fam Hx         Review of Systems   ROS COMP: Constitutional, HEENT, cardiovascular, pulmonary, gi and gu systems are negative, except as otherwise noted.      Objective    /76   Pulse 54   Temp 97  F (36.1  C) (Oral)   Ht 1.588 m (5' 2.5\")   Wt 52.6 kg (116 lb)   SpO2 98%   BMI 20.88 kg/m    Body mass index is 20.88 kg/m .  Physical Exam   GENERAL APPEARANCE: healthy, alert and no distress     EYES: PERRL, sclera clear     HENT: nose and mouth without ulcers or lesions     NECK: no adenopathy, no asymmetry, masses, or scars and thyroid normal to palpation     RESP: lungs clear to auscultation - no rales, rhonchi or wheezes     CV: regular rates and rhythm, normal S1 S2, no S3 or S4 and no murmur, click or rub      Abdomen: soft, nontender, no HSM or masses and bowel sounds normal     Ext: warm, dry, no edema     Psych: full range affect, normal speech and grooming, judgement and insight intact     Diagnostic Test Results:  Labs reviewed in Epic  Results for orders placed or performed in visit on 01/22/20   HCG Qual, Urine ([id number])     Status: None   Result Value Ref Range    HCG Qual Urine Negative NEG^Negative   UA with Microscopic reflex to Culture     Status: Abnormal   Result Value Ref Range    Color Urine Yellow     Appearance Urine Clear     Glucose Urine Negative " NEG^Negative mg/dL    Bilirubin Urine Negative NEG^Negative    Ketones Urine Negative NEG^Negative mg/dL    Specific Gravity Urine 1.010 1.003 - 1.035    pH Urine 7.0 5.0 - 7.0 pH    Protein Albumin Urine Negative NEG^Negative mg/dL    Urobilinogen Urine 0.2 0.2 - 1.0 EU/dL    Nitrite Urine Negative NEG^Negative    Blood Urine Negative NEG^Negative    Leukocyte Esterase Urine Negative NEG^Negative    Source Midstream Urine     WBC Urine 0 - 5 OTO5^0 - 5 /HPF    RBC Urine O - 2 OTO2^O - 2 /HPF    Squamous Epithelial /LPF Urine Few FEW^Few /LPF    Bacteria Urine Few (A) NEG^Negative /HPF           Assessment & Plan       ICD-10-CM    1. Dysuria R30.0 UA with Microscopic reflex to Culture   2. Pregnancy examination or test, pregnancy unconfirmed Z32.00 HCG Qual, Urine ([id number])   3. Anxiety F41.9 hydrOXYzine (VISTARIL) 25 MG capsule   4. Insomnia, unspecified type G47.00    5. Other irritable bowel syndrome K58.8      Dysuria- UA neg and sx's have already mostly resolved.  Will not do any txt, but rec good fluid hydration.    IBS- cont work on diet trials.    Pt brought up anxiety and insomnia at end of visit, so started discussion about it as below...  RTC for further discussion.       Patient Instructions   For anxiety spikes, can try the vistaril 25mg capsules as needed.    For sleep...  Can also use the vistaril.  Other options...  --Magnesium 200-400mg at night (mild, though can give loose stools, otherwise minimal side effects).  --Melatonin 1-10mg at night (can adjust dosing, timing before bed to see what works best).  --Unisom (doxylamine tablets) 1/4-1 tab at bedtime.          Return in about 2 months (around 3/22/2020) for Physical Exam, Anxiety follow-up, insomnia, pap.    Tait Gruber MD  [facility]

## 2020-01-22 NOTE — NURSING NOTE
"Chief Complaint   Patient presents with     UTI     /76   Pulse 54   Temp 97  F (36.1  C) (Oral)   Ht 1.588 m (5' 2.5\")   SpO2 98%   BMI 21.33 kg/m   Estimated body mass index is 21.33 kg/m  as calculated from the following:    Height as of this encounter: 1.588 m (5' 2.5\").    Weight as of 8/24/19: 53.8 kg (118 lb 8 oz).  bp completed using cuff size: regular      Health Maintenance addressed:  NONE    n/a    Patricia Chacko MA    "

## 2020-01-23 ASSESSMENT — ANXIETY QUESTIONNAIRES: GAD7 TOTAL SCORE: 6

## 2020-03-10 ENCOUNTER — HEALTH MAINTENANCE LETTER (OUTPATIENT)
Age: 30
End: 2020-03-10

## 2020-03-11 ENCOUNTER — OFFICE VISIT (OUTPATIENT)
Dept: FAMILY MEDICINE | Facility: CLINIC | Age: 30
End: 2020-03-11
Payer: COMMERCIAL

## 2020-03-11 VITALS
BODY MASS INDEX: 21.69 KG/M2 | OXYGEN SATURATION: 100 % | DIASTOLIC BLOOD PRESSURE: 78 MMHG | HEART RATE: 80 BPM | RESPIRATION RATE: 12 BRPM | TEMPERATURE: 98.2 F | SYSTOLIC BLOOD PRESSURE: 112 MMHG | HEIGHT: 61 IN | WEIGHT: 114.9 LBS

## 2020-03-11 DIAGNOSIS — R87.810 ASCUS WITH POSITIVE HIGH RISK HPV CERVICAL: ICD-10-CM

## 2020-03-11 DIAGNOSIS — L70.0 ACNE VULGARIS: ICD-10-CM

## 2020-03-11 DIAGNOSIS — Z97.5 IUD CONTRACEPTION: ICD-10-CM

## 2020-03-11 DIAGNOSIS — R87.610 ASCUS WITH POSITIVE HIGH RISK HPV CERVICAL: ICD-10-CM

## 2020-03-11 DIAGNOSIS — Z00.00 ROUTINE GENERAL MEDICAL EXAMINATION AT A HEALTH CARE FACILITY: Primary | ICD-10-CM

## 2020-03-11 DIAGNOSIS — K58.8 OTHER IRRITABLE BOWEL SYNDROME: ICD-10-CM

## 2020-03-11 LAB
HIV 1+2 AB+HIV1 P24 AG SERPL QL IA: NONREACTIVE
T PALLIDUM AB SER QL: NONREACTIVE

## 2020-03-11 PROCEDURE — 99395 PREV VISIT EST AGE 18-39: CPT | Performed by: FAMILY MEDICINE

## 2020-03-11 PROCEDURE — 87389 HIV-1 AG W/HIV-1&-2 AB AG IA: CPT | Performed by: FAMILY MEDICINE

## 2020-03-11 PROCEDURE — G0145 SCR C/V CYTO,THINLAYER,RESCR: HCPCS | Performed by: FAMILY MEDICINE

## 2020-03-11 PROCEDURE — 36415 COLL VENOUS BLD VENIPUNCTURE: CPT | Performed by: FAMILY MEDICINE

## 2020-03-11 PROCEDURE — 86780 TREPONEMA PALLIDUM: CPT | Performed by: FAMILY MEDICINE

## 2020-03-11 PROCEDURE — 87591 N.GONORRHOEAE DNA AMP PROB: CPT | Performed by: FAMILY MEDICINE

## 2020-03-11 PROCEDURE — 87624 HPV HI-RISK TYP POOLED RSLT: CPT | Performed by: FAMILY MEDICINE

## 2020-03-11 PROCEDURE — 87491 CHLMYD TRACH DNA AMP PROBE: CPT | Performed by: FAMILY MEDICINE

## 2020-03-11 ASSESSMENT — MIFFLIN-ST. JEOR: SCORE: 1180.62

## 2020-03-11 NOTE — PROGRESS NOTES
SUBJECTIVE:   CC: Bess Ellis is an 30 year old woman who presents for preventive health visit.     Healthy Habits:     Getting at least 3 servings of Calcium per day:  Yes    Bi-annual eye exam:  Yes    Dental care twice a year:  Yes    Sleep apnea or symptoms of sleep apnea:  Daytime drowsiness    Diet:  Low salt, Low fat/cholesterol and Vegetarian/vegan    Frequency of exercise:  4-5 days/week    Duration of exercise:  45-60 minutes    Taking medications regularly:  Yes    Medication side effects:  None    PHQ-2 Total Score: 1    Additional concerns today:  Yes    Insomnia- sometimes as trouble falling asleep, anxiety sx's.  She tried the antihistamine twice for anxiety/sleep.  First time, earlier on in the day, didn't feel to much, but did feel tired at bedtime.  Second time, took it later, unsure if it helped her sleep, and felt groggy in the am.  Took the 25mg capsule.  Sometimes takes melatonin, just doesn't seem to work as well as in the past.  Not as worried about her anxiety recently.    IBS sx's- better sx's.  Trying to eat mostly vegan more recently.  Minimal meat- 2-3 times in the past two months (tuna).  Minimal dairy.  Still gets loose stools fairly regularly, though thinks the meat/dairy restriction is helping some.  Usually 2 stools/day, though a bit more often lately, but less amount.  Taking citrucel, and align daily.    Acne- uses clindamycin on her face daily through dermatology.  Would be okay to take it over if stable- would bring it in to the next appt and we can discuss taking it over from this clinic next time.        Today's PHQ-2 Score:   PHQ-2 ( 1999 Pfizer) 3/8/2020   Q1: Little interest or pleasure in doing things 0   Q2: Feeling down, depressed or hopeless 1   PHQ-2 Score 1   Q1: Little interest or pleasure in doing things Not at all   Q2: Feeling down, depressed or hopeless Several days   PHQ-2 Score 1       Abuse: Current or Past(Physical, Sexual or Emotional)- No  Do you feel  safe in your environment? Yes        Social History     Tobacco Use     Smoking status: Never Smoker     Smokeless tobacco: Never Used   Substance Use Topics     Alcohol use: Yes     Comment: 1-2 times a week couple drinks          Alcohol Use 3/8/2020   Prescreen: >3 drinks/day or >7 drinks/week? No   Prescreen: >3 drinks/day or >7 drinks/week? -       Reviewed orders with patient.  Reviewed health maintenance and updated orders accordingly - Yes    Lab work is in process  Labs reviewed in EPIC  BP Readings from Last 3 Encounters:   20 112/78   20 107/76   19 102/68    Wt Readings from Last 3 Encounters:   20 52.1 kg (114 lb 14.4 oz)   20 52.6 kg (116 lb)   19 53.8 kg (118 lb 8 oz)                  Patient Active Problem List   Diagnosis     IUD contraception     Other irritable bowel syndrome     ASCUS with positive high risk HPV cervical     Acne vulgaris     Past Surgical History:   Procedure Laterality Date     BIOPSY OF SKIN LESION  2017    atypical nevus, benign     LASIK  2013       Social History     Tobacco Use     Smoking status: Never Smoker     Smokeless tobacco: Never Used   Substance Use Topics     Alcohol use: Yes     Comment: 1-2 times a week couple drinks      Family History   Problem Relation Age of Onset     Cancer Mother 57        CLL, donig well, no txt     Gastrointestinal Disease Mother         IBS     Gastrointestinal Disease Sister         IBS     Depression Sister      Cancer Maternal Grandmother 65        CML, had txt,  at 90, unsure cause of death     Coronary Artery Disease Maternal Grandfather         later in life     Colon Cancer Paternal Grandmother         60s     Coronary Artery Disease Paternal Grandfather         later in life     Colon Cancer Paternal Grandfather         60s     Glaucoma No family hx of      Macular Degeneration No family hx of          Current Outpatient Medications   Medication Sig Dispense Refill     Biotin 1 MG  "CAPS        CALCIUM-VITAMIN D PO Take 600 mg by mouth        clindamycin (CLINDAMAX) 1 % external gel        Cyanocobalamin (B-12 PO)        hydrocortisone 2.5 % cream        hydrOXYzine (VISTARIL) 25 MG capsule Take 1 capsule (25 mg) by mouth as needed for itching or anxiety (max 50mg in 24 hrs) 30 capsule 1     levonorgestrel (MIRENA, 52 MG,) 20 MCG/24HR IUD 1 each by Intrauterine route once Inserted 10/2016       Omega-3 Fatty Acids (FISH OIL) 1200 MG capsule Take 1,200 mg by mouth daily       Probiotic Product (PROBIOTIC DAILY PO)        triamcinolone (KENALOG) 0.1 % ointment Apply sparingly to affected area three times daily for 14 days. 30 g 0     No Known Allergies  No lab results found.     Mammogram not appropriate for this patient based on age.    Pertinent mammograms are reviewed under the imaging tab.  History of abnormal Pap smear: NO - age 21-29 PAP every 3 years recommended  PAP / HPV Latest Ref Rng & Units 3/5/2019 2/28/2018   PAP - NIL ASC-US(A)   HPV 16 DNA NEG:Negative Negative Negative   HPV 18 DNA NEG:Negative Negative Negative   OTHER HR HPV NEG:Negative Positive(A) Positive(A)     Reviewed and updated as needed this visit by clinical staff         Reviewed and updated as needed this visit by Provider            Review of Systems  10 point ROS of systems including Constitutional, Eyes, Respiratory, Cardiovascular, Gastroenterology, Genitourinary, Integumentary, Muscularskeletal, Psychiatric were all negative except for pertinent positives noted in my HPI.       OBJECTIVE:   /78   Pulse 80   Temp 98.2  F (36.8  C) (Oral)   Resp 12   Ht 1.537 m (5' 0.5\")   Wt 52.1 kg (114 lb 14.4 oz)   SpO2 100%   BMI 22.07 kg/m    Physical Exam  GENERAL: healthy, alert and no distress  EYES: Eyes grossly normal to inspection, PERRL and conjunctivae and sclerae normal  HENT: ear canals and TM's normal, nose and mouth without ulcers or lesions  NECK: no adenopathy, no asymmetry, masses, or scars and " thyroid normal to palpation  RESP: lungs clear to auscultation - no rales, rhonchi or wheezes  BREAST: normal without masses, tenderness or nipple discharge and no palpable axillary masses or adenopathy  CV: regular rate and rhythm, normal S1 S2, no S3 or S4, no murmur, click or rub, no peripheral edema and peripheral pulses strong  ABDOMEN: soft, nontender, no hepatosplenomegaly, no masses and bowel sounds normal   (female): normal female external genitalia, normal urethral meatus, vaginal mucosa pink, moist, well rugated, and normal cervix/adnexa/uterus without masses or discharge  MS: no gross musculoskeletal defects noted, no edema  SKIN: no suspicious lesions or rashes  NEURO: Normal strength and tone, mentation intact and speech normal  PSYCH: mentation appears normal, affect normal/bright    Diagnostic Test Results:  Labs reviewed in Epic  No results found for this or any previous visit (from the past 24 hour(s)).    ASSESSMENT/PLAN:       ICD-10-CM    1. Routine general medical examination at a health care facility  Z00.00 NEISSERIA GONORRHOEA PCR     CHLAMYDIA TRACHOMATIS PCR     HIV Antigen Antibody Combo     Treponema Abs w Reflex to RPR and Titer     Pap imaged thin layer screen with HPV - recommended age 30 - 65 years (select HPV order below)     HPV High Risk Types DNA Cervical   2. ASCUS with positive high risk HPV cervical  R87.610 Pap imaged thin layer screen with HPV - recommended age 30 - 65 years (select HPV order below)    R87.810 HPV High Risk Types DNA Cervical   3. IUD contraception  Z97.5    4. Other irritable bowel syndrome  K58.8    5. Acne vulgaris  L70.0      CPE- Discussed diet, calcium and exercise.  Thin prep pap was done.  Eye and dental care UTD or recommended f/u.  No immunizations needed today.  See orders below for tests ordered and screening needed.  STD screen today.    --Consider trial of prn 1/4-1 tab unisom rarely for sleep, along with sleep hygiene measures.  --Consider  "switch from citrucel to metamucil for IBS sx's.   Sx's seem better lately with less meat/dairy in diet per pt.   --Acne- f/u with derm for now, but consider taking over rx's here if stable and bring in rx/tube.        COUNSELING:  Reviewed preventive health counseling, as reflected in patient instructions    Estimated body mass index is 20.88 kg/m  as calculated from the following:    Height as of 1/22/20: 1.588 m (5' 2.5\").    Weight as of 1/22/20: 52.6 kg (116 lb).         reports that she has never smoked. She has never used smokeless tobacco.      Counseling Resources:  ATP IV Guidelines  Pooled Cohorts Equation Calculator  Breast Cancer Risk Calculator  FRAX Risk Assessment  ICSI Preventive Guidelines  Dietary Guidelines for Americans, 2010  USDA's MyPlate  ASA Prophylaxis  Lung CA Screening    Tati Gruber MD  [facility]  "

## 2020-03-11 NOTE — PATIENT INSTRUCTIONS
Insomnia- if melatonin isn't working.  Can try unisom tablets sparingly (from 1/4 tab to full tab) at bedtime.      Preventive Health Recommendations  Female Ages 26 - 39  Yearly exam:   See your health care provider every year in order to    Review health changes.     Discuss preventive care.      Review your medicines if you your doctor has prescribed any.    Until age 30: Get a Pap test every three years (more often if you have had an abnormal result).    After age 30: Talk to your doctor about whether you should have a Pap test every 3 years or have a Pap test with HPV screening every 5 years.   You do not need a Pap test if your uterus was removed (hysterectomy) and you have not had cancer.  You should be tested each year for STDs (sexually transmitted diseases), if you're at risk.   Talk to your provider about how often to have your cholesterol checked.  If you are at risk for diabetes, you should have a diabetes test (fasting glucose).  Shots: Get a flu shot each year. Get a tetanus shot every 10 years.   Nutrition:     Eat at least 5 servings of fruits and vegetables each day.    Eat whole-grain bread, whole-wheat pasta and brown rice instead of white grains and rice.    Get adequate Calcium and Vitamin D.     Lifestyle    Exercise at least 150 minutes a week (30 minutes a day, 5 days of the week). This will help you control your weight and prevent disease.    Limit alcohol to one drink per day.    No smoking.     Wear sunscreen to prevent skin cancer.    See your dentist every six months for an exam and cleaning.

## 2020-03-12 NOTE — RESULT ENCOUNTER NOTE
Here are your lab results from your recent visit...  -Your STD labs (gonorrhea, chlamydia, HIV and syphilis) are all negative.    Please let me know if you have any questions.  Nael Scanlon MD

## 2020-03-16 LAB
COPATH REPORT: NORMAL
PAP: NORMAL

## 2020-03-18 ENCOUNTER — MYC MEDICAL ADVICE (OUTPATIENT)
Dept: FAMILY MEDICINE | Facility: CLINIC | Age: 30
End: 2020-03-18

## 2020-04-21 ENCOUNTER — MYC MEDICAL ADVICE (OUTPATIENT)
Dept: FAMILY MEDICINE | Facility: CLINIC | Age: 30
End: 2020-04-21

## 2020-04-21 NOTE — TELEPHONE ENCOUNTER
CW,  Please see below MyChart message and advise on urgency of colp  Pap nurses advised within 3 months (pap 3/11/2020)  Thanks,  Samara SILVERIO RN

## 2020-04-22 NOTE — TELEPHONE ENCOUNTER
Updated in problem list and Pap Tracking episode. I sent a mychart response to the pt.   Jasmina Tristan RN-Pap Tracking

## 2020-04-22 NOTE — TELEPHONE ENCOUNTER
Hi Dr. Gruber,  Since she has had a previous Shanks we would need you to review and make recommendations.   Please review and recommend appropriate follow up time frame, due to recent concerns with exposure to COVID 19. Interim guidelines, [email or web address]. Would you recommend Shanks be completed within the next 3-6 months or 6-12 months from date of last pap, which was done on 03/11/20? Please see below for details.    Pap Hx:  02/28/18: ASCUS Pap, with + HR HPV (not 16 or 18) result. Plan Shanks.   03/06/18 Shanks- bx with mild dysplasia/JOSE I.  Rec f/u pap/HPV cotest in a year at physical.  03/05/19: NIL pap, + HR HPV (not 16 or 18) result. Plan Shanks.   05/1/19: Shanks Bx Focal squamous atypia of undetermined significance. ECC: A small amount of atypical squamous epithelium, favor LSIL.   Plan: f/u pap/HPV cotest in a year.   3/11/20 NIL pap, + HR HPV (not 16/18). Plan: colpo     Thank you,  Jasmina Tristan, RN-Pap Tracking

## 2020-04-22 NOTE — TELEPHONE ENCOUNTER
Great- I mostly wanted to get those guidelines on record and ensure tracking.  Will rec 3-6 months.  Thanks!  CW

## 2020-06-12 ENCOUNTER — OFFICE VISIT (OUTPATIENT)
Dept: OBGYN | Facility: CLINIC | Age: 30
End: 2020-06-12
Payer: COMMERCIAL

## 2020-06-12 VITALS
TEMPERATURE: 97.3 F | DIASTOLIC BLOOD PRESSURE: 71 MMHG | SYSTOLIC BLOOD PRESSURE: 113 MMHG | HEART RATE: 77 BPM | WEIGHT: 116 LBS | BODY MASS INDEX: 22.28 KG/M2

## 2020-06-12 DIAGNOSIS — R87.810 ASCUS WITH POSITIVE HIGH RISK HPV CERVICAL: Primary | ICD-10-CM

## 2020-06-12 DIAGNOSIS — R87.610 ASCUS WITH POSITIVE HIGH RISK HPV CERVICAL: Primary | ICD-10-CM

## 2020-06-12 LAB — HCG UR QL: NEGATIVE

## 2020-06-12 PROCEDURE — 81025 URINE PREGNANCY TEST: CPT | Performed by: OBSTETRICS & GYNECOLOGY

## 2020-06-12 PROCEDURE — 57455 BIOPSY OF CERVIX W/SCOPE: CPT | Performed by: OBSTETRICS & GYNECOLOGY

## 2020-06-12 PROCEDURE — 88305 TISSUE EXAM BY PATHOLOGIST: CPT | Performed by: OBSTETRICS & GYNECOLOGY

## 2020-06-12 NOTE — PROGRESS NOTES
"OB GYN CLINIC VISIT  6/12/2020  CC: colposcopy    HPI:  Bess Ellis is a 30 year old female No obstetric history on file. who presents for initial colposcopy, referred by SYLVESTER BIANCHI. Pap smear on 03/11/20 showed: normal and with high risk HPV present: +other. The prior pap showed normal and with high risk HPV present: +other.     02/28/18: ASCUS Pap, with + HR HPV (not 16 or 18) result. Plan [facility].   03/06/18 [facility]- bx with mild dysplasia/JOSE I.  Rec f/u pap/HPV cotest in a year at hospitals.  03/05/19: NIL pap, + HR HPV (not 16 or 18) result. Plan [facility].   05/1/19: [facility] Bx Focal squamous atypia of undetermined significance. ECC: A small amount of atypical squamous epithelium, favor LSIL.   Plan: f/u pap/HPV cotest in a year.   3/11/20 NIL pap, + HR HPV (not 16/18). Plan: colpo  04/22/20:Per provider, COVID 19 interim plan updated to: [facility] due bef 09/11/20.  6/3/20 Reminder call.  Patient will schedule at [facility]. [facility] closed. Now tracking in CCT    No LMP recorded. (Menstrual status: IUD).  UPT today is negative  Patient does not smoke  Type of contraception: IUD  Age at first sexual intercourse: 21  Number of sexual partners (lifetime): 18  Past GYN history: Chlamydia and HPV  Prior cervical/vaginal disease: Normal exam without visible pathology.  Prior cervical treatment: no treatment.    Vitals:    06/12/20 1515   BP: 113/71   Pulse: 77   Temp: 97.3  F (36.3  C)   TempSrc: Oral   Weight: 52.6 kg (116 lb)     Estimated body mass index is 22.28 kg/m  as calculated from the following:    Height as of 3/11/20: 1.537 m (5' 0.5\").    Weight as of this encounter: 52.6 kg (116 lb).       PROCEDURE:      Before the procedure, it was ensured that the patient was educated regarding the nature of her findings to date, the implications, and what was to be done. She has been made aware of the role of HPV, the natural history of infection, ways to minimize her future risk, the effect of HPV on the cervix, and " treatment options available should they be indicated. The details of the colposcopic procedure were reviewed. All questions were answered before proceeding, and informed consent was therefore obtained.      Speculum placed in vagina and excellent visualization of cervix acheived, cervix swabbed x 3 with acetic acid solution.      FINDINGS:  Cervix: acetowhitening noted at 3 o'clock. IUD strings visible  SCJ seen?: yes   ECC done?: No  Satisfactory examination?: yes    Representative biopsy taken at 3 o'clock. Silver nitrate used to achieve hemostasis at biopsy site. Specimen labeled and sent to pathology. Patient tolerated procedure well.     ASSESSMENT: HPV related changes.  PLAN: specimens labelled and sent to Pathology, will base further treatment on Pathology findings, treatment options discussed with patient, post biopsy instructions given to patient and call to discuss Pathology results    1. ASCUS with positive high risk HPV cervical  - HCG Qual, Urine ([id number])  - Surgical pathology exam  - COLP CERVIX/UPPER VAGINA W BX CERVIX      Taty Feliz MD

## 2020-06-13 ENCOUNTER — NURSE TRIAGE (OUTPATIENT)
Dept: NURSING | Facility: CLINIC | Age: 30
End: 2020-06-13

## 2020-06-14 NOTE — TELEPHONE ENCOUNTER
Patient calling - says she had a colposcopy yesterday afternoon.  Is having some abdominal pain.  Says there is minimal pain that is constant and then the intensity comes and go.  Rates constant pain 1-2/10, then occasionally 4/10.  Cramping started last night.    Says she has had procedure twice in past with no symptoms the next day.    No fever.  No vaginal discharge.  No vaginal bleeding.      Says she was told to call if abdominal pain occurs as there is a risk for infection.    8:41 called [facility] paging (Riaz) to place page to on-call  Provider:Dr. Parker.  Relayed patient information and current symptoms to Dr. Parker.  Per Dr. Parker some abdominal pain and/or cramping can be normal for 1-2 days following the procedure.  Patient should take ibuprofen or tylenol for pain.  If she develops fever, foul smelling vaginal discharge, symptoms worsen or symptoms do not improve by tomorrow she should call back.    8:45 pm called patient and relayed recommendations from Dr. Parker.    Nabila Carpenter RN  Triage Nurse Advisor    COVID 19 Nurse Triage Plan/Patient Instructions    Please be aware that novel coronavirus (COVID-19) may be circulating in the community. If you develop symptoms such as fever, cough, or SOB or if you have concerns about the presence of another infection including coronavirus (COVID-19), please contact your health care provider or visit [email or web address].     Disposition/Instructions    Patient to stay at home and follow home care protocol based instructions.     Thank you for taking steps to prevent the spread of this virus.  o Limit your contact with others.  o Wear a simple mask to cover your cough.  o Wash your hands well and often.    Resources    [facility]: About COVID-19: [email or web address]    CDC: What to Do If You're Sick: www.cdc.gov/coronavirus/2019-ncov/about/steps-when-sick.html    CDC: Ending Home Isolation:  www.cdc.gov/coronavirus/2019-ncov/hcp/disposition-in-home-patients.html     CDC: Caring for Someone: www.cdc.gov/coronavirus/2019-ncov/if-you-are-sick/care-for-someone.html     [facility]: Interim Guidance for Hospital Discharge to Home: www.health.[facility].[place]/diseases/coronavirus/hcp/hospdischarge.pdf    [facility] clinical trials (COVID-19 research studies): [email or web address].[email or web address]/umn-clinical-trials     Below are the COVID-19 hotlines at the [place] Department [facility] ([facility]). Interpreters are available.   o For health questions: Call 895-076-2296 or 1-153.595.9239 (7 a.m. to 7 p.m.)  o For questions about schools and childcare: Call 181-292-4502 or 1-391.925.1974 (7 a.m. to 7 p.m.)     Additional Information    [1] MILD-MODERATE pain AND [2] constant AND [3] present > 2 hours    Negative: Shock suspected (e.g., cold/pale/clammy skin, too weak to stand, low BP, rapid pulse)    Negative: Difficult to awaken or acting confused (e.g., disoriented, slurred speech)    Negative: Passed out (i.e., lost consciousness, collapsed and was not responding)    Negative: Sounds like a life-threatening emergency to the triager    Protocols used: ABDOMINAL PAIN - FEMALE-A-AH

## 2020-06-16 LAB — COPATH REPORT: NORMAL

## 2020-06-17 ENCOUNTER — PATIENT OUTREACH (OUTPATIENT)
Dept: OBGYN | Facility: CLINIC | Age: 30
End: 2020-06-17

## 2020-06-19 NOTE — TELEPHONE ENCOUNTER
Patient notified of results and recommendation for follow up by phone today. All questions answered to her satisfaction. Patient agrees with plan.     FLAKITA MoraN, RN

## 2020-08-21 ENCOUNTER — AMBULATORY - HEALTHEAST (OUTPATIENT)
Dept: FAMILY MEDICINE | Facility: CLINIC | Age: 30
End: 2020-08-21

## 2020-08-21 ENCOUNTER — VIRTUAL VISIT (OUTPATIENT)
Dept: FAMILY MEDICINE | Facility: OTHER | Age: 30
End: 2020-08-21
Payer: COMMERCIAL

## 2020-08-21 DIAGNOSIS — Z20.822 SUSPECTED COVID-19 VIRUS INFECTION: ICD-10-CM

## 2020-08-21 PROCEDURE — 99421 OL DIG E/M SVC 5-10 MIN: CPT | Performed by: NURSE PRACTITIONER

## 2020-08-21 NOTE — PROGRESS NOTES
"Date: 2020 09:17:52  Clinician: Kylah Vogt  Clinician NPI: [id number]  Patient: Bess Ellis  Patient : 1990  Patient Address: [address] [place] [address] [place] [address]  Patient Phone: [phone]  Visit Protocol: URI  Patient Summary:  Bess is a 30 year old ( : 1990 ) female who initiated a Visit for COVID-19 (Coronavirus) evaluation and screening. When asked the question \"Please sign me up to receive news, health information and promotions from [employer].\", Bess responded \"No\".    When asked when her symptoms started, Bess reported that she does not have any symptoms.   She denies having recent facial or sinus surgery in the past 60 days and taking antibiotic medication in the past month.    Pertinent COVID-19 (Coronavirus) information  In the past 14 days, Bess has not worked in a congregate living setting.   She does not work or volunteer as healthcare worker or a  and does not work or volunteer in a healthcare facility.   Bess also has not lived in a congregate living setting in the past 14 days. She does not live with a healthcare worker.   Bess has had a close contact with a laboratory-confirmed COVID-19 patient in the last 14 days. She was not exposed at her work. Additional information about contact with COVID-19 (Coronavirus) patient as reported by the patient (free text): I was with my friend Winston this weekend on several occasions, both inside an enclosed space with no masks or distancing and outdoors.    Patient reported they are not living in the same household with a COVID-19 positive patient.  Patient was in an enclosed space for greater than 15 minutes with a COVID-19 patient.  Since 2019, Bess and has not had upper respiratory infection or influenza-like illness. Has not been diagnosed with lab-confirmed COVID-19 test   Pertinent medical history  Bess does not get yeast infections when she takes antibiotics.   Bess does not need a return to " work/school note.   Weight: 114 lbs   Bess does not smoke or use smokeless tobacco.   She denies pregnancy and denies breastfeeding. She does not menstruate.   Weight: 114 lbs    MEDICATIONS: spironolactone oral, tretinoin topical, Digestive Probiotic oral, levonorgestrel intrauterine, B Complex-Vitamin B12 oral, biotin oral, Citrus Calcium-Vitamin D3 oral, Fish Oil oral, hydrocortisone topical, clindamycin phosphate topical, ALLERGIES: NKDA  Clinician Response:  Dear Bess,         Your symptoms show that you may have coronavirus (COVID-19). This&nbsp;illness can cause fever, cough and trouble breathing. Many people get a mild case and get better on their own. Some people can get very sick.  What should I do?  We would like to test you for this virus.  1. Please call [phone] to schedule your visit. Explain that you were referred by [facility] to have a COVID-19 test. Be ready to share your [facility] visit ID number.  The following will serve as your written order for this COVID Test, ordered by me, for the indication of suspected COVID [Z20.828]: The test will be ordered in [employer], our electronic health record, after you are scheduled. It will show as ordered and authorized by Alvaro Wells MD.  Order: COVID-19 (Coronavirus) PCR for SYMPTOMATIC testing from [facility].    2. When it's time for your COVID test:  Stay at least 6 feet away from others. (If someone will drive you to your test, stay in the backseat, as far away from the  as you can.)  Cover your mouth and nose with a mask, tissue or washcloth.  Go straight to the testing site. Don't make any stops on the way there or back.    3.Starting now:&nbsp;Stay home and away from others (self-isolate) until:   You've had&nbsp;no&nbsp;fever---and no medicine that reduces fever---for one full day (24 hours).&nbsp;And...  Your other symptoms have gotten better. For example, your cough or breathing has improved.&nbsp;And...  At least&nbsp;10 days&nbsp;have passed since  "your symptoms started.    During this time, don't leave the house except for testing or medical care.   Stay in your own room, even for meals. Use your own bathroom if you can.  Stay away from others in your home. No hugging, kissing or shaking hands. No visitors.  Don't go to work, school or anywhere else.   Clean \"high touch\" surfaces often (doorknobs, counters, handles, etc.). Use a household cleaning spray or wipes. You'll find a full list of  on the EPA website:&nbsp;www.epa.gov/pesticide-registration/list-n-disinfectants-use-against-sars-cov-2.   Cover your mouth and nose with a mask, tissue or washcloth to avoid spreading germs.  Wash your hands and face often. Use soap and water.  Caregivers in these groups are at risk for severe illness due to COVID-19:  o People 65 years and older  o People who live in a nursing home or long-term care facility  o People with chronic disease (lung, heart, cancer, diabetes, kidney, liver, immunologic)  o People who have a weakened immune system, including those who:   Are in cancer treatment  Take medicine that weakens the immune system, such as corticosteroids  Had a bone marrow or organ transplant  Have an immune deficiency  Have poorly controlled HIV or AIDS  Are obese (body mass index of 40 or higher)  Smoke regularly   o Caregivers should wear gloves while washing dishes, handling laundry and cleaning bedrooms and bathrooms.  o Use caution when washing and drying laundry: Don't shake dirty laundry, and use the warmest water setting that you can.  o For more tips, go to&nbsp;www.cdc.gov/coronavirus/2019-ncov/downloads/10Things.pdf.   How can I take care of myself?    Get lots of rest. Drink extra fluids&nbsp;(unless a doctor has told you not to).  Take Tylenol (acetaminophen) for fever or pain.&nbsp;If you have liver or kidney problems, ask your family doctor if it's okay to take Tylenol.   Adults can take either:   650 mg (two 325 mg pills) every 4 to 6 " hours,&nbsp;or...  1,000 mg (two 500 mg pills) every 8 hours as needed.  Note:&nbsp;Don't take more than 3,000 mg in one day. Acetaminophen is found in many medicines (both prescribed and over-the-counter medicines). Read all labels to be sure you don't take too much.   For children, check the Tylenol bottle for the right dose. The dose is based on the child's age or weight.   If you have other health problems (like cancer, heart failure, an organ transplant or severe kidney disease):&nbsp;Call your specialty clinic if you don't feel better in the next 2 days.    Know when to call 911.&nbsp;Emergency warning signs include:   Trouble breathing or shortness of breath Pain or pressure in the chest that doesn't go away Feeling confused like you haven't felt before, or not being able to wake up Bluish-colored lips or face.  Where can I get more information?   [facility] -- About COVID-19:&nbsp;[email or web address]  CDC -- What to Do If You're Sick:&nbsp;www.cdc.gov/coronavirus/2019-ncov/about/steps-when-sick.html  CDC -- Ending Home Isolation:&nbsp;www.cdc.gov/coronavirus/2019-ncov/hcp/disposition-in-home-patients.html  [facility] -- Caring for Someone:&nbsp;www.cdc.gov/coronavirus/2019-ncov/if-you-are-sick/care-for-someone.html  [facility] -- Interim Guidance for Hospital Discharge to Home:&nbsp;www.health.[facility].[place]/diseases/coronavirus/hcp/hospdischarge.pdf  [facility] clinical trials (COVID-19 research studies):&nbsp;clinicalaffairs.[facility]/n-clinical-trials  Below are the COVID-19 hotlines at the [facility] ([facility]). Interpreters are available.   For health questions: Call 769-064-8396 or 1-510.807.1270 (7 a.m. to 7 p.m.) For questions about schools and childcare: Call 464-048-1298 or 1-503.766.3945 (7 a.m. to 7 p.m.)           Diagnosis: Contact with and (suspected) exposure to other viral communicable diseases  Diagnosis ICD: Z20.828

## 2020-09-01 DIAGNOSIS — Z20.822 SUSPECTED COVID-19 VIRUS INFECTION: Primary | ICD-10-CM

## 2020-09-02 LAB
SARS-COV-2 RNA SPEC QL NAA+PROBE: NOT DETECTED
SPECIMEN SOURCE: NORMAL

## 2020-11-06 ENCOUNTER — OFFICE VISIT (OUTPATIENT)
Dept: OPHTHALMOLOGY | Facility: CLINIC | Age: 30
End: 2020-11-06
Payer: COMMERCIAL

## 2020-11-06 DIAGNOSIS — Z98.890 HX OF LASIK: ICD-10-CM

## 2020-11-06 DIAGNOSIS — H04.123 DRY EYE SYNDROME OF BOTH EYES: Primary | ICD-10-CM

## 2020-11-06 PROCEDURE — G0463 HOSPITAL OUTPT CLINIC VISIT: HCPCS

## 2020-11-06 PROCEDURE — 92014 COMPRE OPH EXAM EST PT 1/>: CPT | Mod: GC | Performed by: OPHTHALMOLOGY

## 2020-11-06 PROCEDURE — 92015 DETERMINE REFRACTIVE STATE: CPT

## 2020-11-06 ASSESSMENT — CUP TO DISC RATIO
OD_RATIO: 0.2
OS_RATIO: 0.25

## 2020-11-06 ASSESSMENT — SLIT LAMP EXAM - LIDS: COMMENTS: MILD MGD

## 2020-11-06 ASSESSMENT — TONOMETRY
IOP_METHOD: TONOPEN
OS_IOP_MMHG: 14
OD_IOP_MMHG: 17

## 2020-11-06 ASSESSMENT — EXTERNAL EXAM - RIGHT EYE: OD_EXAM: NORMAL

## 2020-11-06 ASSESSMENT — EXTERNAL EXAM - LEFT EYE: OS_EXAM: NORMAL

## 2020-11-06 ASSESSMENT — VISUAL ACUITY
OS_PH_SC: 20/20
OD_PH_SC: 20/20
METHOD: SNELLEN - LINEAR
OS_SC: 20/25
OD_SC: 20/30
OD_PH_SC+: -1

## 2020-11-06 ASSESSMENT — REFRACTION_MANIFEST
OS_SPHERE: -0.75
OD_CYLINDER: +0.50
OS_CYLINDER: SPHERE
OD_AXIS: 165
OD_SPHERE: -1.25

## 2020-11-06 ASSESSMENT — CONF VISUAL FIELD
OS_NORMAL: 1
METHOD: COUNTING FINGERS
OD_NORMAL: 1

## 2020-11-06 NOTE — PROGRESS NOTES
CC - Annual eye exam    INTERVAL HISTORY - Initial visit with me. Presenting for annual eye exam. Using a mask at night while sleeping and seems to help with the eyelids being stuck together in the morning. She also rinses the eyes. Uses Systane 2-4x/day which helps. Overall, her vision is good and she is happy without glasses. She would prefer to not use glasses. She denies flashes or floaters. No photophobia.     HPI -   Bess Ellis is a  30 year old year-old patient with history of LASIK OU (2013) and dry eyes.       PAST OCULAR SURGERY  LASIK OU (2013)    RETINAL IMAGING:  None      ASSESSMENT & PLAN    # Bilateral dry eyes   - Predominantly MGD, possibly exacerbated by h/o LASIK   - Lid hygiene (baby shampoo)   - Warm compresses 1-2x/day   - Humidifier in bedroom    - Continue PFATs QID OU    - Continue warm compress BID   - Consider punctal plugs in 2 months if no improvement w/ above.     # H/o LASIK   - LASIK flap in good position.    - BCVA 20/15 in each eye      return to clinic: 2 months for VT, possible punctal plugs      Patient seen and discussed with Dr. Bailey.    Jeremías Keller MD  Ophthalmology PGY-3  [facility]     ATTESTATION     Attending Attestation:     Complete documentation of historical and exam elements from today's encounter can be found in the full encounter summary report (not reduplicated in this progress note).  I personally obtained the chief complaint(s) and history of present illness.  I confirmed and edited as necessary the review of systems, past medical/surgical history, family history, social history, and examination findings as documented by others; and I examined the patient myself.  I personally reviewed the relevant tests, images, and reports as documented above.  I formulated and edited as necessary the assessment and plan and discussed the findings and management plan with the patient and family    Wen Bailey MD, PhD  ,  Vitreoretinal Surgery  Department of Ophthalmology  [facility]

## 2020-11-06 NOTE — PATIENT INSTRUCTIONS
For use at bedtime:  Refresh PM (ointment)   Systane PM (ointment)    Systane Ultra (preservative free)  Refresh celluvisc (preservative free)    Warm compresses (rice in sock method) 1-2x/day  Use baby shampoo to scrub the eyelids when you shower.    We can consider punctal plugs in 2 months if you are still symptomatic.     -----------------------------------------  DRY EYE INSTRUCTIONS    You have dry eyes.   Artificial tears may be helpful.  Please see the list of brands below.  You may use preserved artifical tears (in a muti-use bottle) 2-4 times per day.  Do not use preserved tears more than 4 times per day.  If you wish to use artifical tears more than 4 times per day, you should use preservative-free artifical tears.  These come in single-use vials.  You can open a new vial each day to use throughout the day, but it should be discarded at the end of the day.  Thicker tears, gels, and ointments are more effective, but they may blur your vision.  Some patients prefer to use those only at bedtime.    You may also benefit from washing your eyelashes (not your eye lids) gently using your finger once or twice per day with tap water.    You may also benefit from warm compresses once or twice per day to your eyelids.  Use a clean washcloth soaked in warm water, and place it over your eyes for 5 minutes.    Avoid medicated drops for red eyes which contain vasoconstrictors.  These should not be used for more than a few days in a row, as the medication can cause serious problems if used for too many days in a row.      Example Recommended Artificial Tear Brands:    Dry Eye Drops    Optive  Systane Ultra  TheraTears  Genteal  Refresh  Blink Tears  Soothe      Gels    Refresh Liquigel  Genteal Gel  TheraTears Gel  Celluvisc  Blink Gel  Systane Gel      Ointments    Refresh PM  Lacrilube  Systane PM      Contact Lens Compatible    Blink Contacts  Blink Tears  Refresh Contacts  Systane Ultra  Complete Lubricating and  Rewetting  Complete Blink and Clean Lens Drops

## 2020-11-06 NOTE — NURSING NOTE
Chief Complaints and History of Present Illnesses   Patient presents with     Dry Eye(s) Both Eyes     Chief Complaint(s) and History of Present Illness(es)     Dry Eye(s) Both Eyes     Laterality: both eyes              Comments     Pt states vision is not as clear as it used to be. Pt s/p Lasik 2013.  No eye pain today. Dryness in both eyes, limited relief with systane drops.  No flashes or floaters.    Pt has questions about Lumify eye drops. Pt tried it one time but it made her eyes feel irritated.    GILMA Mott November 6, 2020 8:18 AM

## 2021-03-19 ENCOUNTER — PATIENT OUTREACH (OUTPATIENT)
Dept: FAMILY MEDICINE | Facility: CLINIC | Age: 31
End: 2021-03-19

## 2021-03-19 ENCOUNTER — OFFICE VISIT (OUTPATIENT)
Dept: FAMILY MEDICINE | Facility: CLINIC | Age: 31
End: 2021-03-19
Payer: COMMERCIAL

## 2021-03-19 VITALS
HEART RATE: 68 BPM | WEIGHT: 116 LBS | HEIGHT: 60 IN | DIASTOLIC BLOOD PRESSURE: 71 MMHG | BODY MASS INDEX: 22.78 KG/M2 | SYSTOLIC BLOOD PRESSURE: 109 MMHG | TEMPERATURE: 97.7 F | OXYGEN SATURATION: 100 %

## 2021-03-19 DIAGNOSIS — L70.0 ACNE VULGARIS: ICD-10-CM

## 2021-03-19 DIAGNOSIS — R87.810 ASCUS WITH POSITIVE HIGH RISK HPV CERVICAL: ICD-10-CM

## 2021-03-19 DIAGNOSIS — R87.610 ASCUS WITH POSITIVE HIGH RISK HPV CERVICAL: ICD-10-CM

## 2021-03-19 DIAGNOSIS — Z11.3 SCREEN FOR STD (SEXUALLY TRANSMITTED DISEASE): ICD-10-CM

## 2021-03-19 DIAGNOSIS — N91.5 OLIGOMENORRHEA, UNSPECIFIED TYPE: ICD-10-CM

## 2021-03-19 DIAGNOSIS — L68.0 HIRSUTISM: ICD-10-CM

## 2021-03-19 DIAGNOSIS — Z00.00 ROUTINE GENERAL MEDICAL EXAMINATION AT A HEALTH CARE FACILITY: Primary | ICD-10-CM

## 2021-03-19 DIAGNOSIS — N89.8 VAGINAL DRYNESS: ICD-10-CM

## 2021-03-19 DIAGNOSIS — L70.9 ACNE, UNSPECIFIED ACNE TYPE: ICD-10-CM

## 2021-03-19 LAB
ANION GAP SERPL CALCULATED.3IONS-SCNC: 5 MMOL/L (ref 3–14)
BUN SERPL-MCNC: 12 MG/DL (ref 7–30)
CALCIUM SERPL-MCNC: 9.2 MG/DL (ref 8.5–10.1)
CHLORIDE SERPL-SCNC: 106 MMOL/L (ref 94–109)
CHOLEST SERPL-MCNC: 138 MG/DL
CO2 SERPL-SCNC: 26 MMOL/L (ref 20–32)
CREAT SERPL-MCNC: 0.78 MG/DL (ref 0.52–1.04)
ESTRADIOL SERPL-MCNC: 91 PG/ML
FSH SERPL-ACNC: 3 IU/L
GFR SERPL CREATININE-BSD FRML MDRD: >90 ML/MIN/{1.73_M2}
GLUCOSE SERPL-MCNC: 76 MG/DL (ref 70–99)
HBA1C MFR BLD: 4.5 % (ref 0–5.6)
HCG SERPL QL: NEGATIVE
HDLC SERPL-MCNC: 59 MG/DL
HIV 1+2 AB+HIV1 P24 AG SERPL QL IA: NONREACTIVE
LDLC SERPL CALC-MCNC: 67 MG/DL
LH SERPL-ACNC: 10.5 IU/L
NONHDLC SERPL-MCNC: 79 MG/DL
POTASSIUM SERPL-SCNC: 3.7 MMOL/L (ref 3.4–5.3)
PROLACTIN SERPL-MCNC: 22 UG/L (ref 3–27)
SODIUM SERPL-SCNC: 137 MMOL/L (ref 133–144)
T PALLIDUM AB SER QL: NONREACTIVE
TRIGL SERPL-MCNC: 58 MG/DL
TSH SERPL DL<=0.005 MIU/L-ACNC: 1.88 MU/L (ref 0.4–4)

## 2021-03-19 PROCEDURE — 82670 ASSAY OF TOTAL ESTRADIOL: CPT | Performed by: FAMILY MEDICINE

## 2021-03-19 PROCEDURE — 84703 CHORIONIC GONADOTROPIN ASSAY: CPT | Performed by: FAMILY MEDICINE

## 2021-03-19 PROCEDURE — 80061 LIPID PANEL: CPT | Performed by: FAMILY MEDICINE

## 2021-03-19 PROCEDURE — 80048 BASIC METABOLIC PNL TOTAL CA: CPT | Performed by: FAMILY MEDICINE

## 2021-03-19 PROCEDURE — 87389 HIV-1 AG W/HIV-1&-2 AB AG IA: CPT | Performed by: FAMILY MEDICINE

## 2021-03-19 PROCEDURE — 87491 CHLMYD TRACH DNA AMP PROBE: CPT | Performed by: FAMILY MEDICINE

## 2021-03-19 PROCEDURE — 84443 ASSAY THYROID STIM HORMONE: CPT | Performed by: FAMILY MEDICINE

## 2021-03-19 PROCEDURE — 83001 ASSAY OF GONADOTROPIN (FSH): CPT | Performed by: FAMILY MEDICINE

## 2021-03-19 PROCEDURE — 99000 SPECIMEN HANDLING OFFICE-LAB: CPT | Performed by: FAMILY MEDICINE

## 2021-03-19 PROCEDURE — 86780 TREPONEMA PALLIDUM: CPT | Mod: 90 | Performed by: FAMILY MEDICINE

## 2021-03-19 PROCEDURE — 36415 COLL VENOUS BLD VENIPUNCTURE: CPT | Performed by: FAMILY MEDICINE

## 2021-03-19 PROCEDURE — 87591 N.GONORRHOEAE DNA AMP PROB: CPT | Performed by: FAMILY MEDICINE

## 2021-03-19 PROCEDURE — 99214 OFFICE O/P EST MOD 30 MIN: CPT | Mod: 25 | Performed by: FAMILY MEDICINE

## 2021-03-19 PROCEDURE — 83036 HEMOGLOBIN GLYCOSYLATED A1C: CPT | Performed by: FAMILY MEDICINE

## 2021-03-19 PROCEDURE — 84146 ASSAY OF PROLACTIN: CPT | Performed by: FAMILY MEDICINE

## 2021-03-19 PROCEDURE — 84403 ASSAY OF TOTAL TESTOSTERONE: CPT | Mod: 90 | Performed by: FAMILY MEDICINE

## 2021-03-19 PROCEDURE — 99395 PREV VISIT EST AGE 18-39: CPT | Performed by: FAMILY MEDICINE

## 2021-03-19 PROCEDURE — 87624 HPV HI-RISK TYP POOLED RSLT: CPT | Performed by: FAMILY MEDICINE

## 2021-03-19 PROCEDURE — 83002 ASSAY OF GONADOTROPIN (LH): CPT | Performed by: FAMILY MEDICINE

## 2021-03-19 RX ORDER — SPIRONOLACTONE 50 MG/1
25 TABLET, FILM COATED ORAL DAILY
COMMUNITY
End: 2021-03-19

## 2021-03-19 RX ORDER — SPIRONOLACTONE 50 MG/1
50 TABLET, FILM COATED ORAL DAILY
COMMUNITY

## 2021-03-19 RX ORDER — TRETINOIN 0.1 MG/G
GEL TOPICAL AT BEDTIME
COMMUNITY

## 2021-03-19 ASSESSMENT — MIFFLIN-ST. JEOR: SCORE: 1178.92

## 2021-03-19 NOTE — PROGRESS NOTES
SUBJECTIVE:   CC: Bess Ellis is an 31 year old woman who presents for preventive health visit.       Patient has been advised of split billing requirements and indicates understanding: Yes  Healthy Habits:     Getting at least 3 servings of Calcium per day:  Yes    Bi-annual eye exam:  Yes    Dental care twice a year:  Yes    Sleep apnea or symptoms of sleep apnea:  Daytime drowsiness    Diet:  Vegetarian/vegan    Frequency of exercise:  6-7 days/week    Duration of exercise:  45-60 minutes    Taking medications regularly:  Yes    Medication side effects:  None    PHQ-2 Total Score: 0    Additional concerns today:  Yes    Concerns-   Potassium check?  On spironolactone- unsure dose.  Derm rec a check.  On it since June/July- helping with the acne.    On mirena IUD- she said she could switch to a combined OCP and likely stop the spironolactone, but pt is more interested in staying with the mirena IUD.  No issue on it.    Wondering about hormone check?  Looking through   Vaginal dryness.  Inner labia can get swollen after intercourse at times.    She's been reading about fitness things, heard that it's helpful and important to check hormones, other things?    TSH check?  Intermittent diarrhea.    PCOS- laser txt of chin and lip hair, acne (hormonal acne in the last 3-4 yrs - mirena since 2016).  Irregular period when off OCPs/IUD- 1 1/2 month cycles.  No fam h/o PCOs.    Weight is low, body fat 20.8%.  Feels like she has more stomach fat that she should at that body fat percentage.    Chlamydia in 2012- thinks she had PID based sx's.  Horrible pelvic pain at the time.    Bad pain for about a week.  Improved with chlamydia txt.  Doesn't think she had persistent sx's.  Doesn't have one-sided pelvic pain.  Does get pelvic cramping- unsure if worse since that time.  Sometimes pain with intercourse- position related- not a big issue.  Knows to avoid.          Hormaone and potassium level checked, IUD expires in the  fall, Discuss potential impacts of PID, Biometric form     Today's PHQ-2 Score:   PHQ-2 ( 1999 Pf[employer]) 3/18/2021   Q1: Little interest or pleasure in doing things 0   Q2: Feeling down, depressed or hopeless 0   PHQ-2 Score 0   Q1: Little interest or pleasure in doing things Not at all   Q2: Feeling down, depressed or hopeless Not at all   PHQ-2 Score 0       Abuse: Current or Past (Physical, Sexual or Emotional) - No  Do you feel safe in your environment? Yes    Have you ever done Advance Care Planning? (For example, a Health Directive, POLST, or a discussion with a medical provider or your loved ones about your wishes): No, advance care planning information given to patient to review.  Patient declined advance care planning discussion at this time.    Social History     Tobacco Use     Smoking status: Never Smoker     Smokeless tobacco: Never Used   Substance Use Topics     Alcohol use: Yes     Comment: 1-2 times a week couple drinks          Alcohol Use 3/18/2021   Prescreen: >3 drinks/day or >7 drinks/week? No   Prescreen: >3 drinks/day or >7 drinks/week? -         Reviewed orders with patient.  Reviewed health maintenance and updated orders accordingly - Yes      Lab work is in process  Labs reviewed in EPIC  BP Readings from Last 3 Encounters:   03/19/21 109/71   06/12/20 113/71   03/11/20 112/78    Wt Readings from Last 3 Encounters:   03/19/21 52.6 kg (116 lb)   06/12/20 52.6 kg (116 lb)   03/11/20 52.1 kg (114 lb 14.4 oz)                  Patient Active Problem List   Diagnosis     IUD contraception     Other irritable bowel syndrome     ASCUS with positive high risk HPV cervical     Acne vulgaris     Past Surgical History:   Procedure Laterality Date     BIOPSY OF SKIN LESION  07/2017    atypical nevus, benign     LASIK  07/2013       Social History     Tobacco Use     Smoking status: Never Smoker     Smokeless tobacco: Never Used   Substance Use Topics     Alcohol use: Yes     Comment: 1-2 times a week  couple drinks      Family History   Problem Relation Age of Onset     Cancer Mother 57        CLL, donig well, no txt     Gastrointestinal Disease Mother         IBS     Gastrointestinal Disease Sister         IBS     Depression Sister      Cancer Maternal Grandmother 65        CML, had txt,  at 90, unsure cause of death     Coronary Artery Disease Maternal Grandfather         later in life     Colon Cancer Paternal Grandmother         60s     Coronary Artery Disease Paternal Grandfather         later in life     Colon Cancer Paternal Grandfather         60s     Glaucoma No family hx of      Macular Degeneration No family hx of          Current Outpatient Medications   Medication Sig Dispense Refill     Biotin 1 MG CAPS        CALCIUM-VITAMIN D PO Take 600 mg by mouth        clindamycin (CLINDAMAX) 1 % external gel        Cyanocobalamin (B-12 PO)        hydrocortisone 2.5 % cream        levonorgestrel (MIRENA, 52 MG,) 20 MCG/24HR IUD 1 each by Intrauterine route once Inserted 10/2016       Omega-3 Fatty Acids (FISH OIL) 1200 MG capsule Take 1,200 mg by mouth daily       Probiotic Product (PROBIOTIC DAILY PO)        spironolactone (ALDACTONE) 50 MG tablet Take 50 mg by mouth daily Through dermatology       tretinoin (RETIN-A) 0.01 % external gel Apply topically At Bedtime       triamcinolone (KENALOG) 0.1 % ointment Apply sparingly to affected area three times daily for 14 days. 30 g 0     No Known Allergies  Recent Labs   Lab Test 21  0853   A1C 4.5        Breast CA Risk Screening:  Breast CA Risk Assessment (FHS-7) 3/18/2021   Do you have a family history of breast, colon, or ovarian cancer? No / Unknown       Patient under 40 years of age: Routine Mammogram Screening not recommended.   Pertinent mammograms are reviewed under the imaging tab.    History of abnormal Pap smear: YES - updated in Problem List and Health Maintenance accordingly  PAP / HPV Latest Ref Rng & Units 3/11/2020 3/5/2019 2018  "  PAP - NIL NIL ASC-US(A)   HPV 16 DNA NEG:Negative Negative Negative Negative   HPV 18 DNA NEG:Negative Negative Negative Negative   OTHER HR HPV NEG:Negative Positive(A) Positive(A) Positive(A)     Reviewed and updated as needed this visit by clinical staff  Tobacco  Allergies  Meds  Problems  Med Hx  Surg Hx  Fam Hx  Soc Hx          Reviewed and updated as needed this visit by Provider  Tobacco  Allergies  Meds  Problems  Med Hx  Surg Hx  Fam Hx             Review of Systems  10 point ROS of systems including Constitutional, Eyes, Respiratory, Cardiovascular, Gastroenterology, Genitourinary, Integumentary, Muscularskeletal, Psychiatric were all negative except for pertinent positives noted in my HPI.       OBJECTIVE:   /71 (BP Location: Left arm, Patient Position: Sitting, Cuff Size: Adult Regular)   Pulse 68   Temp 97.7  F (36.5  C)   Ht 1.534 m (5' 0.39\")   Wt 52.6 kg (116 lb)   SpO2 100%   BMI 22.36 kg/m    Physical Exam  GENERAL: healthy, alert and no distress  EYES: Eyes grossly normal to inspection, PERRL and conjunctivae and sclerae normal  HENT: ear canals and TM's normal, nose and mouth without ulcers or lesions  NECK: no adenopathy, no asymmetry, masses, or scars and thyroid normal to palpation  RESP: lungs clear to auscultation - no rales, rhonchi or wheezes  BREAST: normal without masses, tenderness or nipple discharge and no palpable axillary masses or adenopathy  CV: regular rate and rhythm, normal S1 S2, no S3 or S4, no murmur, click or rub, no peripheral edema and peripheral pulses strong  ABDOMEN: soft, nontender, no hepatosplenomegaly, no masses and bowel sounds normal   (female): normal female external genitalia, normal urethral meatus, vaginal mucosa pink, moist, well rugated, and normal cervix/adnexa/uterus without masses or discharge  MS: no gross musculoskeletal defects noted, no edema  SKIN: no suspicious lesions or rashes  NEURO: Normal strength and tone, " mentation intact and speech normal  PSYCH: mentation appears normal, affect normal/bright    Diagnostic Test Results:  Labs reviewed in Epic  Results for orders placed or performed in visit on 03/19/21 (from the past 24 hour(s))   Follicle stimulating hormone   Result Value Ref Range    FSH 3.0 IU/L   Lutropin   Result Value Ref Range    Lutropin 10.5 IU/L   Prolactin   Result Value Ref Range    Prolactin 22 3 - 27 ug/L   HCG qualitative, Blood ([id number])   Result Value Ref Range    HCG Qualitative Serum Negative NEG^Negative   Estradiol   Result Value Ref Range    Estradiol 91 pg/mL   HIV Antigen Antibody Combo   Result Value Ref Range    HIV Antigen Antibody Combo Nonreactive NR^Nonreactive       Treponema Abs w Reflex to RPR and Titer   Result Value Ref Range    Treponema Antibodies Nonreactive NR^Nonreactive   Hemoglobin A1c   Result Value Ref Range    Hemoglobin A1C 4.5 0 - 5.6 %       ASSESSMENT/PLAN:       ICD-10-CM    1. Routine general medical examination at a health care facility  Z00.00 Pap imaged thin layer diagnostic with HPV (select HPV order below)     Lipid panel reflex to direct LDL Fasting     Hemoglobin A1c   2. Oligomenorrhea, unspecified type  N91.5 Testosterone total     TSH with free T4 reflex     Follicle stimulating hormone     Lutropin     Prolactin     HCG qualitative, Blood ([id number])     Anti-Mullerian Hormone (AMH) ([facility])     Estradiol   3. Acne, unspecified acne type  L70.9 Testosterone total     TSH with free T4 reflex     Follicle stimulating hormone     Lutropin     Prolactin     HCG qualitative, Blood ([id number])     Anti-Mullerian Hormone (AMH) ([facility])     Estradiol   4. Hirsutism  L68.0 Testosterone total     TSH with free T4 reflex     Follicle stimulating hormone     Lutropin     Prolactin     HCG qualitative, Blood ([id number])     Anti-Mullerian Hormone (AMH) ([facility])     Estradiol   5. Vaginal dryness  N89.8 Testosterone total     TSH with free T4 reflex     Follicle stimulating  hormone     Lutropin     Prolactin     HCG qualitative, Blood ([id number])     Anti-Mullerian Hormone (AMH) ([facility])     Estradiol   6. Screen for STD (sexually transmitted disease)  Z11.3 NEISSERIA GONORRHOEA PCR     CHLAMYDIA TRACHOMATIS PCR     HIV Antigen Antibody Combo     Treponema Abs w Reflex to RPR and Titer   7. Acne vulgaris  L70.0 Basic metabolic panel  (Ca, Cl, CO2, Creat, Gluc, K, Na, BUN)   8. ASCUS with positive high risk HPV cervical  R87.610 Pap imaged thin layer diagnostic with HPV (select HPV order below)    R87.810      CPE - 32 yo here for preventive visit. We discussed ways to maintain a healthy lifestyle with sensible eating, regular exercise and self cares. We dicussed calcium and Vitamin D intake, vaccinations and preventive health screens.  See orders above for tests ordered and screening needed.  STD screen done today. Diagnostic pap done today to f/u last year's colpo in 6/20 (just a bit early).    Other concerns- wondering about PCOS, hormonal issues, vaginal dryness, weight distribution.    H/o oligomenorrhea- spaced out periods when she was not on contraception (currently no periods on mirena IUD, but still with cramps).  Also with acne, and hirsutism (needing laser txt on chin/lips).  Also has vaginal dryness.  To r/o PCOS, will check labs as above.  For vaginal dryness, will also rec silicone lubricant.    H/o possible PID- h/o chlamydia in ~~2012, but recalls having severe bilateral pelvic pain at the time, which resolved with the chlamydia txt.  No further pelvic pain, no significant pain with intercourse.  Discussed likely no intervention or work-up needed now, but if having fertility concerns, consider ultrasound and/or HSG for evaluation.    Acne- seeing dermatology, on spironolactone 50mg/d (pt confirmed dose via mychart after appt) and retin-a.  Doing well on this regimen. Will check potassium as part of her labs.      Patient has been advised of split billing requirements and  "indicates understanding: Yes  COUNSELING:  Reviewed preventive health counseling, as reflected in patient instructions    Estimated body mass index is 22.36 kg/m  as calculated from the following:    Height as of this encounter: 1.534 m (5' 0.39\").    Weight as of this encounter: 52.6 kg (116 lb).        She reports that she has never smoked. She has never used smokeless tobacco.      Counseling Resources:  ATP IV Guidelines  Pooled Cohorts Equation Calculator  Breast Cancer Risk Calculator  BRCA-Related Cancer Risk Assessment: FHS-7 Tool  FRAX Risk Assessment  ICSI Preventive Guidelines  Dietary Guidelines for Americans, 2010  USDA's MyPlate  ASA Prophylaxis  Lung CA Screening    Tati Gruber MD  [facility]  "

## 2021-03-23 LAB
COPATH REPORT: NORMAL
PAP: NORMAL
TESTOST SERPL-MCNC: 33 NG/DL (ref 8–60)

## 2021-03-24 ENCOUNTER — TELEPHONE (OUTPATIENT)
Dept: FAMILY MEDICINE | Facility: CLINIC | Age: 31
End: 2021-03-24

## 2021-03-24 NOTE — TELEPHONE ENCOUNTER
Biometric forms from patient's recent physical.  Results back, form filled out and signed.  Will bring to tower.  Please make copy for chart, fax, and send original back to pt.  Thanks!  CW

## 2021-03-24 NOTE — TELEPHONE ENCOUNTER
Form Faxed, Copied for Chart, mailed to Patient.  [facility] Unit Coordinator

## 2021-03-29 NOTE — RESULT ENCOUNTER NOTE
Here are your lab results from your recent visit...  -Your STD labs (gonorrhea, chlamydia, HIV and syphilis) are all negative.  -Your TSH (thyroid stimulating hormone, which is elevated in hypothyroidism, and lowered in hyperthyroidism) is normal.  --Your hemoglobin A1C (the three month average of your glucose levels) is normal as well.  -Your cholesterol panel looks great with a low LDL (the bad cholesterol) and a good HDL (the good cholesterol).   -Your basic metabolic panel (which includes electrolyte levels, blood sugar level and kidney function tests) also looks normal.  -Your hormone/PCOS labs- prolactin, lutropin, FSH, testosterone- all look good/normal.  -Your pregnancy test was negative.    Please let me know if you have any questions.  Best,   Nael Gruber MD

## 2021-07-12 ENCOUNTER — MYC MEDICAL ADVICE (OUTPATIENT)
Dept: FAMILY MEDICINE | Facility: CLINIC | Age: 31
End: 2021-07-12

## 2021-07-13 NOTE — TELEPHONE ENCOUNTER
I'm not sure why she got the PCR test email/request, would ignore it.  I don't see that the second Moderna shot was entered.    Thanks- CW

## 2021-07-15 NOTE — TELEPHONE ENCOUNTER
Informed pt below.  Added 2nd vaccine under comments of first one instead of new date by mistake.  Reentered 4/14/21.    Miracle Beal RN

## 2021-08-25 ENCOUNTER — VIRTUAL VISIT (OUTPATIENT)
Dept: FAMILY MEDICINE | Facility: CLINIC | Age: 31
End: 2021-08-25
Payer: COMMERCIAL

## 2021-08-25 DIAGNOSIS — F41.9 MILD ANXIETY: ICD-10-CM

## 2021-08-25 DIAGNOSIS — F32.0 MILD MAJOR DEPRESSION (H): Primary | ICD-10-CM

## 2021-08-25 PROCEDURE — 96127 BRIEF EMOTIONAL/BEHAV ASSMT: CPT | Mod: 95 | Performed by: FAMILY MEDICINE

## 2021-08-25 PROCEDURE — 99213 OFFICE O/P EST LOW 20 MIN: CPT | Mod: 95 | Performed by: FAMILY MEDICINE

## 2021-08-25 ASSESSMENT — PATIENT HEALTH QUESTIONNAIRE - PHQ9
SUM OF ALL RESPONSES TO PHQ QUESTIONS 1-9: 8
10. IF YOU CHECKED OFF ANY PROBLEMS, HOW DIFFICULT HAVE THESE PROBLEMS MADE IT FOR YOU TO DO YOUR WORK, TAKE CARE OF THINGS AT HOME, OR GET ALONG WITH OTHER PEOPLE: SOMEWHAT DIFFICULT
SUM OF ALL RESPONSES TO PHQ QUESTIONS 1-9: 8

## 2021-08-25 ASSESSMENT — ANXIETY QUESTIONNAIRES
GAD7 TOTAL SCORE: 6
5. BEING SO RESTLESS THAT IT IS HARD TO SIT STILL: SEVERAL DAYS
6. BECOMING EASILY ANNOYED OR IRRITABLE: SEVERAL DAYS
8. IF YOU CHECKED OFF ANY PROBLEMS, HOW DIFFICULT HAVE THESE MADE IT FOR YOU TO DO YOUR WORK, TAKE CARE OF THINGS AT HOME, OR GET ALONG WITH OTHER PEOPLE?: SOMEWHAT DIFFICULT
7. FEELING AFRAID AS IF SOMETHING AWFUL MIGHT HAPPEN: NOT AT ALL
2. NOT BEING ABLE TO STOP OR CONTROL WORRYING: NOT AT ALL
1. FEELING NERVOUS, ANXIOUS, OR ON EDGE: SEVERAL DAYS
7. FEELING AFRAID AS IF SOMETHING AWFUL MIGHT HAPPEN: NOT AT ALL
4. TROUBLE RELAXING: MORE THAN HALF THE DAYS
3. WORRYING TOO MUCH ABOUT DIFFERENT THINGS: SEVERAL DAYS
GAD7 TOTAL SCORE: 6
GAD7 TOTAL SCORE: 6

## 2021-08-25 NOTE — PROGRESS NOTES
Bess is a 31 year old who is being evaluated via a billable video visit.      How would you like to obtain your AVS? MyChart  If the video visit is dropped, the invitation should be resent by: Text to cell phone: [phone]  Will anyone else be joining your video visit? No      Assessment & Plan       ICD-10-CM    1. Mild major depression (H)  F32.0    2. Mild anxiety  F41.9       MDD- mild worsening depression sx's, with mild baseline anxiety, more in the past 1 1/2 yrs.  Worsened by living on her own during COVID, as an extrovert.  Feels she does fine when busy at work or busy socially, but very frustrated by her lack of motivation/ability to do things when she has down time.  Has tried to work on it on her own, lots of coping skills, but it's just not getting better.  Already does a great job with exercise- does help.  Sleep is okay with melatonin.  Has worked with a therapist, but hasn't really discussed or addressed these issues.  Could try and schedule more things, which she thinks might help her deal better with these down times.   Discussed and offered rx medication txt- like prozac and zoloft.  Discussed risks/benefits/se's, and timing of use, along with potential return of sx's if/when going off if situation doesn't change.  If sx's persisting/worsening, will make office or video appt to discuss sx's, txt options and likely start meds.  Will make sure she has a recent weight if she does a video visit.      28 minutes spent on the date of the encounter doing chart review, review of test results, interpretation of tests, patient visit and documentation     Tati Gruber MD  [facility]   Bess is a 31 year old who presents for the following health issues - worsening/persistent mood/motivation issues    HPI     Answers for HPI/ROS submitted by the patient on 8/25/2021  If you checked off any problems, how difficult have these problems made it for you to do  your work, take care of things at home, or get along with other people?: Somewhat difficult  PHQ9 TOTAL SCORE: 8  LUIS 7 TOTAL SCORE: 6      Abnormal Mood Symptoms  Onset/Duration: many months  Description: see below  Depression (if yes, do PHQ-9): YES  Anxiety (if yes, do LUIS-7): YES  Accompanying Signs & Symptoms:  Still participating in activities that you used to enjoy: YES  Fatigue: no  Irritability: no  Difficulty concentrating: YES  Changes in appetite: no  Problems with sleep: YES  Heart racing/beating fast: no  Abnormally elevated, expansive, or irritable mood: no  Persistently increased activity or energy: no  Thoughts of hurting yourself or others: no  History:  Recent stress or major life event: no  Prior depression or anxiety: mild anxiety  Family history of depression or anxiety: YES  Alcohol/drug use: no  Difficulty sleeping: no - improved lately, melatonin helps  Precipitating or alleviating factors: being more active/social, getting back more to the office for work  Therapies tried and outcome: individual therapy    May be experiencing more mood sx's lately.  Feeling on/off sx's during COVID. Could be COVID, and around that time, she started living alone for the first time.  Also feeling masters.  Felt like she should     Now it's been summer, doing more with friends, more activities, and still has those sx's.  Some issues with friends have partners, less time, so   Has wanted to wait to see if things get better.  Feels like she should be grateful for a free day, and get things done, but wastes the day, and feels sad.  On the busy days, she feels good.  Also feels better if a friend is coming over.  At work, feels good.  Can be pretty focused and self-motivated.  Will be coming into the office more.  During the academic year, it's more structured, busier with work.    She sees a therapist- more as needed.  Does a lot of self-processing on her own as well.    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  Sleep- getting better.  Also may be an issue.  Schedule has shifted- getting to bed later, and getting up later.  Able to stay asleep.  Was hard to get to sleep, but better with the melatonin supplements (5mg- most nights).  Unsiom gave am grogginess if she doesn't take it early enough.    Has been having issues with anxiety in the past.    More issues with 'little pleasure in doing things'.      Usually an extrovert, and likes seeing people and doing things.  Usually doesn't like watching tv.    Tends to get this sense/feeling - mixture of lonliness and a feeling of being down, and unmotivated to do anything else, things around her apartment.  Anytime that she has that downtime, if she's not getting ready for an activity, she feels unmotivated to do something.    Working from home and office now.  Really hard to sit and focus on things, unless she has a deadline coming pu.  If things are slow, it's hard for her to work on tings, and feels pretty restless. And can't get herself to do something else.    Wt at gym recently- ~118 lbs.  Was at 114 lbs- more time to work out, more walking with going back to the office now.    Finished her masters.      PHQ 1/22/2020   PHQ-9 Total Score 2   Q9: Thoughts of better off dead/self-harm past 2 weeks Not at all     LUIS-7 SCORE 1/22/2020   Total Score 6       Review of Systems   Constitutional, HEENT, cardiovascular, pulmonary, gi and gu systems are negative, except as otherwise noted.      Objective     Vitals:  No vitals were obtained today due to virtual visit.    Physical Exam   GENERAL: Healthy, alert and no distress  EYES: Eyes grossly normal to inspection.  No discharge or erythema, or obvious scleral/conjunctival abnormalities.  RESP: No audible wheeze, cough, or visible cyanosis.  No visible retractions or increased work of breathing.    SKIN: Visible skin clear. No significant rash, abnormal pigmentation or lesions.  NEURO: Cranial nerves grossly intact.   Mentation and speech appropriate for age.  PSYCH: Mentation appears normal, affect normal/bright, judgement and insight intact, normal speech and appearance well-groomed.            Video-Visit Details    Type of service:  Video Visit    Video End Time:12:38 PM (12:15 PM start time)    Originating Location (pt. Location): Home    Distant Location (provider location):  [facility]     Platform used for Video Visit: NEXAGE

## 2021-08-26 ASSESSMENT — ANXIETY QUESTIONNAIRES: GAD7 TOTAL SCORE: 6

## 2021-09-21 ENCOUNTER — MYC MEDICAL ADVICE (OUTPATIENT)
Dept: FAMILY MEDICINE | Facility: CLINIC | Age: 31
End: 2021-09-21

## 2021-10-09 ENCOUNTER — HEALTH MAINTENANCE LETTER (OUTPATIENT)
Age: 31
End: 2021-10-09

## 2021-12-13 DIAGNOSIS — Z79.899 HIGH RISK MEDICATION USE: ICD-10-CM

## 2021-12-13 DIAGNOSIS — Z79.899 ENCOUNTER FOR EYE EXAM DUE TO HIGH RISK MEDICATION: Primary | ICD-10-CM

## 2021-12-16 ENCOUNTER — MYC MEDICAL ADVICE (OUTPATIENT)
Dept: FAMILY MEDICINE | Facility: CLINIC | Age: 31
End: 2021-12-16
Payer: COMMERCIAL

## 2021-12-16 DIAGNOSIS — N76.0 ABSCESS OF VAGINA: Primary | ICD-10-CM

## 2021-12-17 NOTE — TELEPHONE ENCOUNTER
If she needs drainage, it would be best to be done by ob/gyn, so I did go ahead and place a referral.  Hopefully the area will continue to improve slowly on its own, though.  Would finish the abx and cont hot packs/baths in the meantime.  Thanks! CW

## 2021-12-20 ENCOUNTER — MYC MEDICAL ADVICE (OUTPATIENT)
Dept: FAMILY MEDICINE | Facility: CLINIC | Age: 31
End: 2021-12-20
Payer: COMMERCIAL

## 2021-12-22 ENCOUNTER — OFFICE VISIT (OUTPATIENT)
Dept: OPHTHALMOLOGY | Facility: CLINIC | Age: 31
End: 2021-12-22
Attending: OPTOMETRIST
Payer: COMMERCIAL

## 2021-12-22 DIAGNOSIS — H52.13 MYOPIA OF BOTH EYES: ICD-10-CM

## 2021-12-22 DIAGNOSIS — H04.123 DRY EYE SYNDROME OF BOTH EYES: ICD-10-CM

## 2021-12-22 DIAGNOSIS — Z79.899 HIGH RISK MEDICATION USE: ICD-10-CM

## 2021-12-22 DIAGNOSIS — Z98.890 HX OF LASIK: Primary | ICD-10-CM

## 2021-12-22 DIAGNOSIS — Z79.899 ENCOUNTER FOR EYE EXAM DUE TO HIGH RISK MEDICATION: ICD-10-CM

## 2021-12-22 PROCEDURE — G0463 HOSPITAL OUTPT CLINIC VISIT: HCPCS

## 2021-12-22 PROCEDURE — 92133 CPTRZD OPH DX IMG PST SGM ON: CPT | Performed by: OPTOMETRIST

## 2021-12-22 PROCEDURE — 92004 COMPRE OPH EXAM NEW PT 1/>: CPT | Performed by: OPTOMETRIST

## 2021-12-22 PROCEDURE — 92083 EXTENDED VISUAL FIELD XM: CPT | Performed by: OPTOMETRIST

## 2021-12-22 PROCEDURE — 92015 DETERMINE REFRACTIVE STATE: CPT

## 2021-12-22 RX ORDER — SULFAMETHOXAZOLE/TRIMETHOPRIM 800-160 MG
TABLET ORAL
COMMUNITY
Start: 2021-12-12 | End: 2022-05-04

## 2021-12-22 ASSESSMENT — EXTERNAL EXAM - RIGHT EYE: OD_EXAM: NORMAL

## 2021-12-22 ASSESSMENT — SLIT LAMP EXAM - LIDS
COMMENTS: NORMAL
COMMENTS: NORMAL

## 2021-12-22 ASSESSMENT — CONF VISUAL FIELD
METHOD: COUNTING FINGERS
OD_NORMAL: 1
OS_NORMAL: 1

## 2021-12-22 ASSESSMENT — VISUAL ACUITY
OS_SC: 20/30
OS_PH_SC: 20/20
OS_PH_SC+: -2
METHOD: SNELLEN - LINEAR
OD_SC: 20/40
OD_PH_SC: 20/20

## 2021-12-22 ASSESSMENT — CUP TO DISC RATIO
OD_RATIO: 0.15
OS_RATIO: 0.2

## 2021-12-22 ASSESSMENT — TONOMETRY
OS_IOP_MMHG: 15
IOP_METHOD: ICARE
OD_IOP_MMHG: 15

## 2021-12-22 ASSESSMENT — REFRACTION_MANIFEST
OS_CYLINDER: SPHERE
OD_SPHERE: -1.25
OS_SPHERE: -1.00
OD_CYLINDER: +0.50
OD_AXIS: 160

## 2021-12-22 ASSESSMENT — EXTERNAL EXAM - LEFT EYE: OS_EXAM: NORMAL

## 2021-12-27 ENCOUNTER — TELEPHONE (OUTPATIENT)
Dept: OPHTHALMOLOGY | Facility: CLINIC | Age: 31
End: 2021-12-27
Payer: COMMERCIAL

## 2021-12-27 NOTE — TELEPHONE ENCOUNTER
[facility] Call Center    Phone Message    May a detailed message be left on voicemail: yes     Reason for Call: Other:   Pt calling to inquire about the pupillary distance measurement. Was this taken and if so, what are the numbers? Please follow-up.     Action Taken: Other:  eye    Travel Screening: Not Applicable

## 2022-03-01 PROBLEM — R87.610 ASCUS WITH POSITIVE HIGH RISK HPV CERVICAL: Status: ACTIVE | Noted: 2018-03-06

## 2022-03-01 PROBLEM — R87.810 ASCUS WITH POSITIVE HIGH RISK HPV CERVICAL: Status: ACTIVE | Noted: 2018-03-06

## 2022-03-28 ENCOUNTER — TRANSFERRED RECORDS (OUTPATIENT)
Dept: HEALTH INFORMATION MANAGEMENT | Facility: CLINIC | Age: 32
End: 2022-03-28
Payer: COMMERCIAL

## 2022-03-28 LAB
CREATININE (EXTERNAL): 0.84 MG/DL (ref 0.57–1)
GFR ESTIMATED (EXTERNAL): 96 ML/MIN/1.73
GLUCOSE (EXTERNAL): 71 MG/DL (ref 65–99)
POTASSIUM (EXTERNAL): 4.6 MMOL/L (ref 3.5–5.2)
TSH SERPL-ACNC: 1.06 ULU/ML (ref 0.45–4.5)

## 2022-03-31 DIAGNOSIS — R79.89 ABNORMAL CBC: Primary | ICD-10-CM

## 2022-05-02 NOTE — PROGRESS NOTES
SUBJECTIVE:   CC: Bess Ellis is an 32 year old woman who presents for preventive health visit.     Patient has been advised of split billing requirements and indicates understanding: Yes  Healthy Habits:     Getting at least 3 servings of Calcium per day:  Yes    Bi-annual eye exam:  Yes    Dental care twice a year:  Yes    Sleep apnea or symptoms of sleep apnea:  Daytime drowsiness    Diet:  Other    Frequency of exercise:  6-7 days/week    Duration of exercise:  45-60 minutes    Taking medications regularly:  Yes    Medication side effects:  Not applicable    PHQ-2 Total Score: 0    Additional concerns today:  No    Answers for HPI/ROS submitted by the patient on 5/4/2022  If you checked off any problems, how difficult have these problems made it for you to do your work, take care of things at home, or get along with other people?: Not difficult at all  PHQ9 TOTAL SCORE: 1    Patient would like IUD examined. 3 Days ago, she had extremely bad cramps and would like to know if it was placed properly. Also discuss about Gi problem and have Biometric screening form filled.  -------    Pelvic cramping-   This past Sun (~4 days ago), she woke up with about 10 minutes of horrible pelvic cramping.  So bad she couldn't move, thought she'd need to go in, but then it went away. 9/10 pain, seemed like labor. Went away, hasn't come back other than very mild crampy sensation for the last couple days.      Her usual cramping sx's are waves of milder cramps for a day or two.  No spotting or bleeding with these sx's.    Did have IUD removed/replaced in 11/21- now has Chris, which went well, really easy, just moments of discomfort during procedure.      Went in for labial cyst-like sx's in 12/21, saw ob/gyn, who couldn't see IUD strings, so they did an office ultrasound- and pt notes they said the said her IUD was in the correct place.  No other concerns on the ultrasound.    Fine from then until Sun in terms of cramps, and  no spotting/bleeding.  Last [place]- 1 1/2 months ago.    GI issues-   Did see PA at [facility], Darline Pelletier, follow-up at the end of May, CBC today.  In the fall, random bouts of gas.  Really hit in Jan, was having constipation, farting all day long, horrible smelling.    Literally lasted 2 months, tried to adjust her diet.  Food sensitivity test, muscle response- dairy/soy flagged.  Switched from vegetarian diet, now avoiding dairy and soy.  Sx's better, but not gone.  Now 1d//wk, constipation and gassy the next day.  Straining x 1 hr at times.  Sometimes pain from that.    GI sx's- saw [facility] given options - colonoscopy, stool test, colon cleanse.  Had already stopped dairy/soy which helped lessen bloating, so wanted to hold off on scope.  Stool tests weren't covered, so didn't do those.  She was going to do the colon cleanse, but she was mostly having daily stools.  If she needs, she does a miralax (she had rec daily miralax)- she does it prn.  She also started mag citrate- taking 300mg.  On bottle, says she can take 600mg- wondering if okay?    Mood- follow-up 8/21 appt- mood was worse then, now better.  Had decided to hold off on meds at the time.  Pt notes she had been really lonely with, which was affecting her mood.  Met more work people, getting out more, which really helped. Also working more on coping skills.  Does have a therapist- meets prn.        Today's PHQ-2 Score:   PHQ-2 ( 1999 [employer]) 5/4/2022   Q1: Little interest or pleasure in doing things 0   Q2: Feeling down, depressed or hopeless 0   PHQ-2 Score 0   PHQ-2 Total Score (12-17 Years)- Positive if 3 or more points; Administer PHQ-A if positive -   Q1: Little interest or pleasure in doing things Not at all   Q2: Feeling down, depressed or hopeless Not at all   PHQ-2 Score 0       Abuse: Current or Past (Physical, Sexual or Emotional) - No  Do you feel safe in your environment? Yes        Social History     Tobacco Use     Smoking status: Never  Smoker     Smokeless tobacco: Never Used   Substance Use Topics     Alcohol use: Yes     Comment: 1-2 times a week couple drinks      If you drink alcohol do you typically have >3 drinks per day or >7 drinks per week? No    Alcohol Use 2022   Prescreen: >3 drinks/day or >7 drinks/week? No   Prescreen: >3 drinks/day or >7 drinks/week? -   No flowsheet data found.    Reviewed orders with patient.  Reviewed health maintenance and updated orders accordingly - Yes      Lab work is in process  Labs reviewed in EPIC  BP Readings from Last 3 Encounters:   22 97/61   21 109/71   20 113/71    Wt Readings from Last 3 Encounters:   22 51.7 kg (114 lb)   21 52.6 kg (116 lb)   20 52.6 kg (116 lb)                  Patient Active Problem List   Diagnosis     IUD contraception     Other irritable bowel syndrome     ASCUS with positive high risk HPV cervical     Acne vulgaris     Dysmenorrhea     Past Surgical History:   Procedure Laterality Date     BIOPSY OF SKIN LESION  2017    atypical nevus, benign     LASIK  2013       Social History     Tobacco Use     Smoking status: Never Smoker     Smokeless tobacco: Never Used   Substance Use Topics     Alcohol use: Yes     Comment: 1-2 times a week couple drinks      Family History   Problem Relation Age of Onset     Cancer Mother 57        CLL, donig well, no txt     Gastrointestinal Disease Mother         IBS     Other Cancer Mother         CLL     Gastrointestinal Disease Sister         IBS     Depression Sister      Anxiety Disorder Sister      Cancer Maternal Grandmother 65        CML, had txt,  at 90, unsure cause of death     Coronary Artery Disease Maternal Grandfather         later in life     Colon Cancer Paternal Grandmother         60s     Coronary Artery Disease Paternal Grandfather         later in life     Colon Cancer Paternal Grandfather         60s     Hypertension Father      Cerebrovascular Disease Father      Anxiety  Disorder Father      Glaucoma No family hx of      Macular Degeneration No family hx of          Current Outpatient Medications   Medication Sig Dispense Refill     levonorgestrel (MIRENA) 20 MCG/DAY IUD 1 each by Intrauterine route once (Liletta IUD - placed at [facility] in 11/21)       magnesium 250 MG tablet Take 1 tablet by mouth daily       polyethylene glycol (MIRALAX) 17 g packet Take 1 packet by mouth daily as needed       Biotin 1 MG CAPS        Cholecalciferol (VITAMIN D-3 PO) Take 1 capsule by mouth daily       clindamycin (CLINDAMAX) 1 % external gel Apply 1 Application topically as needed       Cyanocobalamin (B-12 PO) Take 1 tablet by mouth daily       hydrocortisone 2.5 % cream Apply 1 Application topically as needed       Omega-3 Fatty Acids (FISH OIL) 1200 MG capsule Take 1,200 mg by mouth daily       Probiotic Product (PROBIOTIC DAILY PO)        spironolactone (ALDACTONE) 50 MG tablet Take 50 mg by mouth daily Through dermatology       tretinoin (RETIN-A) 0.01 % external gel Apply topically At Bedtime       Allergies   Allergen Reactions     Seasonal Allergies      Recent Labs   Lab Test 05/04/22  0925 03/19/21  0853   A1C 4.5 4.5   LDL  --  67   HDL  --  59   TRIG  --  58   CR  --  0.78   GFRESTIMATED  --  >90   GFRESTBLACK  --  >90   POTASSIUM  --  3.7   TSH  --  1.88        Breast Cancer Screening:    Breast CA Risk Assessment (FHS-7) 3/18/2021   Do you have a family history of breast, colon, or ovarian cancer? No / Unknown         Patient under 40 years of age: Routine Mammogram Screening not recommended.   Pertinent mammograms are reviewed under the imaging tab.    History of abnormal Pap smear: YES - updated in Problem List and Health Maintenance accordingly  PAP / HPV Latest Ref Rng & Units 3/19/2021 3/11/2020 3/5/2019   PAP (Historical) - NIL NIL NIL   HPV16 NEG:Negative Negative Negative Negative   HPV18 NEG:Negative Negative Negative Negative   HRHPV NEG:Negative  "Positive(A) Positive(A) Positive(A)     Reviewed and updated as needed this visit by clinical staff   Tobacco  Allergies  Meds  Problems  Med Hx  Surg Hx  Fam Hx            Reviewed and updated as needed this visit by Provider   Tobacco  Allergies  Meds  Problems  Med Hx  Surg Hx  Fam Hx               Review of Systems   Constitutional: Negative for chills and fever.   HENT: Negative for congestion, ear pain, hearing loss and sore throat.    Eyes: Negative for pain and visual disturbance.   Respiratory: Negative for cough and shortness of breath.    Cardiovascular: Negative for chest pain, palpitations and peripheral edema.   Gastrointestinal: Positive for abdominal pain and constipation. Negative for diarrhea, heartburn, hematochezia and nausea.   Breasts:  Negative for tenderness, breast mass and discharge.   Genitourinary: Positive for pelvic pain. Negative for dysuria, frequency, genital sores, hematuria, urgency, vaginal bleeding and vaginal discharge.   Musculoskeletal: Negative for arthralgias, joint swelling and myalgias.   Skin: Negative for rash.   Neurological: Negative for dizziness, weakness, headaches and paresthesias.   Psychiatric/Behavioral: Negative for mood changes. The patient is nervous/anxious.         OBJECTIVE:   BP 97/61   Pulse 71   Temp 98.9  F (37.2  C) (Temporal)   Ht 1.535 m (5' 0.43\")   Wt 51.7 kg (114 lb)   SpO2 99%   BMI 21.95 kg/m    Physical Exam  GENERAL: healthy, alert and no distress  EYES: Eyes grossly normal to inspection, PERRL and conjunctivae and sclerae normal  HENT: ear canals and TM's normal, nose and mouth without ulcers or lesions  NECK: no adenopathy, no asymmetry, masses, or scars and thyroid normal to palpation  RESP: lungs clear to auscultation - no rales, rhonchi or wheezes  BREAST: normal without masses, tenderness or nipple discharge and no palpable axillary masses or adenopathy  CV: regular rate and rhythm, normal S1 S2, no S3 or S4, no " murmur, click or rub, no peripheral edema and peripheral pulses strong  ABDOMEN: soft, nontender, no hepatosplenomegaly, no masses and bowel sounds normal   (female): normal female external genitalia, normal urethral meatus, vaginal mucosa pink, moist, well rugated, and normal cervix/adnexa/uterus without masses or discharge  MS: no gross musculoskeletal defects noted, no edema  SKIN: no suspicious lesions or rashes  NEURO: Normal strength and tone, mentation intact and speech normal  PSYCH: mentation appears normal, affect normal/bright    Diagnostic Test Results:  Labs reviewed in Epic  Results for orders placed or performed in visit on 05/04/22   Lipid panel reflex to direct LDL Fasting     Status: None   Result Value Ref Range    Cholesterol 131 <200 mg/dL    Triglycerides 46 <150 mg/dL    Direct Measure HDL 58 >=50 mg/dL    LDL Cholesterol Calculated 64 <=100 mg/dL    Non HDL Cholesterol 73 <130 mg/dL    Patient Fasting > 8hrs? Yes     Narrative    Cholesterol  Desirable:  <200 mg/dL    Triglycerides  Normal:  Less than 150 mg/dL  Borderline High:  150-199 mg/dL  High:  200-499 mg/dL  Very High:  Greater than or equal to 500 mg/dL    Direct Measure HDL  Female:  Greater than or equal to 50 mg/dL   Male:  Greater than or equal to 40 mg/dL    LDL Cholesterol  Desirable:  <100mg/dL  Above Desirable:  100-129 mg/dL   Borderline High:  130-159 mg/dL   High:  160-189 mg/dL   Very High:  >= 190 mg/dL    Non HDL Cholesterol  Desirable:  130 mg/dL  Above Desirable:  130-159 mg/dL  Borderline High:  160-189 mg/dL  High:  190-219 mg/dL  Very High:  Greater than or equal to 220 mg/dL   Hemoglobin A1c     Status: Normal   Result Value Ref Range    Hemoglobin A1C 4.5 0.0 - 5.6 %   HIV Antigen Antibody Combo     Status: Normal   Result Value Ref Range    HIV Antigen Antibody Combo Nonreactive Nonreactive   Treponema Abs w Reflex to RPR and Titer     Status: Normal   Result Value Ref Range    Treponema Antibody Total  Nonreactive Nonreactive   Basic metabolic panel  (Ca, Cl, CO2, Creat, Gluc, K, Na, BUN)     Status: Normal   Result Value Ref Range    Sodium 138 133 - 144 mmol/L    Potassium 4.2 3.4 - 5.3 mmol/L    Chloride 105 94 - 109 mmol/L    Carbon Dioxide (CO2) 30 20 - 32 mmol/L    Anion Gap 3 3 - 14 mmol/L    Urea Nitrogen 13 7 - 30 mg/dL    Creatinine 0.82 0.52 - 1.04 mg/dL    Calcium 9.1 8.5 - 10.1 mg/dL    Glucose 76 70 - 99 mg/dL    GFR Estimate >90 >60 mL/min/1.73m2   Magnesium     Status: Abnormal   Result Value Ref Range    Magnesium 2.4 (H) 1.6 - 2.3 mg/dL   CBC with platelets and differential     Status: Abnormal   Result Value Ref Range    WBC Count 3.8 (L) 4.0 - 11.0 10e3/uL    RBC Count 4.71 3.80 - 5.20 10e6/uL    Hemoglobin 15.6 11.7 - 15.7 g/dL    Hematocrit 44.3 35.0 - 47.0 %    MCV 94 78 - 100 fL    MCH 33.1 (H) 26.5 - 33.0 pg    MCHC 35.2 31.5 - 36.5 g/dL    RDW 12.0 10.0 - 15.0 %    Platelet Count 186 150 - 450 10e3/uL    % Neutrophils 57 %    % Lymphocytes 33 %    % Monocytes 9 %    % Eosinophils 2 %    % Basophils 0 %    Absolute Neutrophils 2.1 1.6 - 8.3 10e3/uL    Absolute Lymphocytes 1.2 0.8 - 5.3 10e3/uL    Absolute Monocytes 0.3 0.0 - 1.3 10e3/uL    Absolute Eosinophils 0.1 0.0 - 0.7 10e3/uL    Absolute Basophils 0.0 0.0 - 0.2 10e3/uL   HPV High Risk Types DNA Cervical     Status: Abnormal   Result Value Ref Range    Other HR HPV Positive (A) Negative    HPV16 DNA Negative Negative    HPV18 DNA Negative Negative    FINAL DIAGNOSIS       This patient's sample is positive for other HR HPV DNA (types 31, 33, 35, 39, 45, 51, 52, 56, 58, 59, 66 or 68), not HPV 16 or HPV 18 DNA. This result requires clinical correlation with concurrent cytology findings.      This test was developed and its performance characteristics determined by the [facility]. It has not been cleared or approved by the FDA. The laboratory is regulated under CLIA as  qualified to perform high-complexity testing. This test is used for clinical purposes. It should not be regarded as investigational or for research.    METHODOLOGY: The Roche Huma 4800 system uses automated extraction, simultaneous amplification of HPV (L1 region) and beta-globin, followed by real time detection of fluorescent labeled HPV and beta globin using specific oligonucleotide probes. The test specifically identified types HPV 16 DNA and HPV 18 DNA while concurrently detecting the rest of the high risk types (31, 33, 35, 39, 45, 51, 52, 56, 58, 59, 66 or 68).    COMMENTS: This test is not intended for use as a screening device for woman under age 30 with normal cervical cytology. Results should be correlated with cytologic and histologic findings. Close clinical followup is recommended.       Pap Screen with HPV - recommended age 30 - 65 years     Status: None   Result Value Ref Range    Interpretation        Negative for Intraepithelial Lesion or Malignancy (NILM)    Comment         Papanicolaou Test Limitations:  Cervical cytology is a screening test with limited sensitivity, and regular screening is critical for cancer prevention.  Pap tests are primarily effective for the diagnosis/prevention of squamous cell carcinoma, not adenocarcinoma or other cancers.        Specimen Adequacy       Satisfactory for evaluation, endocervical/transformation zone component present    Clinical Information       IUD      LMP/Menopause Date       [n/a, no bleeding on IUD      Reflex Testing Yes regardless of result     Previous Abnormal?       Yes      Previous Abnormal Diagnosis       see problem list notes, pos HPV (not 16/18)      Performing Labs       The technical component of this testing was completed at [facility]     NEISSERIA GONORRHOEA PCR     Status: Normal    Specimen: Vagina; Swab   Result Value Ref Range    Neisseria gonorrhoeae Negative Negative    CHLAMYDIA TRACHOMATIS PCR     Status: Normal    Specimen: Vagina; Swab   Result Value Ref Range    Chlamydia trachomatis Negative Negative   CBC with Platelets & Differential     Status: Abnormal    Narrative    The following orders were created for panel order CBC with Platelets & Differential.  Procedure                               Abnormality         Status                     ---------                               -----------         ------                     CBC with platelets and d...[[id number]]  Abnormal            Final result                 Please view results for these tests on the individual orders.       ASSESSMENT/PLAN:       ICD-10-CM    1. Routine general medical examination at a health care facility  Z00.00 Lipid panel reflex to direct LDL Fasting     Hemoglobin A1c     Lipid panel reflex to direct LDL Fasting     Hemoglobin A1c   2. Constipation, unspecified constipation type  K59.00 Magnesium     Magnesium   3. Bloating  R14.0    4. Pelvic cramping  R10.2    5. IUD contraception  Z97.5    6. Acne vulgaris  L70.0 Basic metabolic panel  (Ca, Cl, CO2, Creat, Gluc, K, Na, BUN)     Basic metabolic panel  (Ca, Cl, CO2, Creat, Gluc, K, Na, BUN)   7. Cervical cancer screening  Z12.4 Pap Screen with HPV - recommended age 30 - 65 years   8. Screen for STD (sexually transmitted disease)  Z11.3 NEISSERIA GONORRHOEA PCR     CHLAMYDIA TRACHOMATIS PCR     HIV Antigen Antibody Combo     Treponema Abs w Reflex to RPR and Titer     HIV Antigen Antibody Combo     Treponema Abs w Reflex to RPR and Titer   9. Abnormal CBC  R79.89 CBC with Platelets & Differential     CPE- We discussed ways to maintain a healthy lifestyle with sensible eating, regular exercise and self cares. We dicussed calcium and Vitamin D intake, vaccinations and preventive health screens.  See orders above for tests ordered and screening needed.  Thin prep pap was done. No immunizations needed today.     Needs biometric labs for work- will  "fax over when labs return.  Will also check BMP with spironolactone use through derm for acne, STD screen, and magnesium as below.    Constipation/bloating-   Sx's bad for months, did testing at [facility], noted soy/dairy allergy so cut those out, which helped lessen bloating sx's but didn't resolve sx's.  Saw PA at [facility], who rec colnoscopy (pt declined), stool studies (not covered), stool cleanse (pt didn't think needed due to daily stools).  She has continued avoiding soy/dairy.  Rec nightly magnesium- just started a few nights ago- could increase dose if magnesium level today is not above normal.  Also rec increased miralax to get stools daily, curvy, without straining.  Has GI follow-up coming up as well. Needs CBC from them drawn today.    Pelvic cramping- 9/10 pelvic cramping sx's for ~10 minutes a few days ago, then with very mild cramping since.  IUD strings right at opening of cervical os, just seen and felt today.  Sounds similar to strings at ob/gyn office visit in 12/21 when ultrasound showed IUD in good position per pt.  Bimanual exam with minimal to no pain today which is reassuring.  Doubt IUD issue, would call/rtc if sx's return.       Patient has been advised of split billing requirements and indicates understanding: Yes    COUNSELING:  Reviewed preventive health counseling, as reflected in patient instructions    Estimated body mass index is 21.95 kg/m  as calculated from the following:    Height as of this encounter: 1.535 m (5' 0.43\").    Weight as of this encounter: 51.7 kg (114 lb).        She reports that she has never smoked. She has never used smokeless tobacco.      Counseling Resources:  ATP IV Guidelines  Pooled Cohorts Equation Calculator  Breast Cancer Risk Calculator  BRCA-Related Cancer Risk Assessment: FHS-7 Tool  FRAX Risk Assessment  ICSI Preventive Guidelines  Dietary Guidelines for Americans, 2010  USDA's MyPlate  ASA Prophylaxis  Lung CA Screening    Tati Gruber MD  [facility] " [facility]

## 2022-05-04 ENCOUNTER — OFFICE VISIT (OUTPATIENT)
Dept: FAMILY MEDICINE | Facility: CLINIC | Age: 32
End: 2022-05-04
Payer: COMMERCIAL

## 2022-05-04 VITALS
HEART RATE: 71 BPM | SYSTOLIC BLOOD PRESSURE: 97 MMHG | WEIGHT: 114 LBS | DIASTOLIC BLOOD PRESSURE: 61 MMHG | TEMPERATURE: 98.9 F | BODY MASS INDEX: 22.38 KG/M2 | HEIGHT: 60 IN | OXYGEN SATURATION: 99 %

## 2022-05-04 DIAGNOSIS — Z11.3 SCREEN FOR STD (SEXUALLY TRANSMITTED DISEASE): ICD-10-CM

## 2022-05-04 DIAGNOSIS — Z00.00 ROUTINE GENERAL MEDICAL EXAMINATION AT A HEALTH CARE FACILITY: Primary | ICD-10-CM

## 2022-05-04 DIAGNOSIS — R10.2 PELVIC CRAMPING: ICD-10-CM

## 2022-05-04 DIAGNOSIS — Z12.4 CERVICAL CANCER SCREENING: ICD-10-CM

## 2022-05-04 DIAGNOSIS — L70.0 ACNE VULGARIS: ICD-10-CM

## 2022-05-04 DIAGNOSIS — Z97.5 IUD CONTRACEPTION: ICD-10-CM

## 2022-05-04 DIAGNOSIS — R14.0 BLOATING: ICD-10-CM

## 2022-05-04 DIAGNOSIS — K59.00 CONSTIPATION, UNSPECIFIED CONSTIPATION TYPE: ICD-10-CM

## 2022-05-04 DIAGNOSIS — R79.89 ABNORMAL CBC: ICD-10-CM

## 2022-05-04 LAB
ANION GAP SERPL CALCULATED.3IONS-SCNC: 3 MMOL/L (ref 3–14)
BASOPHILS # BLD AUTO: 0 10E3/UL (ref 0–0.2)
BASOPHILS NFR BLD AUTO: 0 %
BUN SERPL-MCNC: 13 MG/DL (ref 7–30)
CALCIUM SERPL-MCNC: 9.1 MG/DL (ref 8.5–10.1)
CHLORIDE BLD-SCNC: 105 MMOL/L (ref 94–109)
CHOLEST SERPL-MCNC: 131 MG/DL
CO2 SERPL-SCNC: 30 MMOL/L (ref 20–32)
CREAT SERPL-MCNC: 0.82 MG/DL (ref 0.52–1.04)
EOSINOPHIL # BLD AUTO: 0.1 10E3/UL (ref 0–0.7)
EOSINOPHIL NFR BLD AUTO: 2 %
ERYTHROCYTE [DISTWIDTH] IN BLOOD BY AUTOMATED COUNT: 12 % (ref 10–15)
FASTING STATUS PATIENT QL REPORTED: YES
GFR SERPL CREATININE-BSD FRML MDRD: >90 ML/MIN/1.73M2
GLUCOSE BLD-MCNC: 76 MG/DL (ref 70–99)
HBA1C MFR BLD: 4.5 % (ref 0–5.6)
HCT VFR BLD AUTO: 44.3 % (ref 35–47)
HDLC SERPL-MCNC: 58 MG/DL
HGB BLD-MCNC: 15.6 G/DL (ref 11.7–15.7)
HIV 1+2 AB+HIV1 P24 AG SERPL QL IA: NONREACTIVE
LDLC SERPL CALC-MCNC: 64 MG/DL
LYMPHOCYTES # BLD AUTO: 1.2 10E3/UL (ref 0.8–5.3)
LYMPHOCYTES NFR BLD AUTO: 33 %
MAGNESIUM SERPL-MCNC: 2.4 MG/DL (ref 1.6–2.3)
MCH RBC QN AUTO: 33.1 PG (ref 26.5–33)
MCHC RBC AUTO-ENTMCNC: 35.2 G/DL (ref 31.5–36.5)
MCV RBC AUTO: 94 FL (ref 78–100)
MONOCYTES # BLD AUTO: 0.3 10E3/UL (ref 0–1.3)
MONOCYTES NFR BLD AUTO: 9 %
NEUTROPHILS # BLD AUTO: 2.1 10E3/UL (ref 1.6–8.3)
NEUTROPHILS NFR BLD AUTO: 57 %
NONHDLC SERPL-MCNC: 73 MG/DL
PLATELET # BLD AUTO: 186 10E3/UL (ref 150–450)
POTASSIUM BLD-SCNC: 4.2 MMOL/L (ref 3.4–5.3)
RBC # BLD AUTO: 4.71 10E6/UL (ref 3.8–5.2)
SODIUM SERPL-SCNC: 138 MMOL/L (ref 133–144)
T PALLIDUM AB SER QL: NONREACTIVE
TRIGL SERPL-MCNC: 46 MG/DL
WBC # BLD AUTO: 3.8 10E3/UL (ref 4–11)

## 2022-05-04 PROCEDURE — 83036 HEMOGLOBIN GLYCOSYLATED A1C: CPT | Performed by: FAMILY MEDICINE

## 2022-05-04 PROCEDURE — 87591 N.GONORRHOEAE DNA AMP PROB: CPT | Performed by: FAMILY MEDICINE

## 2022-05-04 PROCEDURE — 99395 PREV VISIT EST AGE 18-39: CPT | Performed by: FAMILY MEDICINE

## 2022-05-04 PROCEDURE — 87389 HIV-1 AG W/HIV-1&-2 AB AG IA: CPT | Performed by: FAMILY MEDICINE

## 2022-05-04 PROCEDURE — 36415 COLL VENOUS BLD VENIPUNCTURE: CPT | Performed by: FAMILY MEDICINE

## 2022-05-04 PROCEDURE — G0145 SCR C/V CYTO,THINLAYER,RESCR: HCPCS | Performed by: FAMILY MEDICINE

## 2022-05-04 PROCEDURE — 86780 TREPONEMA PALLIDUM: CPT | Performed by: FAMILY MEDICINE

## 2022-05-04 PROCEDURE — 83735 ASSAY OF MAGNESIUM: CPT | Performed by: FAMILY MEDICINE

## 2022-05-04 PROCEDURE — 80061 LIPID PANEL: CPT | Performed by: FAMILY MEDICINE

## 2022-05-04 PROCEDURE — 87491 CHLMYD TRACH DNA AMP PROBE: CPT | Performed by: FAMILY MEDICINE

## 2022-05-04 PROCEDURE — 87624 HPV HI-RISK TYP POOLED RSLT: CPT | Performed by: FAMILY MEDICINE

## 2022-05-04 PROCEDURE — 85025 COMPLETE CBC W/AUTO DIFF WBC: CPT | Performed by: FAMILY MEDICINE

## 2022-05-04 PROCEDURE — 80048 BASIC METABOLIC PNL TOTAL CA: CPT | Performed by: FAMILY MEDICINE

## 2022-05-04 PROCEDURE — 99214 OFFICE O/P EST MOD 30 MIN: CPT | Mod: 25 | Performed by: FAMILY MEDICINE

## 2022-05-04 RX ORDER — POLYETHYLENE GLYCOL 3350 17 G/17G
1 POWDER, FOR SOLUTION ORAL DAILY PRN
COMMUNITY
End: 2023-05-24

## 2022-05-04 RX ORDER — MAGNESIUM OXIDE/MAG AA CHELATE 300 MG
1 CAPSULE ORAL DAILY
COMMUNITY

## 2022-05-04 ASSESSMENT — ENCOUNTER SYMPTOMS
FREQUENCY: 0
HEARTBURN: 0
ABDOMINAL PAIN: 1
EYE PAIN: 0
HEMATURIA: 0
DIZZINESS: 0
HEADACHES: 0
JOINT SWELLING: 0
SHORTNESS OF BREATH: 0
DYSURIA: 0
CHILLS: 0
COUGH: 0
MYALGIAS: 0
NAUSEA: 0
DIARRHEA: 0
SORE THROAT: 0
ARTHRALGIAS: 0
WEAKNESS: 0
BREAST MASS: 0
HEMATOCHEZIA: 0
PARESTHESIAS: 0
FEVER: 0
NERVOUS/ANXIOUS: 1
CONSTIPATION: 1
PALPITATIONS: 0

## 2022-05-04 ASSESSMENT — PATIENT HEALTH QUESTIONNAIRE - PHQ9
10. IF YOU CHECKED OFF ANY PROBLEMS, HOW DIFFICULT HAVE THESE PROBLEMS MADE IT FOR YOU TO DO YOUR WORK, TAKE CARE OF THINGS AT HOME, OR GET ALONG WITH OTHER PEOPLE: NOT DIFFICULT AT ALL
SUM OF ALL RESPONSES TO PHQ QUESTIONS 1-9: 1
SUM OF ALL RESPONSES TO PHQ QUESTIONS 1-9: 1

## 2022-05-05 LAB
C TRACH DNA SPEC QL NAA+PROBE: NEGATIVE
N GONORRHOEA DNA SPEC QL NAA+PROBE: NEGATIVE

## 2022-05-06 LAB
BKR LAB AP GYN ADEQUACY: NORMAL
BKR LAB AP GYN INTERPRETATION: NORMAL
BKR LAB AP HPV REFLEX: NORMAL
BKR LAB AP LMP: NORMAL
BKR LAB AP PREVIOUS ABNL DX: NORMAL
BKR LAB AP PREVIOUS ABNORMAL: NORMAL
PATH REPORT.COMMENTS IMP SPEC: NORMAL
PATH REPORT.COMMENTS IMP SPEC: NORMAL
PATH REPORT.RELEVANT HX SPEC: NORMAL

## 2022-05-07 NOTE — RESULT ENCOUNTER NOTE
-Your STD labs (gonorrhea, chlamydia, HIV and syphilis) are all negative.  -Your basic metabolic panel (which includes electrolyte levels, blood sugar level and kidney function tests) looks good.  -Your hemoglobin A1C (the three month average of your glucose levels) is very low which is good.  -Your cholesterol panel looks great with a very low LDL (the bad cholesterol) and a good HDL (the good cholesterol).   -Your magnesium level is just a touch higher than the normal range (which I see frequently).  I still think it's okay to take a lower-dose magnesium supplement, but I probably wouldn't increase the dose much.    Please let me know if you have any questions.  Best,   Nael Gruber MD

## 2022-05-09 ENCOUNTER — TRANSFERRED RECORDS (OUTPATIENT)
Dept: HEALTH INFORMATION MANAGEMENT | Facility: CLINIC | Age: 32
End: 2022-05-09
Payer: COMMERCIAL

## 2022-05-10 LAB
HUMAN PAPILLOMA VIRUS 16 DNA: NEGATIVE
HUMAN PAPILLOMA VIRUS 18 DNA: NEGATIVE
HUMAN PAPILLOMA VIRUS FINAL DIAGNOSIS: ABNORMAL
HUMAN PAPILLOMA VIRUS OTHER HR: POSITIVE

## 2022-05-12 ENCOUNTER — PATIENT OUTREACH (OUTPATIENT)
Dept: FAMILY MEDICINE | Facility: CLINIC | Age: 32
End: 2022-05-12
Payer: COMMERCIAL

## 2022-05-12 DIAGNOSIS — R87.810 ASCUS WITH POSITIVE HIGH RISK HPV CERVICAL: Primary | ICD-10-CM

## 2022-05-12 DIAGNOSIS — R87.610 ASCUS WITH POSITIVE HIGH RISK HPV CERVICAL: Primary | ICD-10-CM

## 2022-05-21 ENCOUNTER — HEALTH MAINTENANCE LETTER (OUTPATIENT)
Age: 32
End: 2022-05-21

## 2022-06-01 ENCOUNTER — TRANSFERRED RECORDS (OUTPATIENT)
Dept: HEALTH INFORMATION MANAGEMENT | Facility: CLINIC | Age: 32
End: 2022-06-01
Payer: COMMERCIAL

## 2022-06-03 NOTE — PROGRESS NOTES
GYN: Colposcopy/LEEP    Date/Time: 6/6/2022 4:45 PM  Performed by: Richard Thompson MD  Authorized by: Richard Thompson MD     Consent:     Consent obtained:  Verbal and written    Consent given by:  Patient    Procedural risks discussed:  Bleeding, possible continued pain, possible loss of function, repeat procedure, infection, failure rate and damage to other organs    Patient questions answered: yes      Patient agrees, verbalizes understanding, and wants to proceed: yes      Educational handouts given: yes      Instructions and paperwork completed: yes    Pre-procedure:     Pre-procedure timeout performed: yes      Premeds:  Ibuprofen    Prepped with: acetic acid and Lugol    Indication:     Indication:  HPV    HPV Indications:  Other high risk  Procedure:     Procedure: Colposcopy only      Under satisfactory analgesia the patient was prepped and draped in the dorsal lithotomy position: yes      [place] speculum was placed in the vagina: yes      Under colposcopic examination the transition zone was seen in entirety: yes      Biopsy(s): no    Post-procedure:     Findings: Negative      Impression: Normal appearing cervix          ICD-10-CM    1. ASCUS with positive high risk HPV cervical  R87.610 GYN: Colposcopy/LEEP    R87.810      Colposcopic examination does not reveal any abnormalities.  The patient was advised to return to clinic on 05/04/2023 for routine Pap smear and HPV.    Richard Thompson MD  [facility]  PAGER: [phone] or (W): [phone]

## 2022-06-06 ENCOUNTER — OFFICE VISIT (OUTPATIENT)
Dept: FAMILY MEDICINE | Facility: CLINIC | Age: 32
End: 2022-06-06
Payer: COMMERCIAL

## 2022-06-06 VITALS
TEMPERATURE: 96.8 F | WEIGHT: 110.8 LBS | HEART RATE: 78 BPM | SYSTOLIC BLOOD PRESSURE: 103 MMHG | DIASTOLIC BLOOD PRESSURE: 74 MMHG | RESPIRATION RATE: 18 BRPM | OXYGEN SATURATION: 96 % | BODY MASS INDEX: 21.33 KG/M2

## 2022-06-06 DIAGNOSIS — R87.810 ASCUS WITH POSITIVE HIGH RISK HPV CERVICAL: Primary | ICD-10-CM

## 2022-06-06 DIAGNOSIS — R87.610 ASCUS WITH POSITIVE HIGH RISK HPV CERVICAL: Primary | ICD-10-CM

## 2022-06-06 PROCEDURE — 57452 EXAM OF CERVIX W/SCOPE: CPT | Performed by: FAMILY MEDICINE

## 2022-06-06 PROCEDURE — 99207 PR NO CHARGE LOS: CPT | Performed by: FAMILY MEDICINE

## 2022-07-01 ENCOUNTER — PATIENT OUTREACH (OUTPATIENT)
Dept: FAMILY MEDICINE | Facility: CLINIC | Age: 32
End: 2022-07-01

## 2022-07-11 NOTE — TELEPHONE ENCOUNTER
REFERRAL INFORMATION:    Referring Provider:      Referring Clinic:      Reason for Visit/Diagnosis: flatulence ab pain     FUTURE VISIT INFORMATION:    Appointment Date: 10.6.22    Appointment Time: 10 AM     NOTES STATUS DETAILS   OFFICE NOTE from Referring Provider N/A    OFFICE NOTE from Other Specialist Received 6.1.22, 3.28.22 SYLVIA Oquendo   HOSPITAL DISCHARGE SUMMARY/  ED VISITS N/A    OPERATIVE REPORT N/A    MEDICATION LIST Internal         ENDOSCOPY  N/A    COLONOSCOPY N/A    ERCP N/A    EUS N/A    STOOL TESTING N/A    PERTINENT LABS Internal    PATHOLOGY REPORTS (RELATED) N/A    IMAGING (CT, MRI, EGD, MRCP, Small Bowel Follow Through/SBT, MR/CT Enterography) N/A

## 2022-09-09 ENCOUNTER — TRANSFERRED RECORDS (OUTPATIENT)
Dept: HEALTH INFORMATION MANAGEMENT | Facility: CLINIC | Age: 32
End: 2022-09-09

## 2022-09-17 ENCOUNTER — HEALTH MAINTENANCE LETTER (OUTPATIENT)
Age: 32
End: 2022-09-17

## 2022-09-22 ENCOUNTER — MYC MEDICAL ADVICE (OUTPATIENT)
Dept: FAMILY MEDICINE | Facility: CLINIC | Age: 32
End: 2022-09-22

## 2022-10-06 ENCOUNTER — PRE VISIT (OUTPATIENT)
Dept: GASTROENTEROLOGY | Facility: CLINIC | Age: 32
End: 2022-10-06

## 2022-11-09 DIAGNOSIS — Z79.899 ENCOUNTER FOR EYE EXAM DUE TO HIGH RISK MEDICATION: Primary | ICD-10-CM

## 2022-11-09 NOTE — PROGRESS NOTES
HPI:  Patient presents for monitoring of high risk medication - retin-A which was started in 03/19/2021. Vision has been more blurry. She would like new glasses.       Pertinent Medical History:    Irritable bowl syndrome    Chlamydia    Ocular History:    LASIK, both eyes. 2013    Dry Eye Syndrome, both eyes.     Possible family history of glaucoma - maybe one of the grandparents.     Corazon JACKSONL 11/27/2018    Eye Medications:    Retin-A (started on 03/19/2021)    Assessment and Plan:  1.   High Risk Medication - Using Retin-A since 03/19/2021    Visual field 11/15/2022: Both eyes: No visual field defects.    Optic Nerve OCT 11/15/2022: Both eyes: no edema.    There is no papilledema seen on exam.    Recommend annual dilated eye exam with visual field and optic nerve OCT.     2.   H/O LASIK, both eyes.     Patient is interested in touch up - can follow up at [facility] or [facility]. Can hold off on glasses if patient decides on touch up.     3.   Dry Eye Syndrome/Meibomian Gland Dysfunction, both eyes.     Contributing to intermittent redness.     Continue systane 4 times daily both eyes.     Continue fish oil - currently take 1 gram daily - 2 gram daily is therapeutic.     Continue warm compress daily.     4.   Myopia, both eyes.     Distance only glasses as needed.     Hand held PD - we discussed is not as accurate as pupillometer or digital technology and may cause headache or discomfort. If that is case, recommend getting PD re-measured with digital technology. Patient expresses understanding.         Patient consented to a dilated eye exam:    Yes. Side effects discussed.    Medical History:  Past Medical History:   Diagnosis Date     Abnormal Pap smear of cervix 02/28/2018 02/28/18: see problem list.     Cervical high risk HPV (human papillomavirus) test positive 03/05/2019 03/05/19: See problem list.        Medications:  Current Outpatient Medications   Medication Sig Dispense Refill     Biotin 1 MG  CAPS        Cholecalciferol (VITAMIN D-3 PO) Take 1 capsule by mouth daily       clindamycin (CLINDAMAX) 1 % external gel Apply 1 Application topically as needed       Cyanocobalamin (B-12 PO) Take 1 tablet by mouth daily       hydrocortisone 2.5 % cream Apply 1 Application topically as needed       levonorgestrel (MIRENA) 20 MCG/DAY IUD 1 each by Intrauterine route once (Liletta IUD - placed at [facility] in 11/21)       magnesium 250 MG tablet Take 1 tablet by mouth daily       Omega-3 Fatty Acids (FISH OIL) 1200 MG capsule Take 1,200 mg by mouth daily       polyethylene glycol (MIRALAX) 17 g packet Take 1 packet by mouth daily as needed       Probiotic Product (PROBIOTIC DAILY PO)        spironolactone (ALDACTONE) 50 MG tablet Take 50 mg by mouth daily Through dermatology       tretinoin (RETIN-A) 0.01 % external gel Apply topically At Bedtime     Complete documentation of historical and exam elements from today's encounter can be found in the full encounter summary report (not reduplicated in this progress note). I personally obtained the chief complaint(s) and history of present illness.  I confirmed and edited as necessary the review of systems, past medical/surgical history, family history, social history, and examination findings as documented by others; and I examined the patient myself. I personally reviewed the relevant tests, images, and reports as documented above. I formulated and edited as necessary the assessment and plan and discussed the findings and management plan with the patient and family. - Jerome Pleitez OD

## 2022-11-15 ENCOUNTER — OFFICE VISIT (OUTPATIENT)
Dept: OPHTHALMOLOGY | Facility: CLINIC | Age: 32
End: 2022-11-15
Attending: OPTOMETRIST
Payer: COMMERCIAL

## 2022-11-15 DIAGNOSIS — Z98.890 HX OF LASIK: Primary | ICD-10-CM

## 2022-11-15 DIAGNOSIS — H52.13 MYOPIA OF BOTH EYES: ICD-10-CM

## 2022-11-15 DIAGNOSIS — Z79.899 ENCOUNTER FOR EYE EXAM DUE TO HIGH RISK MEDICATION: ICD-10-CM

## 2022-11-15 DIAGNOSIS — H04.123 DRY EYE SYNDROME OF BOTH EYES: ICD-10-CM

## 2022-11-15 PROCEDURE — 92083 EXTENDED VISUAL FIELD XM: CPT | Performed by: OPTOMETRIST

## 2022-11-15 PROCEDURE — G0463 HOSPITAL OUTPT CLINIC VISIT: HCPCS | Mod: 25

## 2022-11-15 PROCEDURE — 92014 COMPRE OPH EXAM EST PT 1/>: CPT | Performed by: OPTOMETRIST

## 2022-11-15 PROCEDURE — 92133 CPTRZD OPH DX IMG PST SGM ON: CPT | Performed by: OPTOMETRIST

## 2022-11-15 PROCEDURE — 92015 DETERMINE REFRACTIVE STATE: CPT

## 2022-11-15 ASSESSMENT — CUP TO DISC RATIO
OS_RATIO: 0.2
OD_RATIO: 0.15

## 2022-11-15 ASSESSMENT — CONF VISUAL FIELD
OS_SUPERIOR_NASAL_RESTRICTION: 0
OS_NORMAL: 1
OD_INFERIOR_NASAL_RESTRICTION: 0
OD_SUPERIOR_NASAL_RESTRICTION: 0
OS_SUPERIOR_TEMPORAL_RESTRICTION: 0
OS_INFERIOR_TEMPORAL_RESTRICTION: 0
OD_INFERIOR_TEMPORAL_RESTRICTION: 0
OD_NORMAL: 1
OD_SUPERIOR_TEMPORAL_RESTRICTION: 0
OS_INFERIOR_NASAL_RESTRICTION: 0
METHOD: COUNTING FINGERS

## 2022-11-15 ASSESSMENT — SLIT LAMP EXAM - LIDS
COMMENTS: MEIBOMIAN GLAND DYSFUNCTION
COMMENTS: MEIBOMIAN GLAND DYSFUNCTION

## 2022-11-15 ASSESSMENT — REFRACTION_WEARINGRX
OS_CYLINDER: SPHERE
OS_SPHERE: -1.00
OD_SPHERE: -1.25
OD_AXIS: 160
OD_CYLINDER: +0.50

## 2022-11-15 ASSESSMENT — REFRACTION_MANIFEST
OS_SPHERE: -1.25
OD_SPHERE: -1.50
OS_CYLINDER: SPHERE
OD_CYLINDER: +0.50
OD_AXIS: 160

## 2022-11-15 ASSESSMENT — VISUAL ACUITY
CORRECTION_TYPE: GLASSES
OD_CC: 20/20
METHOD: SNELLEN - LINEAR
OS_CC: 20/20

## 2022-11-15 ASSESSMENT — EXTERNAL EXAM - RIGHT EYE: OD_EXAM: NORMAL

## 2022-11-15 ASSESSMENT — TONOMETRY
OS_IOP_MMHG: 12
IOP_METHOD: ICARE
OD_IOP_MMHG: 12

## 2022-11-15 ASSESSMENT — EXTERNAL EXAM - LEFT EYE: OS_EXAM: NORMAL

## 2022-11-15 NOTE — NURSING NOTE
Chief Complaints and History of Present Illnesses   Patient presents with     Follow Tx Of High Risk Med     Chief Complaint(s) and History of Present Illness(es)     Follow Tx Of High Risk Med           Comments    Patient states that his vision is blurry at distance. Has gotten worse since last year. No pain and irritation. No flashes of lights. No floaters     High risk med exam. Pt uses tretinoin (RETIN-A) 0.01 % external gel    Ocular Meds:systane in BE     Hx: LASIK 2013 (BE)     Kamari Barraza COT, November 15, 2022 9:00 AM

## 2023-01-06 ENCOUNTER — OFFICE VISIT (OUTPATIENT)
Dept: URGENT CARE | Facility: URGENT CARE | Age: 33
End: 2023-01-06
Payer: COMMERCIAL

## 2023-01-06 VITALS
TEMPERATURE: 97.6 F | DIASTOLIC BLOOD PRESSURE: 60 MMHG | SYSTOLIC BLOOD PRESSURE: 101 MMHG | HEART RATE: 76 BPM | WEIGHT: 121 LBS | BODY MASS INDEX: 23.29 KG/M2 | OXYGEN SATURATION: 99 %

## 2023-01-06 DIAGNOSIS — R30.0 DYSURIA: Primary | ICD-10-CM

## 2023-01-06 DIAGNOSIS — R11.0 NAUSEA: ICD-10-CM

## 2023-01-06 LAB
ALBUMIN UR-MCNC: NEGATIVE MG/DL
APPEARANCE UR: CLEAR
BILIRUB UR QL STRIP: NEGATIVE
COLOR UR AUTO: YELLOW
GLUCOSE UR STRIP-MCNC: NEGATIVE MG/DL
HGB UR QL STRIP: NEGATIVE
KETONES UR STRIP-MCNC: NEGATIVE MG/DL
LEUKOCYTE ESTERASE UR QL STRIP: NEGATIVE
NITRATE UR QL: NEGATIVE
PH UR STRIP: 7 [PH] (ref 5–7)
SP GR UR STRIP: 1.01 (ref 1–1.03)
UROBILINOGEN UR STRIP-ACNC: 0.2 E.U./DL

## 2023-01-06 PROCEDURE — 81003 URINALYSIS AUTO W/O SCOPE: CPT

## 2023-01-06 PROCEDURE — 99213 OFFICE O/P EST LOW 20 MIN: CPT

## 2023-01-06 RX ORDER — ONDANSETRON 4 MG/1
TABLET, FILM COATED ORAL
Qty: 15 TABLET | Refills: 0 | Status: SHIPPED | OUTPATIENT
Start: 2023-01-06 | End: 2024-04-02

## 2023-01-07 NOTE — PROGRESS NOTES
Assessment & Plan     Dysuria    - UA reflex to Microscopic and Culture    Reassured UA is negative for UTI.    Nausea    - ondansetron (ZOFRAN) 4 MG tablet; 1-2 tablets q6-8 hours prn nausea    Prescribed Zofran prn nausea.  Continue with increased fluids and rest.  Close Follow-up if no change or new or worsening sx prn.    Delfina Garcia MD   Urgent Care Provider  [facility]RGENT [facility] TIFFANY Kellogg is a 32 year old, presenting for the following health issues:  Urgent Care and UTI (Stomach feels off, some pain and nausea, frequency, vomiting.)      HPI   Presents with concern for UTI today.  Hx of IBS- constipation predominant.  Tracks fiber intake and uses probiotic.  Had a burger today which she normally does not eat and this left her feeling queasy.  Threw up in UC and now feels better.  Notes more urinary urgency and frequency.  No fever or flank pain.  No localized abdominal pain.    States feels she can tolerate po tonite.  No diarrhea.    Has IUD in place.  Denies possible pregnancy.  No vaginal discharge or bleeding.    Review of Systems   Constitutional, HEENT, cardiovascular, pulmonary, GI, , musculoskeletal, neuro, skin, endocrine and psych systems are negative, except as otherwise noted.      Objective    /60   Pulse 76   Temp 97.6  F (36.4  C) (Tympanic)   Wt 54.9 kg (121 lb)   SpO2 99%   BMI 23.29 kg/m    Body mass index is 23.29 kg/m .  Physical Exam   GENERAL: healthy, alert and no distress  EYES: Eyes grossly normal to inspection, PERRL and conjunctivae and sclerae normal  PSYCH: mentation appears normal, affect normal/bright    Results for orders placed or performed in visit on 01/06/23 (from the past 24 hour(s))   UA reflex to Microscopic and Culture    Specimen: Urine, Midstream   Result Value Ref Range    Color Urine Yellow Colorless, Straw, Light Yellow, Yellow    Appearance Urine Clear Clear    Glucose Urine Negative Negative mg/dL    Bilirubin Urine  Negative Negative    Ketones Urine Negative Negative mg/dL    Specific Gravity Urine 1.015 1.003 - 1.035    Blood Urine Negative Negative    pH Urine 7.0 5.0 - 7.0    Protein Albumin Urine Negative Negative mg/dL    Urobilinogen Urine 0.2 0.2, 1.0 E.U./dL    Nitrite Urine Negative Negative    Leukocyte Esterase Urine Negative Negative    Narrative    Microscopic not indicated

## 2023-05-23 ASSESSMENT — ENCOUNTER SYMPTOMS
PALPITATIONS: 0
DIARRHEA: 0
SHORTNESS OF BREATH: 0
SORE THROAT: 0
HEMATURIA: 0
WEAKNESS: 0
DIZZINESS: 0
NERVOUS/ANXIOUS: 0
FREQUENCY: 0
FEVER: 0
BREAST MASS: 0
CONSTIPATION: 0
EYE PAIN: 0
PARESTHESIAS: 0
NAUSEA: 0
JOINT SWELLING: 0
ABDOMINAL PAIN: 0
DYSURIA: 0
CHILLS: 0
HEARTBURN: 0
HEMATOCHEZIA: 0
MYALGIAS: 0
COUGH: 0
ARTHRALGIAS: 0
HEADACHES: 0

## 2023-05-23 ASSESSMENT — PATIENT HEALTH QUESTIONNAIRE - PHQ9
SUM OF ALL RESPONSES TO PHQ QUESTIONS 1-9: 1
SUM OF ALL RESPONSES TO PHQ QUESTIONS 1-9: 1
10. IF YOU CHECKED OFF ANY PROBLEMS, HOW DIFFICULT HAVE THESE PROBLEMS MADE IT FOR YOU TO DO YOUR WORK, TAKE CARE OF THINGS AT HOME, OR GET ALONG WITH OTHER PEOPLE: NOT DIFFICULT AT ALL

## 2023-05-24 ENCOUNTER — OFFICE VISIT (OUTPATIENT)
Dept: FAMILY MEDICINE | Facility: CLINIC | Age: 33
End: 2023-05-24
Payer: COMMERCIAL

## 2023-05-24 VITALS
OXYGEN SATURATION: 98 % | DIASTOLIC BLOOD PRESSURE: 64 MMHG | BODY MASS INDEX: 22.93 KG/M2 | HEIGHT: 60 IN | TEMPERATURE: 97.3 F | RESPIRATION RATE: 16 BRPM | WEIGHT: 116.8 LBS | HEART RATE: 66 BPM | SYSTOLIC BLOOD PRESSURE: 90 MMHG

## 2023-05-24 DIAGNOSIS — Z13.6 CARDIOVASCULAR SCREENING; LDL GOAL LESS THAN 130: ICD-10-CM

## 2023-05-24 DIAGNOSIS — K58.8 OTHER IRRITABLE BOWEL SYNDROME: ICD-10-CM

## 2023-05-24 DIAGNOSIS — K52.21 FOOD PROTEIN INDUCED ENTEROCOLITIS SYNDROME: ICD-10-CM

## 2023-05-24 DIAGNOSIS — Z11.3 SCREEN FOR STD (SEXUALLY TRANSMITTED DISEASE): ICD-10-CM

## 2023-05-24 DIAGNOSIS — R87.810 ASCUS WITH POSITIVE HIGH RISK HPV CERVICAL: ICD-10-CM

## 2023-05-24 DIAGNOSIS — R87.610 ASCUS WITH POSITIVE HIGH RISK HPV CERVICAL: ICD-10-CM

## 2023-05-24 DIAGNOSIS — F32.0 MILD MAJOR DEPRESSION (H): ICD-10-CM

## 2023-05-24 DIAGNOSIS — N94.10 DYSPAREUNIA IN FEMALE: ICD-10-CM

## 2023-05-24 DIAGNOSIS — Z00.00 ROUTINE GENERAL MEDICAL EXAMINATION AT A HEALTH CARE FACILITY: Primary | ICD-10-CM

## 2023-05-24 DIAGNOSIS — K59.00 CONSTIPATION, UNSPECIFIED CONSTIPATION TYPE: ICD-10-CM

## 2023-05-24 LAB
ALBUMIN SERPL BCG-MCNC: 5 G/DL (ref 3.5–5.2)
ALP SERPL-CCNC: 61 U/L (ref 35–104)
ALT SERPL W P-5'-P-CCNC: 19 U/L (ref 10–35)
ANION GAP SERPL CALCULATED.3IONS-SCNC: 13 MMOL/L (ref 7–15)
AST SERPL W P-5'-P-CCNC: 36 U/L (ref 10–35)
BILIRUB SERPL-MCNC: 1.5 MG/DL
BUN SERPL-MCNC: 17.4 MG/DL (ref 6–20)
C TRACH DNA SPEC QL NAA+PROBE: NEGATIVE
CALCIUM SERPL-MCNC: 9.9 MG/DL (ref 8.6–10)
CHLORIDE SERPL-SCNC: 100 MMOL/L (ref 98–107)
CHOLEST SERPL-MCNC: 157 MG/DL
CREAT SERPL-MCNC: 0.85 MG/DL (ref 0.51–0.95)
DEPRECATED HCO3 PLAS-SCNC: 25 MMOL/L (ref 22–29)
GFR SERPL CREATININE-BSD FRML MDRD: >90 ML/MIN/1.73M2
GLUCOSE SERPL-MCNC: 74 MG/DL (ref 70–99)
HDLC SERPL-MCNC: 73 MG/DL
HIV 1+2 AB+HIV1 P24 AG SERPL QL IA: NONREACTIVE
LDLC SERPL CALC-MCNC: 78 MG/DL
MAGNESIUM SERPL-MCNC: 2 MG/DL (ref 1.7–2.3)
N GONORRHOEA DNA SPEC QL NAA+PROBE: NEGATIVE
NONHDLC SERPL-MCNC: 84 MG/DL
POTASSIUM SERPL-SCNC: 4.3 MMOL/L (ref 3.4–5.3)
PROT SERPL-MCNC: 7.3 G/DL (ref 6.4–8.3)
SODIUM SERPL-SCNC: 138 MMOL/L (ref 136–145)
T PALLIDUM AB SER QL: NONREACTIVE
TRIGL SERPL-MCNC: 32 MG/DL

## 2023-05-24 PROCEDURE — 99395 PREV VISIT EST AGE 18-39: CPT | Performed by: FAMILY MEDICINE

## 2023-05-24 PROCEDURE — G0145 SCR C/V CYTO,THINLAYER,RESCR: HCPCS | Performed by: FAMILY MEDICINE

## 2023-05-24 PROCEDURE — 83735 ASSAY OF MAGNESIUM: CPT | Performed by: FAMILY MEDICINE

## 2023-05-24 PROCEDURE — 36415 COLL VENOUS BLD VENIPUNCTURE: CPT | Performed by: FAMILY MEDICINE

## 2023-05-24 PROCEDURE — 80053 COMPREHEN METABOLIC PANEL: CPT | Performed by: FAMILY MEDICINE

## 2023-05-24 PROCEDURE — 87591 N.GONORRHOEAE DNA AMP PROB: CPT | Performed by: FAMILY MEDICINE

## 2023-05-24 PROCEDURE — 86780 TREPONEMA PALLIDUM: CPT | Performed by: FAMILY MEDICINE

## 2023-05-24 PROCEDURE — 87491 CHLMYD TRACH DNA AMP PROBE: CPT | Performed by: FAMILY MEDICINE

## 2023-05-24 PROCEDURE — 87389 HIV-1 AG W/HIV-1&-2 AB AG IA: CPT | Performed by: FAMILY MEDICINE

## 2023-05-24 PROCEDURE — 87624 HPV HI-RISK TYP POOLED RSLT: CPT | Performed by: FAMILY MEDICINE

## 2023-05-24 PROCEDURE — 80061 LIPID PANEL: CPT | Performed by: FAMILY MEDICINE

## 2023-05-24 ASSESSMENT — ENCOUNTER SYMPTOMS
HEMATURIA: 0
FEVER: 0
EYE PAIN: 0
COUGH: 0
HEMATOCHEZIA: 0
FREQUENCY: 0
JOINT SWELLING: 0
SHORTNESS OF BREATH: 0
HEADACHES: 0
ARTHRALGIAS: 0
MYALGIAS: 0
HEARTBURN: 0
PALPITATIONS: 0
ABDOMINAL PAIN: 0
NERVOUS/ANXIOUS: 0
BREAST MASS: 0
SORE THROAT: 0
WEAKNESS: 0
CONSTIPATION: 0
DIARRHEA: 0
PARESTHESIAS: 0
DIZZINESS: 0
CHILLS: 0
NAUSEA: 0
DYSURIA: 0

## 2023-05-24 ASSESSMENT — PAIN SCALES - GENERAL: PAINLEVEL: MILD PAIN (2)

## 2023-05-24 NOTE — PROGRESS NOTES
SUBJECTIVE:   CC: Bess is an 33 year old who presents for preventive health visit.       5/24/2023     9:19 AM   Additional Questions   Roomed by Nelly BEACH   Patient has been advised of split billing requirements and indicates understanding: Yes  Healthy Habits:     Getting at least 3 servings of Calcium per day:  Yes    Bi-annual eye exam:  Yes    Dental care twice a year:  Yes    Sleep apnea or symptoms of sleep apnea:  None    Diet:  Regular (no restrictions)    Frequency of exercise:  6-7 days/week    Duration of exercise:  Greater than 60 minutes    Taking medications regularly:  Yes    Medication side effects:  None    PHQ-2 Total Score: 0    Additional concerns today:  Yes (recommendation about magnesium levels, referral for pelvic floor therapist for constipation, abdominal pain on left side after drinking alcohol and allergy testing for certain types of fish)    Wondering about magnesium levels- started mag citrate after last visit, and it has been helping with constipation, along with other things. Taking 300mg/d- was taking 400mg/d.    Prior- had bad constipation for a year, colonoscopy.  Afterwards- doubled dose of citrucel and metamucil and miralax, now reg dose fiber only miralax on vacation).  Lowered protein intake a little bit (120g/d- was 130-140g/day prior).  Fiber - tracking it, increasing it- 40g in diet.  Walking pad in apartment, which helps.  Sx's now- most days she has a good BM, few days without BM, but usually fine the next day.  Hardly ever having the bad smelling gas.    Most sx's better, but the urge to stool is not great, feels diminished, wonders if she did some damage. She has some pain during sex as well, not horrible but not unusual.  Wondering about pelvic floor therapy for these sx's?      Vomiting after eating fish---  Fish- vomited after eating salmon, maybe tilapia.  Has tuna quite a bit and that seems fine.  Rarely has tried shellfish, so unsure, though thinks shrimp was  okay.  Started ~2020.  Sx's usually 4-6 hrs after eating.    Abdominal sx's, tenderness, mild pain in LLQ- usually day after etoh or if goes off reg diet.  At baseline, eats a really healthy, repetitive diet, high protein/fiber. Lifting.  Limits calories to be in a calorie even or deficit most days.   Seems like if she goes outside of that, more foods, more etoh, especially if more etoh.  Her stomach feels tender to touch, the next day, and sometimes mild pain in LLQ.    Etoh- usually minimal intake during the week.  Last summer, more etoh- Fri-Sun, several drinks on one of the days, and few on the other days.  Now lower, maybe 2 drinks a week, sometimes more, max 7/wk.      Mild mood sx's, some down days, likely lonliness related.  Has therapist, not freq appts, but reaches out if sx's.      Social History     Tobacco Use     Smoking status: Never     Smokeless tobacco: Never   Vaping Use     Vaping status: Never Used   Substance Use Topics     Alcohol use: Yes     Comment: 1-2 times a week couple drinks              5/23/2023     4:59 PM   Alcohol Use   Prescreen: >3 drinks/day or >7 drinks/week? No          View : No data to display.              Reviewed orders with patient.  Reviewed health maintenance and updated orders accordingly - Yes      Lab work is in process  Labs reviewed in EPIC  BP Readings from Last 3 Encounters:   05/24/23 90/64   01/06/23 101/60   06/06/22 103/74    Wt Readings from Last 3 Encounters:   05/24/23 53 kg (116 lb 12.8 oz)   01/06/23 54.9 kg (121 lb)   06/06/22 50.3 kg (110 lb 12.8 oz)                  Patient Active Problem List   Diagnosis     IUD contraception     Other irritable bowel syndrome     ASCUS with positive high risk HPV cervical     Acne vulgaris     Dysmenorrhea     Food protein induced enterocolitis syndrome     Constipation, unspecified constipation type     Dyspareunia in female     Past Surgical History:   Procedure Laterality Date     BIOPSY OF SKIN LESION  07/2017     atypical nevus, benign     COLONOSCOPY  2022    [facility], no issues found     LASIK  2013       Social History     Tobacco Use     Smoking status: Never     Smokeless tobacco: Never   Vaping Use     Vaping status: Never Used   Substance Use Topics     Alcohol use: Yes     Comment: 1-2 times a week couple drinks      Family History   Problem Relation Age of Onset     Cancer Mother 57        CLL, donig well, no txt     Gastrointestinal Disease Mother         IBS     Other Cancer Mother         CLL     Gastrointestinal Disease Sister         IBS     Depression Sister      Anxiety Disorder Sister      Cancer Maternal Grandmother 65        CML, had txt,  at 90, unsure cause of death     Coronary Artery Disease Maternal Grandfather         later in life     Colon Cancer Paternal Grandmother         60s     Coronary Artery Disease Paternal Grandfather         later in life     Colon Cancer Paternal Grandfather         60s     Hypertension Father      Cerebrovascular Disease Father      Anxiety Disorder Father      Glaucoma No family hx of      Macular Degeneration No family hx of          Current Outpatient Medications   Medication Sig Dispense Refill     Cholecalciferol (VITAMIN D-3 PO) Take 125 mcg by mouth daily       clindamycin (CLINDAMAX) 1 % external gel Apply 1 Application topically as needed       Cyanocobalamin (B-12 PO) Take 1 tablet by mouth daily       hydrocortisone 2.5 % cream Apply 1 Application topically as needed       levonorgestrel (MIRENA) 20 MCG/DAY IUD 1 each by Intrauterine route once (Liletta IUD - [facility] at [facility] in )       Magnesium 300 MG CAPS Take 1 tablet by mouth daily       Omega-3 Fatty Acids (FISH OIL) 1200 MG capsule Take 1,200 mg by mouth daily       ondansetron (ZOFRAN) 4 MG tablet 1-2 tablets q6-8 hours prn nausea 15 tablet 0     Probiotic Product (PROBIOTIC DAILY PO)        spironolactone (ALDACTONE) 50 MG tablet Take 50 mg by mouth daily Through  dermatology       tretinoin (RETIN-A) 0.01 % external gel Apply topically At Bedtime       Allergies   Allergen Reactions     Seasonal Allergies      Recent Labs   Lab Test 05/24/23  1022 05/04/22  0925 03/19/21  0853   A1C  --  4.5 4.5   LDL 78 64 67   HDL 73 58 59   TRIG 32 46 58   ALT 19  --   --    CR 0.85 0.82 0.78   GFRESTIMATED >90 >90 >90   GFRESTBLACK  --   --  >90   POTASSIUM 4.3 4.2 3.7   TSH  --   --  1.88        Breast Cancer Screening:        3/18/2021    11:49 AM   Breast CA Risk Assessment (FHS-7)   Do you have a family history of breast, colon, or ovarian cancer? No / Unknown         Patient under 40 years of age: Routine Mammogram Screening not recommended.   Pertinent mammograms are reviewed under the imaging tab.    History of abnormal Pap smear: YES - updated in Problem List and Health Maintenance accordingly      Latest Ref Rng & Units 5/4/2022     8:52 AM 3/19/2021     9:12 AM 3/19/2021     8:37 AM   PAP / HPV   PAP  Negative for Intraepithelial Lesion or Malignancy (NILM)       PAP (Historical)    NIL     HPV 16 DNA Negative Negative   Negative      HPV 18 DNA Negative Negative   Negative      Other HR HPV Negative Positive   Positive        Reviewed and updated as needed this visit by clinical staff   Tobacco  Allergies  Meds  Problems  Med Hx  Surg Hx  Fam Hx          Reviewed and updated as needed this visit by Provider   Tobacco  Allergies  Meds  Problems  Med Hx  Surg Hx  Fam Hx             Review of Systems   Constitutional: Negative for chills and fever.   HENT: Negative for congestion, ear pain, hearing loss and sore throat.    Eyes: Negative for pain and visual disturbance.   Respiratory: Negative for cough and shortness of breath.    Cardiovascular: Negative for chest pain, palpitations and peripheral edema.   Gastrointestinal: Negative for abdominal pain, constipation, diarrhea, heartburn, hematochezia and nausea.   Breasts:  Negative for tenderness, breast mass and  "discharge.   Genitourinary: Negative for dysuria, frequency, genital sores, hematuria, pelvic pain, urgency, vaginal bleeding and vaginal discharge.   Musculoskeletal: Negative for arthralgias, joint swelling and myalgias.   Skin: Negative for rash.   Neurological: Negative for dizziness, weakness, headaches and paresthesias.   Psychiatric/Behavioral: Negative for mood changes. The patient is not nervous/anxious.           OBJECTIVE:   BP 90/64 (BP Location: Left arm, Patient Position: Sitting, Cuff Size: Adult Regular)   Pulse 66   Temp 97.3  F (36.3  C) (Temporal)   Resp 16   Ht 1.53 m (5' 0.25\")   Wt 53 kg (116 lb 12.8 oz)   LMP  (LMP Unknown)   SpO2 98%   BMI 22.62 kg/m    Physical Exam  GENERAL: healthy, alert and no distress  EYES: Eyes grossly normal to inspection, PERRL and conjunctivae and sclerae normal  HENT: ear canals and TM's normal, nose and mouth without ulcers or lesions  NECK: no adenopathy, no asymmetry, masses, or scars and thyroid normal to palpation  RESP: lungs clear to auscultation - no rales, rhonchi or wheezes  BREAST: normal without masses, tenderness or nipple discharge and no palpable axillary masses or adenopathy  CV: regular rate and rhythm, normal S1 S2, no S3 or S4, no murmur, click or rub, no peripheral edema and peripheral pulses strong  ABDOMEN: soft, nontender, no hepatosplenomegaly, no masses and bowel sounds normal   (female): normal female external genitalia, normal urethral meatus, vaginal mucosa pink, moist, well rugated, and normal cervix/adnexa/uterus without masses or discharge  MS: no gross musculoskeletal defects noted, no edema  SKIN: no suspicious lesions or rashes  NEURO: Normal strength and tone, mentation intact and speech normal  PSYCH: mentation appears normal, affect normal/bright    Diagnostic Test Results:  Labs reviewed in Epic    ASSESSMENT/PLAN:       ICD-10-CM    1. Routine general medical examination at a health care facility  Z00.00       2. " Food protein induced enterocolitis syndrome  K52.21       3. Constipation, unspecified constipation type  K59.00 Physical Therapy Referral     Comprehensive metabolic panel (BMP + Alb, Alk Phos, ALT, AST, Total. Bili, TP)     Magnesium     Comprehensive metabolic panel (BMP + Alb, Alk Phos, ALT, AST, Total. Bili, TP)     Magnesium      4. Dyspareunia in female  N94.10 Physical Therapy Referral      5. Mild major depression (H)  F32.0       6. Screen for STD (sexually transmitted disease)  Z11.3 HIV Antigen Antibody Combo     Treponema Abs w Reflex to RPR and Titer     NEISSERIA GONORRHOEA PCR     CHLAMYDIA TRACHOMATIS PCR     HIV Antigen Antibody Combo     Treponema Abs w Reflex to RPR and Titer      7. CARDIOVASCULAR SCREENING; LDL GOAL LESS THAN 130  Z13.6 Lipid panel reflex to direct LDL Fasting     Lipid panel reflex to direct LDL Fasting      8. Other irritable bowel syndrome  K58.8       9. ASCUS with positive high risk HPV cervical  R87.610 Pap Screen with HPV - recommended age 30 - 65 years    R87.810 HPV Hold (Lab Only)        CPE- Discussed diet, calcium/D and exercise.  Thin prep pap was done.  Eye and dental care UTD or recommended f/u.  No immunizations needed today.  See orders below for tests ordered and screening needed.     Constipation/rectal sensation abnormality/pain with intercourse-  Constipation sx's improved with diet and supplement improvements, walking pad.  Still feels lack of sensation in rectal area when she needs to go, still needs to strain some.  Also has mild dyspareunia.  Will refer to pelvic floor therapy.    Food protein induced enterocolitis-   Vomiting sx's 4-6 hrs after eating some fish, never tuna, more often salmon, possibly tilapia, rarely has shellfish so unsure.  Started in 2020.  Intense sx's.  Discussed likely dx as above.    Unfortunately, there are no valid testing options and minimal txt options other than avoidance of those foods.    Abd tenderness/IBS- s/p etoh or  eating outside regular fairly strict diet.  Next day, noticed abd is tender to the touch, and sometimes has LLQ discomfort.  Could try maalox, tums or pepcid, but unsure if these will help.    Could consider GI/dietician referral if desired in future.    Mild MDD sx's- usually short lived, doing well with coping skills at this time, no meds.    Patient has been advised of split billing requirements and indicates understanding: Yes      COUNSELING:  Reviewed preventive health counseling, as reflected in patient instructions        She reports that she has never smoked. She has never used smokeless tobacco.      Tati Gruber MD  [facility]  Answers for HPI/ROS submitted by the patient on 5/23/2023  If you checked off any problems, how difficult have these problems made it for you to do your work, take care of things at home, or get along with other people?: Not difficult at all  PHQ9 TOTAL SCORE: 1

## 2023-05-25 PROBLEM — F32.0 MILD MAJOR DEPRESSION (H): Status: ACTIVE | Noted: 2023-05-25

## 2023-05-26 LAB
BKR LAB AP GYN ADEQUACY: NORMAL
BKR LAB AP GYN INTERPRETATION: NORMAL
BKR LAB AP HPV REFLEX: NORMAL
BKR LAB AP PREVIOUS ABNL DX: NORMAL
BKR LAB AP PREVIOUS ABNORMAL: NORMAL
PATH REPORT.COMMENTS IMP SPEC: NORMAL
PATH REPORT.COMMENTS IMP SPEC: NORMAL
PATH REPORT.RELEVANT HX SPEC: NORMAL

## 2023-05-29 NOTE — RESULT ENCOUNTER NOTE
-Your STD labs all came back negative (gonorrhea, chlamydia, HIV and syphilis).  -Your cholesterol panel looks great with a low LDL (the bad cholesterol) and a good/high HDL (the good cholesterol).   -Your CMP (which includes electrolyte levels, blood sugar levels, and kidney and liver function tests) looks good other than the slightly elevated AST and bilirubin levels (both liver function labs).  We should check these again at your next visit.  -Your magnesium level is in a good range.    Please let me know if you have any questions.  Best,   Nael Gruber MD

## 2023-05-30 LAB
HUMAN PAPILLOMA VIRUS 16 DNA: NEGATIVE
HUMAN PAPILLOMA VIRUS 18 DNA: NEGATIVE
HUMAN PAPILLOMA VIRUS FINAL DIAGNOSIS: NORMAL
HUMAN PAPILLOMA VIRUS OTHER HR: NEGATIVE

## 2023-06-30 ENCOUNTER — THERAPY VISIT (OUTPATIENT)
Dept: PHYSICAL THERAPY | Facility: CLINIC | Age: 33
End: 2023-06-30
Attending: FAMILY MEDICINE
Payer: COMMERCIAL

## 2023-06-30 DIAGNOSIS — N94.10 DYSPAREUNIA IN FEMALE: ICD-10-CM

## 2023-06-30 DIAGNOSIS — K59.00 CONSTIPATION, UNSPECIFIED CONSTIPATION TYPE: ICD-10-CM

## 2023-06-30 DIAGNOSIS — M99.05 SOMATIC DYSFUNCTION OF PELVIS REGION: ICD-10-CM

## 2023-06-30 PROCEDURE — 97110 THERAPEUTIC EXERCISES: CPT | Mod: GP | Performed by: PHYSICAL THERAPIST

## 2023-06-30 PROCEDURE — 97161 PT EVAL LOW COMPLEX 20 MIN: CPT | Mod: GP | Performed by: PHYSICAL THERAPIST

## 2023-06-30 PROCEDURE — 97530 THERAPEUTIC ACTIVITIES: CPT | Mod: GP | Performed by: PHYSICAL THERAPIST

## 2023-06-30 NOTE — PROGRESS NOTES
PHYSICAL THERAPY EVALUATION  Type of Visit: Evaluation    See electronic medical record for Abuse and Falls Screening details.    Subjective      Presenting condition or subjective complaint: Chronic constipation for all of 2022, engaged in much straining and now I have occasional constipation and hemmorrhoids, some of which have become bloody and painful. Also experience some pain during intercourse.  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  Hobbies/Interests: Weightlifting, dance    Patient goals for therapy: Pass stool without straining    Pain assessment: See objective evaluation for additional pain details     Objective      PELVIC EVALUATION  ADDITIONAL HISTORY:  Sex assigned at birth: Female  Gender identity: Female    Pronouns: She/Her Hers      Bladder History:  Feels bladder filling: Yes  Triggers for feeling of inability to wait to go to the bathroom: No    How long can you wait to urinate: A few hours  Gets up at night to urinate: Yes 1  Can stop the flow of urine when urinating: Yes  Volume of urine usually released: Average   Other issues:    Number of bladder infections in last 12 months:    Fluid intake per day: 128 12 0  Medications taken for bladder: No     Activities causing urine leak:      Amount of urine typically leaked:    Pads used to help with leaking:          Bowel History:  Frequency of bowel movement: 1x/day  Consistency of  stool: Hard    Ignores the urge to defecate: No  Other bowel issues: Pain when pooping; Loss of gas; Straining to have bowel movement  Length of time spent trying to have a bowel movement: 10 min    Sexual Function History:  Sexual orientation: Straight    Sexually active: Yes  Lubrication used: Yes Yes  Pelvic pain: Deep penetration (rectal or vaginal)    Pain or difficulty with orgasms/erection/ejaculation: No    State of menopause: Perimenopause (have not gone through menopause yet)  Hormone medications: Yes IUD Liletta    Are you currently pregnant: No, Number of previous pregnancies: 0, Number of deliveries: 0, Do you get regular exercise: Yes, I do this type of exercise: Weightlifting, aerobics, walking, Have you tried pelvic floor strengthening exercises for 4 weeks: No, Do you have any history of trauma that is relevant to your care that you d like to share: No    Discussed reason for referral regarding pelvic health needs and external/internal pelvic floor muscle examination with patient/guardian.  Opportunity provided to ask questions and verbal consent for assessment and intervention was given.    PAIN: Pain is Exacerbated By: certain positions with penetration and with constipation with type 1 or 2 stool. Relief with Mg Citrate and Miralax  POSTURE: WNL  LUMBAR SCREEN: AROM WNL  HIP SCREEN:  Strength: WNL   Functional Strength Testing:     PELVIC/SI SCREEN:     PAIN PROVOCATION TEST:   PELVIS/SI SPECIAL TESTS:   BREATHING SYMMETRY: Decreased rib cage mobility    PELVIC EXAM  External Visual Inspection:  With voluntary pelvic floor contraction: Perineal elevation  Relaxation of PFM: Partial/delayed relaxation    Integumentary:   Introitus: Tight    External Digital Palpation per Perineum:   Bulbo cavernosis: Tightness, Tenderness    Scar:   Location/Type:   Mobility:     Internal Digital Palpation:  Per Vagina:  Tenderness  Digital Muscle Performance: P (Power): 4  Relaxation Post-Contraction:  Partial/delayed relaxation    Per Rectum:        Pelvic Organ Prolapse:       ABDOMINAL ASSESSMENT  Diastasis Rectus Abdominis (YARED):      Abdominal Activation/Strength:       BIOFEEDBACK:  Position: Supine  Surface Electrodes: Perineal    Abdominals: Paradoxical with pelvic floor    Perianals:   Baseline muscle activity: 1 microvolts  Duration of Sustained Contraction: 4-5 seconds  Subjective Assessment: Time to return to baseline muscle activity: Slow de-recruitment    DERMATOMES: WNL  DTR S:     Assessment & Plan   CLINICAL IMPRESSIONS   Medical Diagnosis:      Treatment Diagnosis:     Impression/Assessment: Patient is a 33 year old female with constipation and dyspareunia complaints.  The following significant findings have been identified: Pain, Decreased ROM/flexibility, Decreased strength and Impaired muscle performance. These impairments interfere with their ability to perform self care tasks as compared to previous level of function.     Clinical Decision Making (Complexity):   Clinical Presentation: Stable/Uncomplicated  Clinical Presentation Rationale: based on medical and personal factors listed in PT evaluation  Clinical Decision Making (Complexity): Low complexity    PLAN OF CARE  Treatment Interventions:  Modalities: Biofeedback  Interventions: Manual Therapy, Therapeutic Activity, Therapeutic Exercise    Long Term Goals            Frequency of Treatment:    Duration of Treatment:      Recommended Referrals to Other Professionals:   Education Assessment:        Risks and benefits of evaluation/treatment have been explained.   Patient/Family/caregiver agrees with Plan of Care.     Evaluation Time:            Signing Clinician: An Kunz, PT

## 2023-07-06 ENCOUNTER — THERAPY VISIT (OUTPATIENT)
Dept: PHYSICAL THERAPY | Facility: CLINIC | Age: 33
End: 2023-07-06
Attending: FAMILY MEDICINE
Payer: COMMERCIAL

## 2023-07-06 DIAGNOSIS — N94.10 DYSPAREUNIA IN FEMALE: Primary | ICD-10-CM

## 2023-07-06 DIAGNOSIS — M99.05 SOMATIC DYSFUNCTION OF PELVIS REGION: ICD-10-CM

## 2023-07-06 PROCEDURE — 97530 THERAPEUTIC ACTIVITIES: CPT | Mod: GP | Performed by: PHYSICAL THERAPIST

## 2023-07-06 PROCEDURE — 97110 THERAPEUTIC EXERCISES: CPT | Mod: GP | Performed by: PHYSICAL THERAPIST

## 2023-07-13 ENCOUNTER — THERAPY VISIT (OUTPATIENT)
Dept: PHYSICAL THERAPY | Facility: CLINIC | Age: 33
End: 2023-07-13
Attending: FAMILY MEDICINE
Payer: COMMERCIAL

## 2023-07-13 DIAGNOSIS — M99.05 SOMATIC DYSFUNCTION OF PELVIS REGION: ICD-10-CM

## 2023-07-13 DIAGNOSIS — N94.10 DYSPAREUNIA IN FEMALE: Primary | ICD-10-CM

## 2023-07-13 PROCEDURE — 97140 MANUAL THERAPY 1/> REGIONS: CPT | Mod: GP | Performed by: PHYSICAL THERAPIST

## 2023-07-13 PROCEDURE — 97530 THERAPEUTIC ACTIVITIES: CPT | Mod: GP | Performed by: PHYSICAL THERAPIST

## 2023-07-13 NOTE — PROGRESS NOTES
DISCHARGE  Reason for Discharge: Patient ready to continue on her own     Equipment Issued: none    Discharge Plan: Patient to continue home program.   07/13/23 0500   Appointment Info   Signing clinician's name / credentials An Liang ATC   Total/Authorized Visits E+T(6)   Visits Used 2   Medical Diagnosis constipation/dyspareuna   PT Tx Diagnosis constpation/dyspareuna   Other pertinent information works at [employer], also teaches fitness, does weight lifting   Progress Note/Certification   Onset of illness/injury or Date of Surgery   (2020)   Therapy Frequency 2 times a month   Predicted Duration 3 months   GOALS   PT Goals 2   PT Goal 1   Goal Identifier pain with bowel movements/hemorrhoids   Goal Description no pain wtih bowel movements and stool on the 3-4 [place] scale to help with ease of bowel movements   Rationale to maximize safety and independence with performance of ADLs and functional tasks   Goal Progress No pain over the last few weeks and no hemorrhoids   Target Date 08/30/23   PT Goal 2   Goal Identifier pain with intercourse   Goal Description able to have intercourse without pain   Rationale to maximize safety and independence with performance of ADLs and functional tasks   Goal Progress has not tried intercourse but we have discussed self massage and tools to help with self stretch   Target Date 09/29/23   Subjective Report   Subjective Report Occassional times where she feels the urge at home and feels relaxed and can have a bowel movement.  If out and about this can be more difficult and needs to push. Has not had any bleeding and has not tried any penetration vaginally.   Objective Measures   Objective Measures Objective Measure 1;Objective Measure 2   Objective Measure 1   Objective Measure pelvic muscle relaxation/awareness   Details better relaxation noted on biofeedfack   Objective Measure 2   Objective Measure pain with palpation   Details No significant pain today, Mild  tightness at introitus and over OI.   Treatment Interventions (PT)   Interventions Therapeutic Activity;Manual Therapy;Therapeutic Procedure/Exercise   Therapeutic Procedure/Exercise   Therapeutic Procedures: strength, endurance, ROM, flexibillity minutes (86001) 5   PTRx Ther Proc 1 Pelvic Floor Muscle Strengthening Basic   PTRx Ther Proc 1 - Details 10 reps for 5 sec with contract and relax   Therapeutic Activity   Therapeutic Activities: dynamic activities to improve functional performance minutes (27423) 13   PTRx Ther Act 1 Education Sheet General   PTRx Ther Act 1 - Details Discussed Oh-nut,[employer] pelvic crystal wandIntimate RoseHope and hers   PTRx Ther Act 2 Relaxed Awareness of Pelvic Floor Handout   PTRx Ther Act 2 - Details continue to focus on this with contract and relax of the pelvic muscles and palpation anally and vaginally   PTRx Ther Act 10 Proper Defecation Techniques   PTRx Ther Act 10 - Details reviewed   Manual Therapy   Manual Therapy 2 pelvic myofascial release   Manual Therapy 2 - Details instruct in MFR to introitus and deeper to OI with instruction on how to use pelvic wand or dilators if pain occurs and to discuss use of estrogen if lubrication is not helping   Manual Therapy: Mobilization, MFR, MLD, friction massage minutes (49446) 10   Manual Therapy Manual Therapy 2   Manual Therapy 1 abdominal colon motility   Manual Therapy 1 - Details reviewed technique   Education   Learner/Method Patient;Listening;Reading;Demonstration;Pictures/Video;No Barriers to Learning   Plan   Home program see ptrx   Updates to plan of care discussed what to continue to work on at home   Plan for next session DC today   Total Session Time   Timed Code Treatment Minutes 28   Total Treatment Time (sum of timed and untimed services) 28         Referring Provider:  Tati Gruber

## 2023-12-04 DIAGNOSIS — Z79.899 ENCOUNTER FOR EYE EXAM DUE TO HIGH RISK MEDICATION: Primary | ICD-10-CM

## 2023-12-04 NOTE — PROGRESS NOTES
HPI:  Patient presents for monitoring of high risk medication - retin-A which was started in 03/19/2021. Vision has been more blurry. She would like new glasses.       Pertinent Medical History:  Irritable bowl syndrome  Chlamydia    Ocular History:  LASIK, both eyes. 2013  Dry Eye Syndrome, both eyes.   Possible family history of glaucoma - maybe one of the grandparents.   Chalazradha REINIER 11/27/2018  Myopia, both eyes.     Eye Medications:  Retin-A (started on 03/19/2021)    Assessment and Plan:  #   High Risk Medication - Using Retin-A since 03/19/2021  Visual field 12/19/2023: Both eyes: Generalized defects (likely due to test taking challenges vs dry eyes)  Optic Nerve OCT 11/15/2022: Both eyes: no edema.  There is no papilledema seen on exam.  Deferred repeat VF after treating dry eyes.   Recommend annual dilated eye exam with visual field and optic nerve OCT.     #   H/O LASIK, both eyes.   Patient had a consultation - can do PRK and not repeat LASIK. Patient is thinking about it.     #   Keratitis Sicca/Meibomian Gland Dysfunction, both eyes.   Contributing to intermittent redness.   Continue systane 4 times daily both eyes. Refresh or systane with omega 3/flaxseed oil. Can use up to every 2 hours both eyes as needed.   Continue fish oil - currently take 1 gram daily - 2 gram daily is therapeutic.   Continue warm compress daily.   Patient is very bothered by dry eyes despite of current treatments.   Discussed Xiidra, restasis, and serum tears.   Start restasis 2 times daily, both eyes. Start 12/19/2023.   Start FML 4 times daily for 10 days only, both eyes. Start 12/19/2023.   Dry eye follow up in 3 months    #   Myopia, both eyes.   Distance only glasses as needed. Monitor.     #   Small Peripheral Chorioretinal Scar, left eye.   Stable - monitor.         Patient consented to a dilated eye exam:  Yes. Side effects discussed.    Medical History:  Past Medical History:   Diagnosis Date    Abnormal Pap smear of  cervix 02/28/2018 02/28/18: see problem list.    Cervical high risk HPV (human papillomavirus) test positive 03/05/2019 03/05/19: See problem list.     Depressive disorder 2021       Medications:  Current Outpatient Medications   Medication Sig Dispense Refill    Cholecalciferol (VITAMIN D-3 PO) Take 125 mcg by mouth daily      clindamycin (CLINDAMAX) 1 % external gel Apply 1 Application topically as needed      Cyanocobalamin (B-12 PO) Take 1 tablet by mouth daily      hydrocortisone 2.5 % cream Apply 1 Application topically as needed      levonorgestrel (MIRENA) 20 MCG/DAY IUD 1 each by Intrauterine route once (Liletta IUD - placed at [facility] in 11/21)      Magnesium 300 MG CAPS Take 1 tablet by mouth daily      Omega-3 Fatty Acids (FISH OIL) 1200 MG capsule Take 1,200 mg by mouth daily      ondansetron (ZOFRAN) 4 MG tablet 1-2 tablets q6-8 hours prn nausea 15 tablet 0    Probiotic Product (PROBIOTIC DAILY PO)       spironolactone (ALDACTONE) 50 MG tablet Take 50 mg by mouth daily Through dermatology      tretinoin (RETIN-A) 0.01 % external gel Apply topically At Bedtime     Complete documentation of historical and exam elements from today's encounter can be found in the full encounter summary report (not reduplicated in this progress note). I personally obtained the chief complaint(s) and history of present illness.  I confirmed and edited as necessary the review of systems, past medical/surgical history, family history, social history, and examination findings as documented by others; and I examined the patient myself. I personally reviewed the relevant tests, images, and reports as documented above. I formulated and edited as necessary the assessment and plan and discussed the findings and management plan with the patient and family. - Jerome Pleitez OD

## 2023-12-14 ENCOUNTER — MYC MEDICAL ADVICE (OUTPATIENT)
Dept: FAMILY MEDICINE | Facility: CLINIC | Age: 33
End: 2023-12-14
Payer: COMMERCIAL

## 2023-12-19 ENCOUNTER — OFFICE VISIT (OUTPATIENT)
Dept: OPHTHALMOLOGY | Facility: CLINIC | Age: 33
End: 2023-12-19
Attending: OPTOMETRIST
Payer: COMMERCIAL

## 2023-12-19 DIAGNOSIS — H31.092 PERIPHERAL CHORIORETINAL SCARS OF LEFT EYE: ICD-10-CM

## 2023-12-19 DIAGNOSIS — Z79.899 ENCOUNTER FOR EYE EXAM DUE TO HIGH RISK MEDICATION: ICD-10-CM

## 2023-12-19 DIAGNOSIS — Z98.890 HX OF LASIK: Primary | ICD-10-CM

## 2023-12-19 DIAGNOSIS — H04.123 DRY EYE SYNDROME OF BOTH EYES: ICD-10-CM

## 2023-12-19 PROCEDURE — 92014 COMPRE OPH EXAM EST PT 1/>: CPT | Performed by: OPTOMETRIST

## 2023-12-19 PROCEDURE — 92133 CPTRZD OPH DX IMG PST SGM ON: CPT | Performed by: OPTOMETRIST

## 2023-12-19 PROCEDURE — 92083 EXTENDED VISUAL FIELD XM: CPT | Performed by: OPTOMETRIST

## 2023-12-19 PROCEDURE — 99214 OFFICE O/P EST MOD 30 MIN: CPT | Performed by: OPTOMETRIST

## 2023-12-19 RX ORDER — FLUOROMETHOLONE 0.1 %
1 SUSPENSION, DROPS(FINAL DOSAGE FORM)(ML) OPHTHALMIC (EYE) 4 TIMES DAILY
Qty: 10 ML | Refills: 0 | Status: SHIPPED | OUTPATIENT
Start: 2023-12-19 | End: 2023-12-29

## 2023-12-19 RX ORDER — CYCLOSPORINE 0.5 MG/ML
1 EMULSION OPHTHALMIC 2 TIMES DAILY
Qty: 1 EACH | Refills: 11 | Status: SHIPPED | OUTPATIENT
Start: 2023-12-19 | End: 2024-07-16

## 2023-12-19 ASSESSMENT — CUP TO DISC RATIO
OS_RATIO: 0.2
OD_RATIO: 0.15

## 2023-12-19 ASSESSMENT — PATIENT HEALTH QUESTIONNAIRE - PHQ9: SUM OF ALL RESPONSES TO PHQ QUESTIONS 1-9: 2

## 2023-12-19 ASSESSMENT — CONF VISUAL FIELD
OS_INFERIOR_NASAL_RESTRICTION: 0
OD_NORMAL: 1
METHOD: COUNTING FINGERS
OS_INFERIOR_TEMPORAL_RESTRICTION: 0
OD_INFERIOR_NASAL_RESTRICTION: 0
OD_INFERIOR_TEMPORAL_RESTRICTION: 0
OD_SUPERIOR_NASAL_RESTRICTION: 0
OS_NORMAL: 1
OS_SUPERIOR_TEMPORAL_RESTRICTION: 0
OD_SUPERIOR_TEMPORAL_RESTRICTION: 0
OS_SUPERIOR_NASAL_RESTRICTION: 0

## 2023-12-19 ASSESSMENT — EXTERNAL EXAM - LEFT EYE: OS_EXAM: NORMAL

## 2023-12-19 ASSESSMENT — REFRACTION_WEARINGRX
OS_CYLINDER: SPHERE
OS_SPHERE: -1.25
OD_SPHERE: -1.50
OD_AXIS: 160
OD_CYLINDER: +0.50
SPECS_TYPE: SVL

## 2023-12-19 ASSESSMENT — VISUAL ACUITY
OD_CC+: -1
OS_CC+: -1
OD_CC: 20/20
METHOD: SNELLEN - LINEAR
CORRECTION_TYPE: GLASSES
OS_CC: 20/20

## 2023-12-19 ASSESSMENT — SLIT LAMP EXAM - LIDS
COMMENTS: MEIBOMIAN GLAND DYSFUNCTION
COMMENTS: MEIBOMIAN GLAND DYSFUNCTION

## 2023-12-19 ASSESSMENT — TONOMETRY
OD_IOP_MMHG: 14
OS_IOP_MMHG: 13
IOP_METHOD: TONOPEN

## 2023-12-19 ASSESSMENT — EXTERNAL EXAM - RIGHT EYE: OD_EXAM: NORMAL

## 2023-12-19 NOTE — NURSING NOTE
Chief Complaints and History of Present Illnesses   Patient presents with    Follow Tx Of High Risk Med     Chief Complaint(s) and History of Present Illness(es)       Follow Tx Of High Risk Med              Laterality: both eyes    Associated symptoms: dryness.  Negative for double vision and photophobia    Treatments tried: artificial tears    Pain scale: 0/10              Comments    Pt reports no noticeable  changes or concerns in vision since last seen, appears stable w/correction.   Increase in dryness sensation over the last year, uses PF Systane 2-4 x's daily. BE    Александр Sesay OA, December 19, 2023

## 2023-12-31 NOTE — PROGRESS NOTES
HPI:  Patient presents for monitoring of high risk medication - retin-A which was started in 03/19/2021. Vision has been more blurry. She is interested in glasses for driving. Patient complains of occasionally redness.       Pertinent Medical History:    Irritable bowl syndrome    Chlamydia    Ocular History:    LASIK, both eyes. 2013    Dry Eye Syndrome, both eyes.     Possible family history of glaucoma - maybe one of the grandparents.     Chalazion REINIER 11/27/2018    Eye Medications:    Retin-A (started on 03/19/2021)    Assessment and Plan:  1.   High Risk Medication - Using Retin-A since 03/19/2021    Visual field 12/22/2021: Both eyes: No visual field defects.    Optic Nerve OCT 12/22/2021: Both eyes: no edema.    There is no papilledema seen on exam.    Recommend annual dilated eye exam with visual field and optic nerve OCT.     2.   H/O LASIK, both eyes.     Monitor.     3.   Dry Eye Syndrome, both eyes.     Contributing to intermittent redness.     Continue systane 4 times daily both eyes.     4.   Myopia, both eyes.     Distance only glasses as needed.         Patient consented to a dilated eye exam:    Yes. Side effects discussed.    Medical History:  Past Medical History:   Diagnosis Date     Abnormal Pap smear of cervix 02/28/2018 02/28/18: see problem list.     Cervical high risk HPV (human papillomavirus) test positive 03/05/2019 03/05/19: See problem list.        Medications:  Current Outpatient Medications   Medication Sig Dispense Refill     Biotin 1 MG CAPS        CALCIUM-VITAMIN D PO Take 600 mg by mouth        clindamycin (CLINDAMAX) 1 % external gel        Cyanocobalamin (B-12 PO)        hydrocortisone 2.5 % cream        levonorgestrel (MIRENA, 52 MG,) 20 MCG/24HR IUD 1 each by Intrauterine route once Inserted 10/2016       Omega-3 Fatty Acids (FISH OIL) 1200 MG capsule Take 1,200 mg by mouth daily       Probiotic Product (PROBIOTIC DAILY PO)        spironolactone (ALDACTONE) 50 MG tablet  Wound team note:   Pt seen for placement of BMS. MD order verified. Pt assessed, noted to be incontinent of loose brown stool. Sacrum/buttocks pink and intact. Sphincter tone determined to be adequate. BMS placed without difficulty, 42 mL of tap water placed in retention balloon, irrigated with 60 mL warm tap water. Confirmed irrigant/stool output in tube. Nursing to irrigate q shift with 60 mL-120 mL warm tap water or until patent.     Take 50 mg by mouth daily Through dermatology       tretinoin (RETIN-A) 0.01 % external gel Apply topically At Bedtime       triamcinolone (KENALOG) 0.1 % ointment Apply sparingly to affected area three times daily for 14 days. 30 g 0   Complete documentation of historical and exam elements from today's encounter can be found in the full encounter summary report (not reduplicated in this progress note). I personally obtained the chief complaint(s) and history of present illness.  I confirmed and edited as necessary the review of systems, past medical/surgical history, family history, social history, and examination findings as documented by others; and I examined the patient myself. I personally reviewed the relevant tests, images, and reports as documented above. I formulated and edited as necessary the assessment and plan and discussed the findings and management plan with the patient and family. - Jerome Pleitez OD

## 2024-02-20 DIAGNOSIS — Z79.899 ENCOUNTER FOR EYE EXAM DUE TO HIGH RISK MEDICATION: Primary | ICD-10-CM

## 2024-03-20 NOTE — PROGRESS NOTES
HPI:  Patient presents for dry eye follow up. Restasis helps. Two months after using restasis - patient develops a rash and is wondering if it is related - saw an dermatologist as well and does not think the rash is related to restasis. The rash is likely due to eczema.       Pertinent Medical History:  Irritable bowl syndrome  Chlamydia    Ocular History:  LASIK, both eyes. 2013  Dry Eye Syndrome, both eyes.   Possible family history of glaucoma - maybe one of the grandparents.   Chalazion REINIER 11/27/2018  Myopia, both eyes.   Small peripheral chorioretinal scar, left eye.     Eye Medications:  Retin-A (started on 03/19/2021)  Restasis both eyes.     Assessment and Plan:  #   Keratitis Sicca/Meibomian Gland Dysfunction, both eyes.   Contributing to intermittent redness.   Preservative free artificial tears 4 times daily both eyes. Refresh or systane with omega 3/flaxseed oil. Can use up to every 2 hours both eyes as needed.   Fish oil/omega 3 - 2 gram per oral daily.   Warm compress (with dry eye mask) at bedtime, both eyes.   Restasis 2 times daily, both eyes. Started 12/19/2023.   Two months after using restasis - patient develops a rash and is wondering if it is related to restasis. Patient also saw a dermatologist as well who not think the rash is related to restasis. The rash is likely due tp eczema (per patient).There is also rash on the eyelids as well as the body. Patient has an appointment with an allergist on 10/2024. My recommendation is to discontinue Restasis for one month to see if the skin condition improves in case there is an allergy issue with restasis. After discussion, patient would still like to continue restasis. Patient understands the risks, benefits, and my concerns for an allergic reaction related to restasis. Given her dermatologist does not think the skin condition is allergies related, patient feels comfortable continuing restasis until she sees an allergist.   Discussed Xiidra and serum  tears previously.   Discussed follow up in 1 month - patient defers for now. Patient will call if there are any issues. Patient will also call if she decides to stop restasis. Can try Xiidra.     #   High Risk Medication - Using Retin-A since 03/19/2021  Visual field 12/19/2023: Both eyes: Generalized defects (likely due to test taking challenges vs dry eyes)  Optic Nerve OCT 11/15/2022: Both eyes: no edema.  There is no papilledema seen on exam.  Deferred repeat VF.  Recommend annual dilated eye exam with visual field and optic nerve OCT.     #   H/O LASIK, both eyes.   Patient had a consultation - can do PRK and not repeat LASIK. Patient is thinking about it.     #   Myopia, both eyes.   Distance only glasses as needed. Monitor.     #   Small Peripheral Chorioretinal Scar, left eye.   Stable - monitor.         Patient consented to a dilated eye exam:  No.     Medical History:  Past Medical History:   Diagnosis Date    Abnormal Pap smear of cervix 02/28/2018 02/28/18: see problem list.    Cervical high risk HPV (human papillomavirus) test positive 03/05/2019 03/05/19: See problem list.     Depressive disorder 2021       Medications:  Current Outpatient Medications   Medication Sig Dispense Refill    Cholecalciferol (VITAMIN D-3 PO) Take 125 mcg by mouth daily      clindamycin (CLINDAMAX) 1 % external gel Apply 1 Application topically as needed      Cyanocobalamin (B-12 PO) Take 1 tablet by mouth daily      cycloSPORINE (RESTASIS) 0.05 % ophthalmic emulsion Place 1 drop into both eyes 2 times daily 1 each 11    hydrocortisone 2.5 % cream Apply 1 Application topically as needed      levonorgestrel (MIRENA) 20 MCG/DAY IUD 1 each by Intrauterine route once (Liletta IUD - placed at [facility] in 11/21)      Magnesium 300 MG CAPS Take 1 tablet by mouth daily      Omega-3 Fatty Acids (FISH OIL) 1200 MG capsule Take 1,200 mg by mouth daily      ondansetron (ZOFRAN) 4 MG tablet 1-2 tablets q6-8 hours  prn nausea 15 tablet 0    Probiotic Product (PROBIOTIC DAILY PO)       spironolactone (ALDACTONE) 50 MG tablet Take 50 mg by mouth daily Through dermatology      tretinoin (RETIN-A) 0.01 % external gel Apply topically At Bedtime     Complete documentation of historical and exam elements from today's encounter can be found in the full encounter summary report (not reduplicated in this progress note). I personally obtained the chief complaint(s) and history of present illness.  I confirmed and edited as necessary the review of systems, past medical/surgical history, family history, social history, and examination findings as documented by others; and I examined the patient myself. I personally reviewed the relevant tests, images, and reports as documented above. I formulated and edited as necessary the assessment and plan and discussed the findings and management plan with the patient and family. - Jerome Pleitez OD

## 2024-04-02 ENCOUNTER — OFFICE VISIT (OUTPATIENT)
Dept: OPHTHALMOLOGY | Facility: CLINIC | Age: 34
End: 2024-04-02
Attending: OPTOMETRIST
Payer: COMMERCIAL

## 2024-04-02 DIAGNOSIS — H04.123 DRY EYE SYNDROME OF BOTH EYES: Primary | ICD-10-CM

## 2024-04-02 DIAGNOSIS — Z98.890 HX OF LASIK: ICD-10-CM

## 2024-04-02 DIAGNOSIS — H16.223 KERATITIS SICCA, BOTH EYES: ICD-10-CM

## 2024-04-02 PROCEDURE — 99213 OFFICE O/P EST LOW 20 MIN: CPT | Performed by: OPTOMETRIST

## 2024-04-02 PROCEDURE — 99214 OFFICE O/P EST MOD 30 MIN: CPT | Performed by: OPTOMETRIST

## 2024-04-02 ASSESSMENT — EXTERNAL EXAM - RIGHT EYE: OD_EXAM: NORMAL

## 2024-04-02 ASSESSMENT — TONOMETRY
OS_IOP_MMHG: 13
OD_IOP_MMHG: 12
IOP_METHOD: ICARE

## 2024-04-02 ASSESSMENT — CONF VISUAL FIELD
OD_SUPERIOR_NASAL_RESTRICTION: 0
METHOD: COUNTING FINGERS
OS_SUPERIOR_TEMPORAL_RESTRICTION: 0
OD_NORMAL: 1
OS_SUPERIOR_NASAL_RESTRICTION: 0
OS_INFERIOR_NASAL_RESTRICTION: 0
OD_SUPERIOR_TEMPORAL_RESTRICTION: 0
OD_INFERIOR_TEMPORAL_RESTRICTION: 0
OS_NORMAL: 1
OD_INFERIOR_NASAL_RESTRICTION: 0
OS_INFERIOR_TEMPORAL_RESTRICTION: 0

## 2024-04-02 ASSESSMENT — VISUAL ACUITY
OS_SC: 20/70
OD_PH_SC: 20/20
OD_SC: 20/70
OD_PH_SC+: -3
OS_PH_SC+: -2
METHOD: SNELLEN - LINEAR
OS_PH_SC: 20/30

## 2024-04-02 ASSESSMENT — SLIT LAMP EXAM - LIDS
COMMENTS: MEIBOMIAN GLAND DYSFUNCTION
COMMENTS: MEIBOMIAN GLAND DYSFUNCTION

## 2024-04-02 ASSESSMENT — EXTERNAL EXAM - LEFT EYE: OS_EXAM: NORMAL

## 2024-04-02 ASSESSMENT — CUP TO DISC RATIO
OS_RATIO: 0.2
OD_RATIO: 0.15

## 2024-04-02 NOTE — NURSING NOTE
Chief Complaints and History of Present Illnesses   Patient presents with    Dry Eye(s) Follow-Up     Chief Complaint(s) and History of Present Illness(es)       Dry Eye(s) Follow-Up              Laterality: both eyes    Associated signs and symptoms: Negative for eye pain, irritation and redness              Comments    Bess is here to continue care for dry eyes. She says she has been using Restasis and her eyes feel less dry.    Dane Rodriguez COT 7:58 AM April 2, 2024

## 2024-07-11 SDOH — HEALTH STABILITY: PHYSICAL HEALTH: ON AVERAGE, HOW MANY DAYS PER WEEK DO YOU ENGAGE IN MODERATE TO STRENUOUS EXERCISE (LIKE A BRISK WALK)?: 6 DAYS

## 2024-07-11 SDOH — HEALTH STABILITY: PHYSICAL HEALTH: ON AVERAGE, HOW MANY MINUTES DO YOU ENGAGE IN EXERCISE AT THIS LEVEL?: 50 MIN

## 2024-07-11 ASSESSMENT — SOCIAL DETERMINANTS OF HEALTH (SDOH): HOW OFTEN DO YOU GET TOGETHER WITH FRIENDS OR RELATIVES?: THREE TIMES A WEEK

## 2024-07-13 ENCOUNTER — HEALTH MAINTENANCE LETTER (OUTPATIENT)
Age: 34
End: 2024-07-13

## 2024-07-16 ENCOUNTER — OFFICE VISIT (OUTPATIENT)
Dept: FAMILY MEDICINE | Facility: CLINIC | Age: 34
End: 2024-07-16
Payer: COMMERCIAL

## 2024-07-16 VITALS
DIASTOLIC BLOOD PRESSURE: 67 MMHG | HEART RATE: 60 BPM | OXYGEN SATURATION: 99 % | BODY MASS INDEX: 23.36 KG/M2 | SYSTOLIC BLOOD PRESSURE: 102 MMHG | RESPIRATION RATE: 16 BRPM | WEIGHT: 119 LBS | HEIGHT: 60 IN | TEMPERATURE: 98.2 F

## 2024-07-16 DIAGNOSIS — L71.0 PERIORAL DERMATITIS: ICD-10-CM

## 2024-07-16 DIAGNOSIS — Z80.6 FAMILY HISTORY OF CLL (CHRONIC LYMPHOID LEUKEMIA): ICD-10-CM

## 2024-07-16 DIAGNOSIS — L30.9 ECZEMA, UNSPECIFIED TYPE: ICD-10-CM

## 2024-07-16 DIAGNOSIS — L70.0 ACNE VULGARIS: ICD-10-CM

## 2024-07-16 DIAGNOSIS — K59.09 OTHER CONSTIPATION: ICD-10-CM

## 2024-07-16 DIAGNOSIS — Z11.3 SCREEN FOR STD (SEXUALLY TRANSMITTED DISEASE): ICD-10-CM

## 2024-07-16 DIAGNOSIS — Z00.00 ROUTINE GENERAL MEDICAL EXAMINATION AT A HEALTH CARE FACILITY: Primary | ICD-10-CM

## 2024-07-16 LAB
ALBUMIN SERPL BCG-MCNC: 4.5 G/DL (ref 3.5–5.2)
ALP SERPL-CCNC: 59 U/L (ref 40–150)
ALT SERPL W P-5'-P-CCNC: 18 U/L (ref 0–50)
ANION GAP SERPL CALCULATED.3IONS-SCNC: 10 MMOL/L (ref 7–15)
AST SERPL W P-5'-P-CCNC: 34 U/L (ref 0–45)
BASOPHILS # BLD AUTO: 0 10E3/UL (ref 0–0.2)
BASOPHILS NFR BLD AUTO: 0 %
BILIRUB SERPL-MCNC: 0.5 MG/DL
BUN SERPL-MCNC: 16.8 MG/DL (ref 6–20)
C TRACH DNA SPEC QL NAA+PROBE: NEGATIVE
CALCIUM SERPL-MCNC: 9.5 MG/DL (ref 8.8–10.4)
CHLORIDE SERPL-SCNC: 102 MMOL/L (ref 98–107)
CHOLEST SERPL-MCNC: 179 MG/DL
CREAT SERPL-MCNC: 0.93 MG/DL (ref 0.51–0.95)
EGFRCR SERPLBLD CKD-EPI 2021: 83 ML/MIN/1.73M2
EOSINOPHIL # BLD AUTO: 0.1 10E3/UL (ref 0–0.7)
EOSINOPHIL NFR BLD AUTO: 2 %
ERYTHROCYTE [DISTWIDTH] IN BLOOD BY AUTOMATED COUNT: 11.5 % (ref 10–15)
FASTING STATUS PATIENT QL REPORTED: YES
FASTING STATUS PATIENT QL REPORTED: YES
GLUCOSE SERPL-MCNC: 86 MG/DL (ref 70–99)
HCO3 SERPL-SCNC: 25 MMOL/L (ref 22–29)
HCT VFR BLD AUTO: 42.5 % (ref 35–47)
HDLC SERPL-MCNC: 72 MG/DL
HGB BLD-MCNC: 14.7 G/DL (ref 11.7–15.7)
IMM GRANULOCYTES # BLD: 0 10E3/UL
IMM GRANULOCYTES NFR BLD: 0 %
LDLC SERPL CALC-MCNC: 96 MG/DL
LYMPHOCYTES # BLD AUTO: 2 10E3/UL (ref 0.8–5.3)
LYMPHOCYTES NFR BLD AUTO: 43 %
MAGNESIUM SERPL-MCNC: 2.1 MG/DL (ref 1.7–2.3)
MCH RBC QN AUTO: 31.7 PG (ref 26.5–33)
MCHC RBC AUTO-ENTMCNC: 34.6 G/DL (ref 31.5–36.5)
MCV RBC AUTO: 92 FL (ref 78–100)
MONOCYTES # BLD AUTO: 0.4 10E3/UL (ref 0–1.3)
MONOCYTES NFR BLD AUTO: 8 %
N GONORRHOEA DNA SPEC QL NAA+PROBE: NEGATIVE
NEUTROPHILS # BLD AUTO: 2.1 10E3/UL (ref 1.6–8.3)
NEUTROPHILS NFR BLD AUTO: 46 %
NONHDLC SERPL-MCNC: 107 MG/DL
PLATELET # BLD AUTO: 203 10E3/UL (ref 150–450)
POTASSIUM SERPL-SCNC: 4.2 MMOL/L (ref 3.4–5.3)
PROT SERPL-MCNC: 7 G/DL (ref 6.4–8.3)
RBC # BLD AUTO: 4.64 10E6/UL (ref 3.8–5.2)
SODIUM SERPL-SCNC: 137 MMOL/L (ref 135–145)
T PALLIDUM AB SER QL: NONREACTIVE
TRIGL SERPL-MCNC: 55 MG/DL
WBC # BLD AUTO: 4.6 10E3/UL (ref 4–11)

## 2024-07-16 PROCEDURE — 87591 N.GONORRHOEAE DNA AMP PROB: CPT | Performed by: FAMILY MEDICINE

## 2024-07-16 PROCEDURE — 99395 PREV VISIT EST AGE 18-39: CPT | Performed by: FAMILY MEDICINE

## 2024-07-16 PROCEDURE — 87389 HIV-1 AG W/HIV-1&-2 AB AG IA: CPT | Performed by: FAMILY MEDICINE

## 2024-07-16 PROCEDURE — 85025 COMPLETE CBC W/AUTO DIFF WBC: CPT | Performed by: FAMILY MEDICINE

## 2024-07-16 PROCEDURE — 87491 CHLMYD TRACH DNA AMP PROBE: CPT | Performed by: FAMILY MEDICINE

## 2024-07-16 PROCEDURE — 36415 COLL VENOUS BLD VENIPUNCTURE: CPT | Performed by: FAMILY MEDICINE

## 2024-07-16 PROCEDURE — 80061 LIPID PANEL: CPT | Performed by: FAMILY MEDICINE

## 2024-07-16 PROCEDURE — 83735 ASSAY OF MAGNESIUM: CPT | Performed by: FAMILY MEDICINE

## 2024-07-16 PROCEDURE — 86780 TREPONEMA PALLIDUM: CPT | Performed by: FAMILY MEDICINE

## 2024-07-16 PROCEDURE — 80053 COMPREHEN METABOLIC PANEL: CPT | Performed by: FAMILY MEDICINE

## 2024-07-16 ASSESSMENT — ANXIETY QUESTIONNAIRES
GAD7 TOTAL SCORE: 3
7. FEELING AFRAID AS IF SOMETHING AWFUL MIGHT HAPPEN: NOT AT ALL
7. FEELING AFRAID AS IF SOMETHING AWFUL MIGHT HAPPEN: NOT AT ALL
GAD7 TOTAL SCORE: 3
2. NOT BEING ABLE TO STOP OR CONTROL WORRYING: NOT AT ALL
1. FEELING NERVOUS, ANXIOUS, OR ON EDGE: SEVERAL DAYS
5. BEING SO RESTLESS THAT IT IS HARD TO SIT STILL: NOT AT ALL
6. BECOMING EASILY ANNOYED OR IRRITABLE: SEVERAL DAYS
IF YOU CHECKED OFF ANY PROBLEMS ON THIS QUESTIONNAIRE, HOW DIFFICULT HAVE THESE PROBLEMS MADE IT FOR YOU TO DO YOUR WORK, TAKE CARE OF THINGS AT HOME, OR GET ALONG WITH OTHER PEOPLE: NOT DIFFICULT AT ALL
3. WORRYING TOO MUCH ABOUT DIFFERENT THINGS: SEVERAL DAYS
GAD7 TOTAL SCORE: 3
8. IF YOU CHECKED OFF ANY PROBLEMS, HOW DIFFICULT HAVE THESE MADE IT FOR YOU TO DO YOUR WORK, TAKE CARE OF THINGS AT HOME, OR GET ALONG WITH OTHER PEOPLE?: NOT DIFFICULT AT ALL
4. TROUBLE RELAXING: NOT AT ALL

## 2024-07-16 ASSESSMENT — PATIENT HEALTH QUESTIONNAIRE - PHQ9
10. IF YOU CHECKED OFF ANY PROBLEMS, HOW DIFFICULT HAVE THESE PROBLEMS MADE IT FOR YOU TO DO YOUR WORK, TAKE CARE OF THINGS AT HOME, OR GET ALONG WITH OTHER PEOPLE: NOT DIFFICULT AT ALL
SUM OF ALL RESPONSES TO PHQ QUESTIONS 1-9: 2
SUM OF ALL RESPONSES TO PHQ QUESTIONS 1-9: 2

## 2024-07-16 NOTE — PROGRESS NOTES
Preventive Care Visit  [facility]  Tati Gruber MD, Family Medicine  Jul 16, 2024      Assessment & Plan     Routine general medical examination at a health care facility  Discussed diet, calcium and exercise.  Thin prep pap was not done.  Eye and dental care UTD or recommended f/u.  No immunizations needed today.  See orders below for tests ordered and screening needed.   - Lipid panel reflex to direct LDL Fasting; Future  - Lipid panel reflex to direct LDL Fasting    Other constipation  Improved -- has taken metamucil and miralax in past, will still sometimes use those.  Realized lately that she needed to add some fat to her mainly protein/fiber diet which helped, and also added magnesium citrate 400-500mg at night (though now weaning down to usually ~200mg at night).  - Magnesium; Future  - Magnesium    Perioral dermatitis  Difficult to treat, seeing derm.  On second doxy course.    Acne vulgaris/  Eczema, unspecified type  On spironolactone through derm, will check CMP, partly to check K.  - Comprehensive metabolic panel (BMP + Alb, Alk Phos, ALT, AST, Total. Bili, TP); Future  - Comprehensive metabolic panel (BMP + Alb, Alk Phos, ALT, AST, Total. Bili, TP)    Family history of CLL (chronic lymphoid leukemia)  - CBC with platelets and differential; Future  - CBC with platelets and differential    Screen for STD (sexually transmitted disease)  New partner, no sx's.  - NEISSERIA GONORRHOEA PCR  - CHLAMYDIA TRACHOMATIS PCR  - HIV Antigen Antibody Combo; Future  - Treponema Abs w Reflex to RPR and Titer; Future  - HIV Antigen Antibody Combo  - Treponema Abs w Reflex to RPR and Titer    Patient has been advised of split billing requirements and indicates understanding: Yes        Counseling  Appropriate preventive services were discussed with this patient, including applicable screening as appropriate for fall prevention, nutrition, physical activity, Tobacco-use cessation, weight loss  and cognition.  Checklist reviewing preventive services available has been given to the patient.  Reviewed patient's diet, addressing concerns and/or questions.   She is at risk for psychosocial distress and has been provided with information to reduce risk.                   Georgie Kellogg is a 34 year old, presenting for the following:  Physical        7/16/2024     7:21 AM   Additional Questions   Roomed by Lizzy BRITTON MA   Accompanied by self         7/16/2024     7:21 AM   Patient Reported Additional Medications   Patient reports taking the following new medications none        Health Care Directive  Patient does not have a Health Care Directive or Living Will: printed out for pt    HPI    Constipation- better lately.  Started 2-3 yrs ago.  Tried a bunch of things.  Tried metamucil for a long time, still sometimes.  Then tried miralax.  Did high protein, fiber, not enough fat.  Magnesium citrate- couple pills nightly (400-500mg) for awhile, now ~200mg.  May switch to glycinate or threonate.    Skins issues-  Eczema on her hands- cera-ve.  Perioral dermatitis- second round of doxy.  Still horrible itchy flares (eyes, oral and groin).  Working with derm- had been at [facility].  Now at [facility] - two PAs, closer.    On spironolactone- will check K    Fam h/o cancer- mom with CLL, and MGM with leukemia.          7/11/2024   General Health   How would you rate your overall physical health? Good   Feel stress (tense, anxious, or unable to sleep) Only a little      (!) STRESS CONCERN      7/11/2024   Nutrition   Three or more servings of calcium each day? Yes   Diet: Regular (no restrictions)   How many servings of fruit and vegetables per day? 4 or more   How many sweetened beverages each day? 0-1            7/11/2024   Exercise   Days per week of moderate/strenous exercise 6 days   Average minutes spent exercising at this level 50 min            7/11/2024   Social Factors   Frequency of gathering with friends or  relatives Three times a week   Worry food won't last until get money to buy more No   Food not last or not have enough money for food? No   Do you have housing? (Housing is defined as stable permanent housing and does not include staying ouside in a car, in a tent, in an abandoned building, in an overnight shelter, or couch-surfing.) Yes   Are you worried about losing your housing? No   Lack of transportation? No   Unable to get utilities (heat,electricity)? No            7/11/2024   Dental   Dentist two times every year? Yes            7/11/2024   TB Screening   Were you born outside of the [place]? No          Today's PHQ-9 Score:       7/16/2024     7:18 AM   PHQ-9 SCORE   PHQ-9 Total Score MyChart 2 (Minimal depression)   PHQ-9 Total Score 2         7/11/2024   Substance Use   Alcohol more than 3/day or more than 7/wk No   Do you use any other substances recreationally? (!) ALCOHOL        Social History     Tobacco Use    Smoking status: Never    Smokeless tobacco: Never   Vaping Use    Vaping status: Never Used   Substance Use Topics    Alcohol use: Yes     Comment: 1-2 times a week couple drinks     Drug use: No          Mammogram Screening - Patient under 40 years of age: Routine Mammogram Screening not recommended.         7/11/2024   STI Screening   New sexual partner(s) since last STI/HIV test? (!) YES         History of abnormal Pap smear: YES - reflected in Problem List and Health Maintenance accordingly        Latest Ref Rng & Units 5/24/2023    10:02 AM 5/4/2022     8:52 AM 3/19/2021     9:12 AM   PAP / HPV   PAP  Negative for Intraepithelial Lesion or Malignancy (NILM)  Negative for Intraepithelial Lesion or Malignancy (NILM)     HPV 16 DNA Negative Negative  Negative  Negative    HPV 18 DNA Negative Negative  Negative  Negative    Other HR HPV Negative Negative  Positive  Positive            7/11/2024   Contraception/Family Planning   Questions about contraception or family planning No           Reviewed  and updated as needed this visit by Provider   Tobacco  Allergies  Meds  Problems  Med Hx  Surg Hx  Fam Hx            Lab work is in process  Labs reviewed in EPIC  BP Readings from Last 3 Encounters:   07/16/24 102/67   05/24/23 90/64   01/06/23 101/60    Wt Readings from Last 3 Encounters:   07/16/24 54 kg (119 lb)   05/24/23 53 kg (116 lb 12.8 oz)   01/06/23 54.9 kg (121 lb)                      Review of Systems  Constitutional, neuro, ENT, endocrine, pulmonary, cardiac, gastrointestinal, genitourinary, musculoskeletal, integument and psychiatric systems are negative, except as otherwise noted.     Objective    Exam  /67   Pulse 60   Temp 98.2  F (36.8  C) (Temporal)   Resp 16   Ht 1.524 m (5')   Wt 54 kg (119 lb)   SpO2 99%   BMI 23.24 kg/m     Estimated body mass index is 23.24 kg/m  as calculated from the following:    Height as of this encounter: 1.524 m (5').    Weight as of this encounter: 54 kg (119 lb).    Physical Exam  GENERAL: alert and no distress  EYES: Eyes grossly normal to inspection, PERRL and conjunctivae and sclerae normal  HENT: ear canals and TM's normal, nose and mouth without ulcers or lesions  NECK: no adenopathy, no asymmetry, masses, or scars  RESP: lungs clear to auscultation - no rales, rhonchi or wheezes  BREAST: normal without masses, tenderness or nipple discharge and no palpable axillary masses or adenopathy  CV: regular rate and rhythm, normal S1 S2, no S3 or S4, no murmur, click or rub, no peripheral edema  ABDOMEN: soft, nontender, no hepatosplenomegaly, no masses and bowel sounds normal  MS: no gross musculoskeletal defects noted, no edema  SKIN: no suspicious lesions or rashes  NEURO: Normal strength and tone, mentation intact and speech normal  PSYCH: mentation appears normal, affect normal/bright        Signed Electronically by: Tati Gruber MD    Answers submitted by the patient for this visit:  Patient Health Questionnaire (Submitted on  7/16/2024)  If you checked off any problems, how difficult have these problems made it for you to do your work, take care of things at home, or get along with other people?: Not difficult at all  PHQ9 TOTAL SCORE: 2  LUIS-7 (Submitted on 7/16/2024)  LUIS 7 TOTAL SCORE: 3

## 2024-07-16 NOTE — PATIENT INSTRUCTIONS
Patient Education   Preventive Care Advice   This is general advice given by our system to help you stay healthy. However, your care team may have specific advice just for you. Please talk to your care team about your preventive care needs.  Nutrition  Eat 5 or more servings of fruits and vegetables each day.  Try wheat bread, brown rice and whole grain pasta (instead of white bread, rice, and pasta).  Get enough calcium and vitamin D. Check the label on foods and aim for 100% of the RDA (recommended daily allowance).  Lifestyle  Exercise at least 150 minutes each week  (30 minutes a day, 5 days a week).  Do muscle strengthening activities 2 days a week. These help control your weight and prevent disease.  No smoking.  Wear sunscreen to prevent skin cancer.  Have a dental exam and cleaning every 6 months.  Yearly exams  See your health care team every year to talk about:  Any changes in your health.  Any medicines your care team has prescribed.  Preventive care, family planning, and ways to prevent chronic diseases.  Shots (vaccines)   HPV shots (up to age 26), if you've never had them before.  Hepatitis B shots (up to age 59), if you've never had them before.  COVID-19 shot: Get this shot when it's due.  Flu shot: Get a flu shot every year.  Tetanus shot: Get a tetanus shot every 10 years.  Pneumococcal, hepatitis A, and RSV shots: Ask your care team if you need these based on your risk.  Shingles shot (for age 50 and up)  General health tests  Diabetes screening:  Starting at age 35, Get screened for diabetes at least every 3 years.  If you are younger than age 35, ask your care team if you should be screened for diabetes.  Cholesterol test: At age 39, start having a cholesterol test every 5 years, or more often if advised.  Bone density scan (DEXA): At age 50, ask your care team if you should have this scan for osteoporosis (brittle bones).  Hepatitis C: Get tested at least once in your life.  STIs (sexually  transmitted infections)  Before age 24: Ask your care team if you should be screened for STIs.  After age 24: Get screened for STIs if you're at risk. You are at risk for STIs (including HIV) if:  You are sexually active with more than one person.  You don't use condoms every time.  You or a partner was diagnosed with a sexually transmitted infection.  If you are at risk for HIV, ask about PrEP medicine to prevent HIV.  Get tested for HIV at least once in your life, whether you are at risk for HIV or not.  Cancer screening tests  Cervical cancer screening: If you have a cervix, begin getting regular cervical cancer screening tests starting at age 21.  Breast cancer scan (mammogram): If you've ever had breasts, begin having regular mammograms starting at age 40. This is a scan to check for breast cancer.  Colon cancer screening: It is important to start screening for colon cancer at age 45.  Have a colonoscopy test every 10 years (or more often if you're at risk) Or, ask your provider about stool tests like a FIT test every year or Cologuard test every 3 years.  To learn more about your testing options, visit:   .  For help making a decision, visit:   https://bit.ly/xd74411.  Prostate cancer screening test: If you have a prostate, ask your care team if a prostate cancer screening test (PSA) at age 55 is right for you.  Lung cancer screening: If you are a current or former smoker ages 50 to 80, ask your care team if ongoing lung cancer screenings are right for you.  For informational purposes only. Not to replace the advice of your health care provider. Copyright   2023 [facility]. All rights reserved. Clinically reviewed by the [facility] Transitions Program. Cmed [id number] - REV 01/24.  Substance Use Disorder: Care Instructions  Overview     You can improve your life and health by stopping your use of alcohol or drugs. When you don't drink or use drugs, you may feel and sleep better. You may  get along better with your family, friends, and coworkers. There are medicines and programs that can help with substance use disorder.  How can you care for yourself at home?  Here are some ways to help you stay sober and prevent relapse.  If you have been given medicine to help keep you sober or reduce your cravings, be sure to take it exactly as prescribed.  Talk to your doctor about programs that can help you stop using drugs or drinking alcohol.  Do not keep alcohol or drugs in your home.  Plan ahead. Think about what you'll say if other people ask you to drink or use drugs. Try not to spend time with people who drink or use drugs.  Use the time and money spent on drinking or drugs to do something that's important to you.  Preventing a relapse  Have a plan to deal with relapse. Learn to recognize changes in your thinking that lead you to drink or use drugs. Get help before you start to drink or use drugs again.  Try to stay away from situations, friends, or places that may lead you to drink or use drugs.  If you feel the need to drink alcohol or use drugs again, seek help right away. Call a trusted friend or family member. Some people get support from organizations such as Narcotics Anonymous or What's Trending or from treatment facilities.  If you relapse, get help as soon as you can. Some people make a plan with another person that outlines what they want that person to do for them if they relapse. The plan usually includes how to handle the relapse and who to notify in case of relapse.  Don't give up. Remember that a relapse doesn't mean that you have failed. Use the experience to learn the triggers that lead you to drink or use drugs. Then quit again. Recovery is a lifelong process. Many people have several relapses before they are able to quit for good.  Follow-up care is a key part of your treatment and safety. Be sure to make and go to all appointments, and call your doctor if you are having problems. It's  "also a good idea to know your test results and keep a list of the medicines you take.  When should you call for help?   Call 911  anytime you think you may need emergency care. For example, call if you or someone else:    Has overdosed or has withdrawal signs. Be sure to tell the emergency workers that you are or someone else is using or trying to quit using drugs. Overdose or withdrawal signs may include:  Losing consciousness.  Seizure.  Seeing or hearing things that aren't there (hallucinations).     Is thinking or talking about suicide or harming others.   Where to get help 24 hours a day, 7 days a week   If you or someone you know talks about suicide, self-harm, a mental health crisis, a substance use crisis, or any other kind of emotional distress, get help right away. You can:    Call the Suicide and Crisis Lifeline at 988.     Call 4-792-471-TALK (1-893.298.1189).     Text HOME to 999323 to access the Crisis Text Line.   Consider saving these numbers in your phone.  Go to [employer] for more information or to chat online.  Call your doctor now or seek immediate medical care if:    You are having withdrawal symptoms. These may include nausea or vomiting, sweating, shakiness, and anxiety.   Watch closely for changes in your health, and be sure to contact your doctor if:    You have a relapse.     You need more help or support to stop.   Where can you learn more?  Go to https://www.flatev.net/patiented  Enter H573 in the search box to learn more about \"Substance Use Disorder: Care Instructions.\"  Current as of: November 15, 2023               Content Version: 14.0    0231-0675 [employer].   Care instructions adapted under license by your healthcare professional. If you have questions about a medical condition or this instruction, always ask your healthcare professional. [employer] disclaims any warranty or liability for your use of this information.      Safer Sex: Care " Instructions  Overview  Safer sex is a way to reduce your risk of getting a sexually transmitted infection (STI). It can also help prevent pregnancy.  Several products can help you practice safer sex and reduce your chance of STIs. One of the best is a condom. There are internal and external condoms. You can use a special rubber sheet (dental dam) for protection during oral sex. Disposable gloves can keep your hands from touching blood, semen, or other body fluids that can carry infections.  Remember that birth control methods such as diaphragms, IUDs, foams, and birth control pills do not stop you from getting STIs.  Follow-up care is a key part of your treatment and safety. Be sure to make and go to all appointments, and call your doctor if you are having problems. It's also a good idea to know your test results and keep a list of the medicines you take.  How can you care for yourself at home?  Think about getting vaccinated to help prevent hepatitis A, hepatitis B, and human papillomavirus (HPV). They can be spread through sex.  Use a condom every time you have sex. Use an external condom, which goes on the penis. Or use an internal condom, which goes into the vagina or anus.  Make sure you use the right size external condom. A condom that's too small can break easily. A condom that's too big can slip off during sex.  Use a new condom each time you have sex. Be careful not to poke a hole in the condom when you open the wrapper.  Don't use an internal condom and an external condom at the same time.  Never use petroleum jelly (such as Vaseline), grease, hand lotion, baby oil, or anything with oil in it. These products can make holes in the condom.  After intercourse, hold the edge of the condom as you remove it. This will help keep semen from spilling out of the condom.  Do not have sex with anyone who has symptoms of an STI, such as sores on the genitals or mouth.  Do not drink a lot of alcohol or use drugs before  "sex.  Limit your sex partners. Sex with one partner who has sex only with you can reduce your risk of getting an STI.  Don't share sex toys. But if you do share them, use a condom and clean the sex toys between each use.  Talk to any partners before you have sex. Talk about what you feel comfortable with and whether you have any boundaries with sex. And find out if your partner or partners may be at risk for any STI. Keep in mind that a person may be able to spread an STI even if they do not have symptoms. You and any partners may want to get tested for STIs.  Where can you learn more?  Go to https://www.[employer].net/patiented  Enter B608 in the search box to learn more about \"Safer Sex: Care Instructions.\"  Current as of: November 27, 2023               Content Version: 14.0    3696-9197 [employer].   Care instructions adapted under license by your healthcare professional. If you have questions about a medical condition or this instruction, always ask your healthcare professional. [employer] disclaims any warranty or liability for your use of this information.         "

## 2024-07-17 LAB — HIV 1+2 AB+HIV1 P24 AG SERPL QL IA: NONREACTIVE

## 2024-07-22 NOTE — RESULT ENCOUNTER NOTE
-Your STD labs all came back negative (gonorrhea, chlamydia, HIV and syphilis).  -Your CBC labs (which includes blood labs looking for signs of infection or anemia) are all normal.  -Your comprehensive metabolic panel (CMP, which includes electrolyte levels, blood sugar levels, and kidney and liver function tests) looks good, and your magnesium is in a good range as well.  -Your cholesterol panel looks very good with a low (though higher than in the past) LDL (the bad cholesterol) and a good/high HDL (the good cholesterol).     Please let me know if you have any questions.  Best,   Nael Gruber MD

## 2024-09-23 NOTE — TELEPHONE ENCOUNTER
FUTURE VISIT INFORMATION      FUTURE VISIT INFORMATION:  Date: 9/25/24  Time: 9:00 am  [place] [facility] [place]ERRAL INFORMATION:  Referring provider:  Self referred  Reason for visit/diagnosis:  Hives and is charmaine would like allergy testing     RECORDS REQUESTED FROM:       [facility].

## 2024-09-25 ENCOUNTER — PRE VISIT (OUTPATIENT)
Dept: ALLERGY | Facility: CLINIC | Age: 34
End: 2024-09-25

## 2024-09-25 ENCOUNTER — OFFICE VISIT (OUTPATIENT)
Dept: ALLERGY | Facility: CLINIC | Age: 34
End: 2024-09-25
Payer: COMMERCIAL

## 2024-09-25 DIAGNOSIS — J30.81 CAT ALLERGIES: ICD-10-CM

## 2024-09-25 DIAGNOSIS — J30.2 SEASONAL AND PERENNIAL ALLERGIC RHINOCONJUNCTIVITIS: ICD-10-CM

## 2024-09-25 DIAGNOSIS — L71.0 PERIORAL DERMATITIS: ICD-10-CM

## 2024-09-25 DIAGNOSIS — L23.5 ALLERGIC DERMATITIS DUE TO OTHER CHEMICAL PRODUCT: ICD-10-CM

## 2024-09-25 DIAGNOSIS — J30.89 SEASONAL AND PERENNIAL ALLERGIC RHINOCONJUNCTIVITIS: ICD-10-CM

## 2024-09-25 DIAGNOSIS — Z91.09 HOUSE DUST MITE ALLERGY: ICD-10-CM

## 2024-09-25 DIAGNOSIS — Z88.9 ATOPY: ICD-10-CM

## 2024-09-25 DIAGNOSIS — H10.10 SEASONAL AND PERENNIAL ALLERGIC RHINOCONJUNCTIVITIS: ICD-10-CM

## 2024-09-25 DIAGNOSIS — L20.89 OTHER ATOPIC DERMATITIS: ICD-10-CM

## 2024-09-25 DIAGNOSIS — L24.9 IRRITANT DERMATITIS: Primary | ICD-10-CM

## 2024-09-25 PROCEDURE — 99204 OFFICE O/P NEW MOD 45 MIN: CPT | Mod: 25 | Performed by: DERMATOLOGY

## 2024-09-25 PROCEDURE — 95004 PERQ TESTS W/ALRGNC XTRCS: CPT | Performed by: DERMATOLOGY

## 2024-09-25 RX ORDER — TACROLIMUS 1 MG/G
OINTMENT TOPICAL
COMMUNITY
Start: 2024-07-01

## 2024-09-25 RX ORDER — PIMECROLIMUS 10 MG/G
CREAM TOPICAL 2 TIMES DAILY
Qty: 30 G | Refills: 2 | Status: SHIPPED | OUTPATIENT
Start: 2024-09-25 | End: 2024-10-01

## 2024-09-25 ASSESSMENT — PAIN SCALES - GENERAL: PAINLEVEL: NO PAIN (0)

## 2024-09-25 NOTE — LETTER
9/25/2024      Bess Ellis  [address] [facility] [address]      Dear Colleague,    Thank you for referring your patient, Bess Ellis, to the [facility] [place]. Please see a copy of my visit note below.    [facility] Dermato-allergology Note  Office visit  Encounter Date: Sep 25, 2024  ____________________________________________    CC: Allergies (Bess is here today perioral dermatitis, hives and eczema )      HPI:  (Sep 25, 2024)  Ms. Bess Ellis is a(n) 34 year old female who presents today as new patient for allergy consultation  - Self-referred primarily for spreading hives, but also for perioral dermatitis and eczema  - No prior allergy records in [facility]  - However, multiple claims-derived visit encounters (without actual visit notes) in Epic from 1/17/19 to 1/25/24 with Dr. Ayesha Hernandez ([facility]) or various dermatology concerns, with rash and dermatitis as dx starting 6/10/19  - Additional claims-derived visit on 3/28/24 with Dr. Zion Zabala ([facility]) for idiopathic urticaria, dermatitis, pruritus, and dyshidrosis  - Then 5/6/24 and 6/24/24 claims-derived visits with Dr. Lacie Hinds ([facility]) for idiopathic urticaria, perioral dermatitis, pruritus, and dyshidrosis  - Starting 1/2024  - Face, eyes, mouth, neck, and chest, abdomen, and later on occasionally on legs  - Would have tiny little bumps that became really itchy and red  - Would last for a couple days  - Denies skin being dry or scaly  - Also has hand eczema  - Also has perioral dermatitis  - Has tried Protopic (face), steroids, antihistamines, doxycycline, and metronidazole  - 1st tried topical steroids on face  - Then doxycycline  - When stopped doxycycline, couldn't sleep at night with itchy face  - At that time told to stop topical steroids on face, and given 2nd course of doxycycline: BID x 1 month, and the QD x 1 month  - Started 3rd month of doxycyline, has  "started to taper off, and sometimes feels a little itchy  - Uses CeraVe face wash; does not use BPO (tried years ago and had a reaction)  - Last dyed her hair ~1 year ago  - Has chronically dry eyes  - Skin care routine is \"basic\": includes CeraVe as above, tretinoin, vitamin C and eye creams  - Otherwise feeling well in usual state of health    Physical Exam:  General: In no acute distress, well-developed, well-nourished  Eyes: no conjunctivitis  ENT: no signs of rhinitis   Pulmonary: no wheezing or coughing  Skin: Focused examination of the skin on test sites was performed = see test results below  Focused examination of the face and hands was performed.  - Today, on her hands, there is a slightly erythematous xerotic 5 cm x 3 cm plaque on right dorsal palm, and slightly less on left hand. Back of hands are xerotic.   - 1/2/2024 photos on patient's phone: Papules around the mouth on the chin area.  - 5/2/24 photo on patient's phone: Clear perioral dermatitis with typical pustules around both corners of the mouth. She reports she heard about treatment for atopic dermatitis with Eagle's soap and azelaic acid, which she was using at this time.    Past Medical History:   Patient Active Problem List   Diagnosis     IUD contraception     Other irritable bowel syndrome     ASCUS with positive high risk HPV cervical     Acne vulgaris     Dysmenorrhea     Food protein induced enterocolitis syndrome     Constipation, unspecified constipation type     Dyspareunia in female     Mild major depression (H24)     Past Medical History:   Diagnosis Date     Abnormal Pap smear of cervix 02/28/2018 02/28/18: see problem list.     Cervical high risk HPV (human papillomavirus) test positive 03/05/2019 03/05/19: See problem list.      Depressive disorder 2021     Allergies:  No Active Allergies    Medications:  Current Outpatient Medications   Medication Sig Dispense Refill     Cholecalciferol (VITAMIN D-3 PO) Take 125 mcg by " mouth daily       Cyanocobalamin (B-12 PO) Take 1 tablet by mouth daily       hydrocortisone 2.5 % cream Apply 1 Application topically as needed       levonorgestrel (MIRENA) 20 MCG/DAY IUD 1 each by Intrauterine route once (Liletta IUD - placed [facility] in )       Magnesium 300 MG CAPS Take 1 tablet by mouth daily       metroNIDAZOLE (METROCREAM) 0.75 % external cream        Omega-3 Fatty Acids (FISH OIL) 1200 MG capsule Take 1,200 mg by mouth daily       pimecrolimus (ELIDEL) 1 % external cream Apply topically 2 times daily. On face (is less greasy than Protopic) and therefore better for dermatitis in face 30 g 2     Probiotic Product (PROBIOTIC DAILY PO)        spironolactone (ALDACTONE) 50 MG tablet Take 50 mg by mouth daily Through dermatology       tacrolimus (PROTOPIC) 0.1 % external ointment        tretinoin (RETIN-A) 0.01 % external gel Apply topically At Bedtime       No current facility-administered medications for this visit.     Family History:  Family History   Problem Relation Age of Onset     Cancer Mother 57        CLL, donig well, no txt     Gastrointestinal Disease Mother         IBS     Other Cancer Mother         CLL     Gastrointestinal Disease Sister         IBS     Depression Sister      Anxiety Disorder Sister      Cancer Maternal Grandmother 65        CML, had txt,  at 90, unsure cause of death     Coronary Artery Disease Maternal Grandfather         later in life     Colon Cancer Paternal Grandmother         60s     Coronary Artery Disease Paternal Grandfather         later in life     Colon Cancer Paternal Grandfather         60s     Hypertension Father      Cerebrovascular Disease Father      Anxiety Disorder Father      Glaucoma No family hx of      Macular Degeneration No family hx of    Positive for allergies. Negative for eczema and asthma.    Previous Labs, Allergy Tests, Dermatopathology, Imaging:  See HPI - no records in Epic as of 24.    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 Does not wear gloves at work. Patient has the following hobbies or non-occupational exposure: weightlifting (will apply , she is unsure of material) and dancing.    Order for Future Allergy Testing:    [x] Outpatient  [] Inpatient: [facility]..../ Bed ...    Skin Atopy (atopic dermatitis)? [x] Yes     [] No  Comments: diagnosed by outside dermatology - see HPI  Childhood eczema?   [] Yes     [x] No  Comments:   Hand eczema?   [x] Yes     [] No  If yes, leading hand? [] Right     [] Left     [] Ambidextrous  Comments: see HPI    Contact allergies?   [x] Yes     [] No  Comments:   Including adhesives/bandages? [] Yes     [x] No  Comments:   Including metals?   [x] Yes     [] No  Comments: earrings: sometimes would have itchy ears with slight redness and swelling when wearing cheap jewelry    Drug allergies?   [] Yes     [x] No  Comments:     Angioedema?   [] Yes     [] No  Comments:     Urticaria?   [x] Yes     [] No  Comments: see HPI - reason for visit    Food allergies?   [] Yes     [x] No  Comments: eats cherries daily without mouth/throat itching    Pet allergies?   [] Yes     [x] No  Comments: just got a lab retriever puppy 2 days ago; does not have symptoms around; sneezing if she touches cats    Environmental allergy symptoms?  [] Conjunctivitis  [] Otitis  [] Pharyngitis  [] Polyposis  [] Postnasal Drip  [x] Rhinitis - slightly stuffy  [] Sinusitis  [] None  Comments:     HENT Operations?  [] Adenoids [] Septum [] Sinus  [] Tonsils        [] Other:   [] None  Comments:     Pulmonary symptoms (from birth to present)?  [] Asthma bronchiale  Inhaler(s)?:   [] Coughing  [] Other  [x] None  Comments:     Environmental and pulmonary symptoms aggravated by?  Season: [] None     [] I     [] II     [x] III     [x] IV     [x] V     [] VI          [] VII     [x] VIII     [x] IX     [x] X     [] XI      [] XII     [] Perennial  Time of Day: [] None     [x] Morning (once moving to new house recently)     [] Noon     [] Evening     [] Night     [] Whole Day  Location/Changes: [] None     [] Inside     [] Outside     [] Mountain     [] Sea     [] Other:   Triggers (specific): [] None     [] Animals     [] Dust     [] Mold     [] Plants     [] Other:   Triggers (other): [] None     [] Psyche     [] Sport     [] Work     [] Other:   Irritant: [] None     [] Cold     [] Heat     [] Odors     [] Physical Efforts     [] Smoke     [] Other:     Order for PATCH TESTS  Reason for tests (suspected allergy): recurrent therapy-resistant perioral dermatitis  Known previous allergies: none  Standardized panels  [x] Standard panel (40 tests)  [x] Preservatives & Antimicrobials (31 tests)  [x] Emulsifiers & Additives (25 tests)   [x] Perfumes/Flavours & Plants (25 tests)  [x] Hairdresser panel (12 tests)  [] Rubber Chemicals (22 tests)  [] Plastics (26 tests)  [x] Colorants/Dyes/Food additives (20 tests)  [] Metals (implants/dental) (24 tests)  [] Local anaesthetics/NSAIDs (13 tests)  [] Antibiotics & Antimycotics (14 tests)   [] Corticosteroids (15 tests)   [] Photopatch test (62 tests)   [] Others:     [] Patient's Own Products:   DO NOT test if chemical or biological identity is unknown!   always ask from patient the product information and safety sheets (MSDS)       Order for PRICK TESTS    Reason for tests (suspected allergy): atopy?  Known previous allergies: none    Standardized prick panels  [x] Atopic panel (20 tests)  [] Pediatric Panel (12 tests)  [] Milk, Meat, Eggs, Grains (20 tests)   [] Dust, Epithelia, Feathers (10 tests)  [] Fish, Seafood, Shellfish (17 tests)  [] Nuts, Beans (14 tests)  [] Spice, Vegetable, Fruit (17 tests)  [] Pollen Panel = Tree, Grass, Weed (24 tests)  [] Others:     [] Patient's Own Products:   DO NOT test if chemical or biological identity is unknown!   always ask from patient the product  information and safety sheets (MSDS)     Standardized intradermal tests  [] Alternaria alternata  [] Aspergillus fumigatus  [] Cladosporium herbarum   [] Penicillium notatum  [] Dermatophagoides farinae  [] Dermatophagoides pteronyssinus  [] Dog Epithelium  [] Cat Epithelium  [] Others:     [] Bee venom   [] Wasp venom  !!Specific protocol with dilutions!!       Order for Drug allergy tests (prick & intradermal & patch tests)    [] Penicillin G     [] Ampicillin   [] Cefazolin        [] Ceftriaxone     [] Ceftazidime     [] Cefepime     [] Cefuroxime  [] Bactrim  [] Iodixanol     [] Iopamidol        [] Iohexol  [] Others:   [] Patient's Own:   Order for ... as test date      Atopy Screen (Placed Sep 25, 2024)  No Substance Readings (15 min) Evaluation   POS Histamine 1mg/ml +/++    NEG NaCl 0.9% -      No Substance Readings (15 min) Evaluation   1 Alternaria alternata (tenuis)  +    2 Cladosporium herbarum +    3 Aspergillus fumigatus -    4 Penicillium notatum -    5 Dermatophagoides pteronyssinus ++    6 Dermatophagoides farinae +++    7 Dog epithelium (canis spp) +    8 Cat hair (tiffanie catus) ++    9 Cockroach   (Blatella americana & germanica) -    10 Grass mix midwest   (Estefani, Orchard, Redtop, Agus) -    11 Jonh grass (sorghum halepense) -    12 Weed mix   (common Cocklebur, Lamb s quarters, rough redroot Pigweed, Dock/Sorrel) -    13 Mug wort (artemisia vulgare) +    14 Ragweed giant/short (ambrosia spp) +++    15 White birch (Betula papyrifera) +++    16 Tree mix 1 (Pecan, Maple BHR, Oak RVW, american [place], black [place]) +    17 Red cedar (juniperus virginia) -    18 Tree mix 2   (white Nabil, river/red Birch, black [place], common [place], american Elm) +/++    19 Box elder/Maple mix (acer spp) -    20 [place]han shagbark (carya ovata) -    Conclusion:      Assessment & Plan:    ==> Final Diagnosis:     # Recurrent therapy-resistant perioral dermatitis  DDx: Additionally irritant dermatitis with  atopy  DDx: Allergic contact dermatitis   * chronic illness with exacerbation, progression, side effects from treatment    # Atopic predisposition with:   Slight morning rhinitis with strong dust mite sensitization  Seasonal rhinitis in spring (birch pollen) and fall (ragweed and mug wort)  Hypersensitive and hyperirritable skin  Possible nickel allergy  Rhinitis around cats --> prick test positive  * stable chronic illness    # Subacute to chronic eczema on back of hands  DDx: Irritant dermatitis with atopy  DDx: Allergic contact dermatitis   * chronic illness with exacerbation, progression, side effects from treatment    # 1x episode of urticaria in 1/2024. Resolved.    These conclusions are made at the best of one's knowledge and belief based on the provided evidence such as patient's history and allergy test results and they can change over time or can be incomplete because of missing information.    ==> Treatment Plan:    >> Updated treatment regimen:  - Encouraged daily moisturization of face and body with a gentle moisturizer, such as Vanicream, Cetaphil, or CeraVe.  - Continue with CeraVe facial wash.  - Prescribed Elidel: apply BID on face, hands, and rest of body if skin is red.   - Alternatively, can use Protopic BID on hands.  - Once face is no longer as red, for maintenance:  - Apply MetroCream prescribed by [facility], and then Elidel QPM.  - If skin calms down and is controlled on above regimen, may cancel patch tests planned for spring 2025.    >> For house dust mite allergy:  - Provided informational handout for house dust mite reduction.  - Can take Claritin or Zyrtec before she goes to bed.    Procedures Performed: Allergy prick tests    Staff Involved: Provider, Staff, and Scribe    Scribe Disclosure:   I, NOVA KIRKPATRICK, am serving as a scribe to document services personally performed by See Hawkins MD based on data collection and the provider's statements to me.     Staff  Physician Comments:  I was present with the scribe who participated in the documentation of the note. I have verified the history and personally performed the physical exam and medical decision making. I agree with the assessment and plan as documented in the note. I have reviewed and if necessary amended the note.      See Hawkins MD  Professor  Head of Dermato-Allergy Division  Department of Dermatology  [facility]      Follow-up in Derm-Allergy clinic in spring 2025 for allergy tests as planned, with check-in (can be virtual or in person) 2 weeks before to determine if patch tests are still necessary    I spent a total of 40 minutes with Bess Ellis. This time was spent counseling the patient and/or coordinating care, explaining the allergy tests, performing allergy tests and assessing the clinical relevance.      Again, thank you for allowing me to participate in the care of your patient.        Sincerely,        See Hawkins MD

## 2024-09-25 NOTE — NURSING NOTE
Dermatology Rooming Note    Bess Ellis's goals for this visit include:   Chief Complaint   Patient presents with    Allergies     Bess is here today perioral dermatitis, hives and eczema      Cherise MEZA CMA

## 2024-09-25 NOTE — PROGRESS NOTES
"Formerly [facility] Dermato-allergology Note  Office visit  Encounter Date: Sep 25, 2024  ____________________________________________    CC: Allergies (Bess is here today perioral dermatitis, hives and eczema )      HPI:  (Sep 25, 2024)  Ms. Bess Ellis is a(n) 34 year old female who presents today as new patient for allergy consultation  - Self-referred primarily for spreading hives, but also for perioral dermatitis and eczema  - No prior allergy records in [facility]  - However, multiple claims-derived visit encounters (without actual visit notes) in Epic from 1/17/19 to 1/25/24 with Dr. Ayesha Hernandez ([facility]) or various dermatology concerns, with rash and dermatitis as dx starting 6/10/19  - Additional claims-derived visit on 3/28/24 with Dr. Zion Zabala ([facility]) for idiopathic urticaria, dermatitis, pruritus, and dyshidrosis  - Then 5/6/24 and 6/24/24 claims-derived visits with Dr. Lacie Hinds ([facility]) for idiopathic urticaria, perioral dermatitis, pruritus, and dyshidrosis  - Starting 1/2024  - Face, eyes, mouth, neck, and chest, abdomen, and later on occasionally on legs  - Would have tiny little bumps that became really itchy and red  - Would last for a couple days  - Denies skin being dry or scaly  - Also has hand eczema  - Also has perioral dermatitis  - Has tried Protopic (face), steroids, antihistamines, doxycycline, and metronidazole  - 1st tried topical steroids on face  - Then doxycycline  - When stopped doxycycline, couldn't sleep at night with itchy face  - At that time told to stop topical steroids on face, and given 2nd course of doxycycline: BID x 1 month, and the QD x 1 month  - Started 3rd month of doxycyline, has started to taper off, and sometimes feels a little itchy  - Uses CeraVe face wash; does not use BPO (tried years ago and had a reaction)  - Last dyed her hair ~1 year ago  - Has chronically dry eyes  - Skin care routine is \"basic\": includes " CeraVe as above, tretinoin, vitamin C and eye creams  - Otherwise feeling well in usual state of health    Physical Exam:  General: In no acute distress, well-developed, well-nourished  Eyes: no conjunctivitis  ENT: no signs of rhinitis   Pulmonary: no wheezing or coughing  Skin: Focused examination of the skin on test sites was performed = see test results below  Focused examination of the face and hands was performed.  - Today, on her hands, there is a slightly erythematous xerotic 5 cm x 3 cm plaque on right dorsal palm, and slightly less on left hand. Back of hands are xerotic.   - 1/2/2024 photos on patient's phone: Papules around the mouth on the chin area.  - 5/2/24 photo on patient's phone: Clear perioral dermatitis with typical pustules around both corners of the mouth. She reports she heard about treatment for atopic dermatitis with [place]'s soap and azelaic acid, which she was using at this time.    Past Medical History:   Patient Active Problem List   Diagnosis    IUD contraception    Other irritable bowel syndrome    ASCUS with positive high risk HPV cervical    Acne vulgaris    Dysmenorrhea    Food protein induced enterocolitis syndrome    Constipation, unspecified constipation type    Dyspareunia in female    Mild major depression (H24)     Past Medical History:   Diagnosis Date    Abnormal Pap smear of cervix 02/28/2018 02/28/18: see problem list.    Cervical high risk HPV (human papillomavirus) test positive 03/05/2019 03/05/19: See problem list.     Depressive disorder 2021     Allergies:  No Active Allergies    Medications:  Current Outpatient Medications   Medication Sig Dispense Refill    Cholecalciferol (VITAMIN D-3 PO) Take 125 mcg by mouth daily      Cyanocobalamin (B-12 PO) Take 1 tablet by mouth daily      hydrocortisone 2.5 % cream Apply 1 Application topically as needed      levonorgestrel (MIRENA) 20 MCG/DAY IUD 1 each by Intrauterine route once (Liletta I[facility] - [facility] )      Magnesium 300 MG CAPS Take 1 tablet by mouth daily      metroNIDAZOLE (METROCREAM) 0.75 % external cream       Omega-3 Fatty Acids (FISH OIL) 1200 MG capsule Take 1,200 mg by mouth daily      pimecrolimus (ELIDEL) 1 % external cream Apply topically 2 times daily. On face (is less greasy than Protopic) and therefore better for dermatitis in face 30 g 2    Probiotic Product (PROBIOTIC DAILY PO)       spironolactone (ALDACTONE) 50 MG tablet Take 50 mg by mouth daily Through dermatology      tacrolimus (PROTOPIC) 0.1 % external ointment       tretinoin (RETIN-A) 0.01 % external gel Apply topically At Bedtime       No current facility-administered medications for this visit.     Family History:  Family History   Problem Relation Age of Onset    Cancer Mother 57        CLL, donig well, no txt    Gastrointestinal Disease Mother         IBS    Other Cancer Mother         CLL    Gastrointestinal Disease Sister         IBS    Depression Sister     Anxiety Disorder Sister     Cancer Maternal Grandmother 65        CML, had txt,  at 90, unsure cause of death    Coronary Artery Disease Maternal Grandfather         later in life    Colon Cancer Paternal Grandmother         60s    Coronary Artery Disease Paternal Grandfather         later in life    Colon Cancer Paternal Grandfather         60s    Hypertension Father     Cerebrovascular Disease Father     Anxiety Disorder Father     Glaucoma No family hx of     Macular Degeneration No family hx of    Positive for allergies. Negative for eczema and asthma.    Previous Labs, Allergy Tests, Dermatopathology, Imaging:  See HPI - no records in Epic as of 24.    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 Does not wear gloves at work. Patient has the following hobbies or non-occupational exposure: weightlifting (will apply , she is unsure of material) and  dancing.    Order for Future Allergy Testing:    [x] Outpatient  [] Inpatient: [facility]..../ Bed ...    Skin Atopy (atopic dermatitis)? [x] Yes     [] No  Comments: diagnosed by outside dermatology - see HPI  Childhood eczema?   [] Yes     [x] No  Comments:   Hand eczema?   [x] Yes     [] No  If yes, leading hand? [] Right     [] Left     [] Ambidextrous  Comments: see HPI    Contact allergies?   [x] Yes     [] No  Comments:   Including adhesives/bandages? [] Yes     [x] No  Comments:   Including metals?   [x] Yes     [] No  Comments: earrings: sometimes would have itchy ears with slight redness and swelling when wearing cheap jewelry    Drug allergies?   [] Yes     [x] No  Comments:     Angioedema?   [] Yes     [] No  Comments:     Urticaria?   [x] Yes     [] No  Comments: see HPI - reason for visit    Food allergies?   [] Yes     [x] No  Comments: eats cherries daily without mouth/throat itching    Pet allergies?   [] Yes     [x] No  Comments: just got a lab retriever puppy 2 days ago; does not have symptoms around; sneezing if she touches cats    Environmental allergy symptoms?  [] Conjunctivitis  [] Otitis  [] Pharyngitis  [] Polyposis  [] Postnasal Drip  [x] Rhinitis - slightly stuffy  [] Sinusitis  [] None  Comments:     HENT Operations?  [] Adenoids [] Septum [] Sinus  [] Tonsils        [] Other:   [] None  Comments:     Pulmonary symptoms (from birth to present)?  [] Asthma bronchiale  Inhaler(s)?:   [] Coughing  [] Other  [x] None  Comments:     Environmental and pulmonary symptoms aggravated by?  Season: [] None     [] I     [] II     [x] III     [x] IV     [x] V     [] VI          [] VII     [x] VIII     [x] IX     [x] X     [] XI     [] XII     [] Perennial  Time of Day: [] None     [x] Morning (once moving to new house recently)     [] Noon     [] Evening     [] Night     [] Whole Day  Location/Changes: [] None     [] Inside     [] Outside     [] Mountain     [] Sea     [] Other:   Triggers (specific): []  None     [] Animals     [] Dust     [] Mold     [] Plants     [] Other:   Triggers (other): [] None     [] Psyche     [] Sport     [] Work     [] Other:   Irritant: [] None     [] Cold     [] Heat     [] Odors     [] Physical Efforts     [] Smoke     [] Other:     Order for PATCH TESTS  Reason for tests (suspected allergy): recurrent therapy-resistant perioral dermatitis  Known previous allergies: none  Standardized panels  [x] Standard panel (40 tests)  [x] Preservatives & Antimicrobials (31 tests)  [x] Emulsifiers & Additives (25 tests)   [x] Perfumes/Flavours & Plants (25 tests)  [x] Hairdresser panel (12 tests)  [] Rubber Chemicals (22 tests)  [] Plastics (26 tests)  [x] Colorants/Dyes/Food additives (20 tests)  [] Metals (implants/dental) (24 tests)  [] Local anaesthetics/NSAIDs (13 tests)  [] Antibiotics & Antimycotics (14 tests)   [] Corticosteroids (15 tests)   [] Photopatch test (62 tests)   [] Others:     [] Patient's Own Products:   DO NOT test if chemical or biological identity is unknown!   always ask from patient the product information and safety sheets (MSDS)       Order for PRICK TESTS    Reason for tests (suspected allergy): atopy?  Known previous allergies: none    Standardized prick panels  [x] Atopic panel (20 tests)  [] Pediatric Panel (12 tests)  [] Milk, Meat, Eggs, Grains (20 tests)   [] Dust, Epithelia, Feathers (10 tests)  [] Fish, Seafood, Shellfish (17 tests)  [] Nuts, Beans (14 tests)  [] Spice, Vegetable, Fruit (17 tests)  [] Pollen Panel = Tree, Grass, Weed (24 tests)  [] Others:     [] Patient's Own Products:   DO NOT test if chemical or biological identity is unknown!   always ask from patient the product information and safety sheets (MSDS)     Standardized intradermal tests  [] Alternaria alternata  [] Aspergillus fumigatus  [] Cladosporium herbarum   [] Penicillium notatum  [] Dermatophagoides farinae  [] Dermatophagoides pteronyssinus  [] Dog Epithelium  [] Cat Epithelium  []  Others:     [] Bee venom   [] Wasp venom  !!Specific protocol with dilutions!!       Order for Drug allergy tests (prick & intradermal & patch tests)    [] Penicillin G     [] Ampicillin   [] Cefazolin        [] Ceftriaxone     [] Ceftazidime     [] Cefepime     [] Cefuroxime  [] Bactrim  [] Iodixanol     [] Iopamidol        [] Iohexol  [] Others:   [] Patient's Own:   Order for ... as test date      Atopy Screen (Placed Sep 25, 2024)  No Substance Readings (15 min) Evaluation   POS Histamine 1mg/ml +/++    NEG NaCl 0.9% -      No Substance Readings (15 min) Evaluation   1 Alternaria alternata (tenuis)  +    2 Cladosporium herbarum +    3 Aspergillus fumigatus -    4 Penicillium notatum -    5 Dermatophagoides pteronyssinus ++    6 Dermatophagoides farinae +++    7 Dog epithelium (canis spp) +    8 Cat hair (tiffanie catus) ++    9 Cockroach   (Blatella americana & germanica) -    10 Grass mix midwest   (Estefani, Orchard, Redtop, Agus) -    11 Jonh grass (sorghum halepense) -    12 Weed mix   (common Cocklebur, Lamb s quarters, rough redroot Pigweed, Dock/Sorrel) -    13 Mug wort (artemisia vulgare) +    14 Ragweed giant/short (ambrosia spp) +++    15 White birch (Betula papyrifera) +++    16 Tree mix 1 (Pecan, Maple BHR, Oak RVW, american Bryant, black Barnum) +    17 Red cedar (juniperus virginia) -    18 Tree mix 2   (white Nabil, river/red Birch, black Seibert, common [place], american Elm) +/++    19 Box elder/Maple mix (acer spp) -    20 Dolph shagbark (carya ovata) -    Conclusion:      Assessment & Plan:    ==> Final Diagnosis:     # Recurrent therapy-resistant perioral dermatitis  DDx: Additionally irritant dermatitis with atopy  DDx: Allergic contact dermatitis   * chronic illness with exacerbation, progression, side effects from treatment    # Atopic predisposition with:   Slight morning rhinitis with strong dust mite sensitization  Seasonal rhinitis in spring (birch pollen) and fall (ragweed and mug  wort)  Hypersensitive and hyperirritable skin  Possible nickel allergy  Rhinitis around cats --> prick test positive  * stable chronic illness    # Subacute to chronic eczema on back of hands  DDx: Irritant dermatitis with atopy  DDx: Allergic contact dermatitis   * chronic illness with exacerbation, progression, side effects from treatment    # 1x episode of urticaria in 1/2024. Resolved.    These conclusions are made at the best of one's knowledge and belief based on the provided evidence such as patient's history and allergy test results and they can change over time or can be incomplete because of missing information.    ==> Treatment Plan:    >> Updated treatment regimen:  - Encouraged daily moisturization of face and body with a gentle moisturizer, such as Vanicream, Cetaphil, or CeraVe.  - Continue with CeraVe facial wash.  - Prescribed Elidel: apply BID on face, hands, and rest of body if skin is red.   - Alternatively, can use Protopic BID on hands.  - Once face is no longer as red, for maintenance:  - Apply MetroCream prescribed by [facility], and then Elidel QPM.  - If skin calms down and is controlled on above regimen, may cancel patch tests planned for spring 2025.    >> For house dust mite allergy:  - Provided informational handout for house dust mite reduction.  - Can take Claritin or Zyrtec before she goes to bed.    Procedures Performed: Allergy prick tests    Staff Involved: Provider, Staff, and Scribe    Scribe Disclosure:   I, NOVA KIRKPATRICK, am serving as a scribe to document services personally performed by See Hawkins MD based on data collection and the provider's statements to me.     Staff Physician Comments:  I was present with the scribe who participated in the documentation of the note. I have verified the history and personally performed the physical exam and medical decision making. I agree with the assessment and plan as documented in the note. I have reviewed and if  necessary amended the note.      See Hawkins MD  Professor  Head of Dermato-Allergy Division  Department of Dermatology  [facility]      Follow-up in Derm-Allergy clinic in spring 2025 for allergy tests as planned, with check-in (can be virtual or in person) 2 weeks before to determine if patch tests are still necessary    I spent a total of 40 minutes with Bess Ellis. This time was spent counseling the patient and/or coordinating care, explaining the allergy tests, performing allergy tests and assessing the clinical relevance.

## 2024-09-25 NOTE — PATIENT INSTRUCTIONS
House Dust Mite Allergy        The house dust mite is an arachnid about 0.3 mm in size and not visible to the naked eye. There are around 150 species of house dust mites in the world. One mite produces up to 40 fecal droppings a day. One teaspoonful of bedroom dust contains an average of nearly 1000 mites and 250,000 minute droppings.    Causes and triggers of house dust mite allergy  The house dust mite requires a warm, moist environment without light in order to live and reproduce. Our beds are ideal. The mite feeds on human and animal skin scales. The allergen is mainly contained in the mite's feces. The feces contain allergy-triggering constituents which are spread in fine dust, are breathed in and can cause an allergic reaction.    Symptoms  When the allergens come into contact with the mucous membranes in the eyes, nose, mouth and throat, sufferers develop symptoms typical of an allergic cold (allergic rhinitis) or an allergic inflammation of the conjunctiva (allergic conjunctivitis): blocked or runny nose, sneezing, red, itchy eyes. If all of these symptoms are present, then the condition is also known as rhinoconjunctivitis. Often, the upper respiratory tract becomes chronically inflamed, primarily because house dust mites are present all year round.  The symptoms of house dust mite allergy typically occur in the morning and are more frequent in the cold months of the year.    Therapy and treatment  As a first step, mattress, pillows and duvet/comforter should be placed in mite-proof or anti-mite covers, sometimes known as encasings. Alternatively you can use pillows or comforter that can be washed at over 130 F monthly. At the same time, house dust should be minimized. If necessary, the symptoms can be treated with medication, for example antihistamines in the form of nasal sprays, eye drops and tablets. Desensitization/specific immunotherapy (SIT) is recommended for house dust allergy if all the measures  "above are not sufficient.    Tips and tricks:  Keep room temperature at 66-70 F and relative air humidity at a maximum of 50%.  Ideally, thoroughly air your home two to three times a day for 5 to 10 minutes each time.  Wash bed linens in at least 130 F every week.  Remove stuffed animals or freeze them every other week.  Keep ceiling fans off in the bedroom as they can stir up dust mite allergens.  Remove dust from furniture with a damp cloth and regularly wet mop floors.  Do not put pot and hydroponic plants in the bedroom and also avoid putting too many in living areas, as they increase room humidity.  When staying overnight in other accommodations, we recommend taking your own bed linen and the above anti-mite mattress covers with you.  Remove upholstered furniture from the bedroom and consider removing the carpets. Ideally, use sealed parquet or laminate yovany, cork tiles or yovany made of wood, novilon or PVC.  Maybe additionally reduce dust mites in mattress, upholstery, or yovany using hot steam .      Modified from \"House Dust Mite Allergy\" by [employer] [facility].      _____________________________    PATCH TESTING    WHAT IS A PATCH TEST ?    A patch test is a way of identifying whether a substance has caused a delayed reaction with skin inflammation, such as contact eczema or delayed (after days) reactions to drugs. We will use various different types of test compounds, which may include common allergens in occupation and daily life such as  preservatives, fragrances or even drugs. Most of the time we will use standardized, prefabricated test solutions and the choice of the substances and series tested will depend on the history of the allergic reactions. Sometimes we will test your own products you are exposed at home or at work. In order to avoid severe toxic reactions we need detailed information s or safety sheets about each of these test compounds.    HOW IS A PATCH TEST PERFORMED " ?    You will be given three appointments to complete this test. On the first appointment the nurse will apply 100-180 small test chambers each one of them containing a different allergen on your back and/or on your arms. We will use hypoallergenic and somehow waterproof adhesive tape. Afterwards the different sites will be marked with a waterproof marker. These patches must stay in place for 2 days. On the second appointment the patches will be removed and the allergy doctor/nurse will evaluate the skin reactions and maybe re-apply the marks. On the third appointment the allergy doctor will re-evaluate possible allergic reactions and discuss with you the nature of the allergens you react to and how to avoid them.    AVOID THE FOLLOWING:    DO NOT wash your back and other test areas during the entire test period (3-5 days), NO bathing or swimming  AVOID strenuous exercise with sweating  DO NOT scratch the test area  AVOID exposure to UV irradiation (sun, therapy)    Patch tests should be performed when the allergy is resolved  Remove patch tests only if severe reaction (itch, pain, blistering) and report to your doctor immediately. Faustina then the locations of each test field  If patch tests peel off, then try to fix them and record time and faustina test field  No oral steroids (e.g. Prednisone) 1 month prior to tests    WHAT ARE THE POSSIBLE RISKS OF PATCH TESTS ?    If you are allergic to a compound tested you will develop after a few days localized skin reactions similar to your previous allergic reaction. This includes formation of red, itchy skin lesions of few mm to cm with small vesicles and even blisters. The lesions will fade off over days and weeks and might sometimes leave for a few months some skin pigmentations. In rare cases a localized reaction to patch tests can become generalized. The tests with your own products might have some risks because they are not standardized and unanticipated reactions can  occur. We need as much information as possible to evaluate if your own products are safe to test and under what conditions. This has to be evaluated for each individual product.        ? WHAT ARE THE PREPARATIONS NEEDED FOR THESE VARIOUS ALLERGY TESTS ?    Some medications can affect the reactions to allergens during the tests. Therefore reveal all the medications you take to the allergy team and the doctor will discuss with you the medications before/during the tests. Normally, you have to respect following rules (unless otherwise instructed by doctor):  For prick, intradermal and provocation tests stop antihistamines (e.g. loratadine (Claritin), cetirizine (Zyrtec), fexofenadine (Allegra) etc 1 week prior to the appointment   For patch tests and provocation tests, stop systemic corticosteroids 1 month and topical steroids 1 week prior to tests  Eat normally and take a shower before testing begins (do not put anything, including lotion, on the back after showering)    _____________________________    SKIN PRICK TESTING    WHAT IS A SKIN PRICK TEST (SPT) ?    A skin prick test (SPT) is a simple and reliable diagnostic test used to identify substances ( allergens ) responsible for triggering the symptoms of allergy. The basic SPT panel includes common allergens, such as house dust mites, molds, dog and cat allergens and grass/tree pollen allergens. We have other more specialized series for various food allergens and sometimes we test your own food directly on your skin. Tests will be usually performed on the skin of the forearm (rarely on back). The skin may develop a red and itchy reaction which can be an indication of an allergy to to tested substance, but will be confirmed by discussion with the allergy specialist    HOW IS A SPT PERFORMED ?    The skin will be disinfected with 70% Isopropanol alcohol, then marked and numbered with a pen to identify the areas where the specific allergens will be tested. Afterwards  a drop of the allergen solution will be placed on the skin and then the skin gently pricked using the tip of a specially designed prick needle. With this procedure the most superficial part of the skin will be pierced to allow the test solution to diffuse into the skin and make contact with the reactive immune cells. After 15-30min the area will be examined and evaluated for possible allergic reactions.        WHAT ARE THE POSSIBLE RISKS OF SPT ?    If the skin reacts it will develop red, itchy wheals of 5-30mm diameter. The wheal and itch will usually disappear spontaneously after 1-2 hours. Sometimes there might be a delayed reactions after days and this has to be reported to the treating allergy specialist (could be another kind of reaction pattern and important piece of information). Rarely there are more serious generalized allergic reactions observed and therefore you will be under observation of the allergy team during the entire procedure. There is a small risk for some bleeding and skin infection by the SPT.    _____________________________    INTRADERMAL TESTS      WHAT IS AN INTRADERMAL TEST (IDT) ?    An intradermal test (IDT) is basically a continuation of the SPT and is sometimes proposed if the skin prick test is negative and the person is strongly suspected to have an allergy to a particular substance. IDT is particularly used for diagnosis of drug allergies and only sterile solutions will be tested by IDT. Because more allergen is delivered to the skin than in SPT the IDT can add more sensitivity to the allergy tests, but with a higher potential risk.     HOW IS AN INTRADERMAL TEST (IDT) PERFORMED ?    A small amount (~ 0.05ml) of the suspected allergen is injected with a very fine insulin needle just beneath the skin. The injections are made at spaced intervals after disinfection and marking of the skin areas. The tests are usually performed on the forearm (rarely back). The initial test will be  started with a very diluted solution and if the results are negatives the procedure might be repeated with serial dilutions of higher concentrations. Therefore, the estimated duration of this test is about 45-60 min. Sometimes we observe delayed type reactions to the intradermal tests after 1-4 days and if this is the case take a photo and inform our staff and load the photo into [employer]. Best would be to take the photos with a ruler that we know at which position the positive test was.          WHAT ARE THE POSSIBLE RISKS OF IDT ?    If the skin reacts it will develop red, itchy wheals of 5-30mm diameter. The wheal and itch will usually disappear spontaneously after 1-2 hours. Sometimes there might be a delayed reactions after days and this has to be reported to the treating allergy specialist (could be another kind of reaction pattern and important piece of information). Rarely there are more serious generalized allergic reactions observed and therefore you will be under observation of the allergy team during the entire procedure. Only sterile solutions will be used for injections, but there is still a small risk for infections or unpredictable local toxic reactions by the allergens. Some transient bleeding might occur.     _____________________________      ORAL PROVOCATION TEST      WHAT IS AN  ORAL PROVOCATION TEST (OPT) ?    An oral provocation test (OPT) is a procedure primarily used to exclude a specific allergy to a particular drug or food. These substances are then administered orally, rarely in case of drugs by subcutaneous or intravenous injections. This test is only conducted if the skin prick and intradermal and/or patch tests were negatives. A formal written consent will be taken prior to the provocation test.         HOW IS AN ORAL PROVOCATION TEST PERFORMED?    For oral (food/drugs) provocation tests you will have to ingest the food or drug hidden in a capsule or in its natural form. The test will  usually be placebo-controlled and will include a capsule or other application form not containing the suspected allergen, but non-reactive Mannitol sugar. The various drugs/food will be given at specific time intervals under strict medical supervision.     Two to six serial doses containing the test food or drug will given at normally 30min time intervals, which might vary for each individual. You will be required to stay at the clinic at all times so that the allergy doctors and nurses can early recognize and treat immediately possible allergic reactions. After the last dose you have to stay during at least 1h for observation. The entire procedure will take about 2-6hours, depending on the number of incremental doses and the observation time.     WHAT ARE THE POSSIBLE RISKS OF ORAL PROVOCATION TEST ?    The provocation tests have the highest risk potential of all the tests and therefore close observation is mandatory. The entire procedure will be discussed prior to the test (except when the placebo will be given). The reactions can go from local allergic reactions to more severe generalized reactions. Therefore, we will not perform provocation tests without prior evaluation by prick or intradermal tests and we plan to exclude an allergy by this test. If there is a higher risk, the test will be performed in a bed and with a secure intravenous access with infusion. The medication of a severe allergic reactions include high dose corticosteroids, antihistamines and if necessary epinephrine (Epi-Pen).    Useful Contact Information    Change of appointments or allergy-related [phone]  Email: [email or web address]  Location/address: [address] [place] [id number]    If you develop any serious or adverse allergic reaction after office hours please seek immediate medical assistance at the nearest clinic or emergency room.     If you have questions or concerns regarding your Allergy Clinic  bill or cost of care estimates, please reach out to our financial services at [email or web address]    CPT code for patch testing: [id number] (Contact your insurance provider to ensure coverage)    Customer Service    Ph: [phone] or  [phone]    Hours of operation:   - 8:00am - 5:30pm  F 9:00am - 4:30pm    Cost of Care/Estimates Team    Ph: [phone]    Hours of operation:   - 8:00am - 3:00pm  F 9:00am - 3:00pm

## 2024-09-26 ENCOUNTER — MYC MEDICAL ADVICE (OUTPATIENT)
Dept: FAMILY MEDICINE | Facility: CLINIC | Age: 34
End: 2024-09-26
Payer: COMMERCIAL

## 2024-09-26 ENCOUNTER — TELEPHONE (OUTPATIENT)
Dept: DERMATOLOGY | Facility: CLINIC | Age: 34
End: 2024-09-26
Payer: COMMERCIAL

## 2024-09-26 DIAGNOSIS — L20.89 OTHER ATOPIC DERMATITIS: ICD-10-CM

## 2024-09-26 DIAGNOSIS — L24.9 IRRITANT DERMATITIS: ICD-10-CM

## 2024-09-26 DIAGNOSIS — L23.5 ALLERGIC DERMATITIS DUE TO OTHER CHEMICAL PRODUCT: ICD-10-CM

## 2024-09-26 NOTE — TELEPHONE ENCOUNTER
Retail Pharmacy Prior Authorization Team   Phone: [phone]    PRIOR AUTHORIZATION DENIED    Medication: PIMECROLIMUS 1 % EX CREA  Insurance Company: [employer] - Phone [phone] Fax [phone]  Denial Date: 9/26/2024  Denial Reason(s): PRODUCT IS NOT COVERED      Appeal Information: IF THE PROVIDER WOULD LIKE TO APPEAL THIS DECISION PLEASE PROVIDE THE PA TEAM WITH A LETTER OF MEDICAL NECESSITY      Patient Notified: NO

## 2024-09-26 NOTE — TELEPHONE ENCOUNTER
Prior Authorization Approval    Medication: PIMECROLIMUS 1 % EX CREA  Authorization Effective Date: 8/27/2024  Authorization Expiration Date: 9/26/2025   Insurance Company: [email or web address] - Phone [phone] Fax [phone]  Which Pharmacy is filling the prescription: [id number] - [place], [place] - [address]  Pharmacy Notified: YES  Patient Notified: YES (faxed approval letter  to pharmacy and notified patient via [employer] message)

## 2024-09-26 NOTE — TELEPHONE ENCOUNTER
INSURANCE PREFERS BRAND  RESUBMITTED FOR BRAND VIA CM -     PA Initiation    Medication: PIMECROLIMUS 1 % EX CREA  Insurance Company: [employer] - Phone [phone] Fax [phone]  Pharmacy Filling the Rx: [id number] - [place], [place] - [address]  Filling Pharmacy Phone: [phone]  Filling Pharmacy Fax:    Start Date: 9/26/2024

## 2024-10-01 RX ORDER — PIMECROLIMUS 10 MG/G
CREAM TOPICAL 2 TIMES DAILY
Qty: 30 G | Refills: 2 | Status: SHIPPED | OUTPATIENT
Start: 2024-10-01

## 2024-10-11 DIAGNOSIS — L24.9 IRRITANT DERMATITIS: ICD-10-CM

## 2024-10-11 DIAGNOSIS — L23.5 ALLERGIC DERMATITIS DUE TO OTHER CHEMICAL PRODUCT: ICD-10-CM

## 2024-10-11 DIAGNOSIS — L20.89 OTHER ATOPIC DERMATITIS: ICD-10-CM

## 2024-10-11 RX ORDER — PIMECROLIMUS 10 MG/G
CREAM TOPICAL 2 TIMES DAILY
Qty: 30 G | Refills: 2 | Status: SHIPPED | OUTPATIENT
Start: 2024-10-11

## 2025-03-11 NOTE — PROGRESS NOTES
[facility] Dermato-allergology Note  Virtual visit: store and forward video ([employer] connected), start time: 09:40, end time: 09:55, date of images: video  Encounter Date: Mar 12, 2025    Virtual Visit Details  Type of service:  Video Visit   Originating Location (pt. Location): Home    Distant Location (provider location):  On-site  Platform used for Video Visit: AmWell   ____________________________________________    CC: Allergy Testing Followup (Video visit for check in prior to patch testing. Still having some itchiness and flares, wants patch testing still. Did stop using creams and ointments daily at the beginning of the year and just using as needed now.)      HPI:  (Mar 12, 2025)  Ms. Bess Ellis is a(n) 35 year old female who presents today for follow-up  for allergy consultation  - Follow-up in Derm-Allergy clinic in spring 2025 for allergy tests as planned, with check-in (can be virtual or in person) 2 weeks before to determine if patch tests are still necessary  - Reports she is doing better  - Had been putting on topicals (including Elidel) consistently every day until start of 2025, and then has gradually lessened use to just PRN  - Still having some itchiness and irritation on hands, face (around eyes and mouth), just not as severe  - Using Elidel on face  - Was using Elidel on hands, but switched to Protopic  - Otherwise feeling well in usual state of health    Physical Exam:  General: In no acute distress, well-developed, well-nourished  Eyes: no conjunctivitis  ENT: no signs of rhinitis   Pulmonary: no wheezing or coughing  Skin: Focused examination of the face and hands was performed.  - Little bit of erythema with some papules on the base of the nose.  - On the back of the hands over the knuckles mostly, there is xerosis.    Earlier History and Allergy Exams:  (Sep 25, 2024)  New patient for allergy consultation  - Self-referred primarily for spreading hives, but also for  "perioral dermatitis and eczema  - No prior allergy records in [facility]  - However, multiple claims-derived visit encounters (without actual visit notes) in Epic from 1/17/19 to 1/25/24 with Dr. Ayesha Hernandez ([facility]) or various dermatology concerns, with rash and dermatitis as dx starting 6/10/19  - Additional claims-derived visit on 3/28/24 with Dr. Zion Zabala ([facility]) for idiopathic urticaria, dermatitis, pruritus, and dyshidrosis  - Then 5/6/24 and 6/24/24 claims-derived visits with Dr. Lacie Hinds ([facility]) for idiopathic urticaria, perioral dermatitis, pruritus, and dyshidrosis  - Starting 1/2024  - Face, eyes, mouth, neck, and chest, abdomen, and later on occasionally on legs  - Would have tiny little bumps that became really itchy and red  - Would last for a couple days  - Denies skin being dry or scaly  - Also has hand eczema  - Also has perioral dermatitis  - Has tried Protopic (face), steroids, antihistamines, doxycycline, and metronidazole  - 1st tried topical steroids on face  - Then doxycycline  - When stopped doxycycline, couldn't sleep at night with itchy face  - At that time told to stop topical steroids on face, and given 2nd course of doxycycline: BID x 1 month, and the QD x 1 month  - Started 3rd month of doxycyline, has started to taper off, and sometimes feels a little itchy  - Uses CeraVe face wash; does not use BPO (tried years ago and had a reaction)  - Last dyed her hair ~1 year ago  - Has chronically dry eyes  - Skin care routine is \"basic\": includes CeraVe as above, tretinoin, vitamin C and eye creams    Focused examination of the face and hands was performed.  - Today, on her hands, there is a slightly erythematous xerotic 5 cm x 3 cm plaque on right dorsal palm, and slightly less on left hand. Back of hands are xerotic.   - 1/2/2024 photos on patient's phone: Papules around the mouth on the chin area.  - 5/2/24 photo on patient's phone: Clear perioral " dermatitis with typical pustules around both corners of the mouth. She reports she heard about treatment for atopic dermatitis with Appleby's soap and azelaic acid, which she was using at this time.    Past Medical History:   Patient Active Problem List   Diagnosis    IUD contraception    Other irritable bowel syndrome    ASCUS with positive high risk HPV cervical    Acne vulgaris    Dysmenorrhea    Food protein induced enterocolitis syndrome    Constipation, unspecified constipation type    Dyspareunia in female    Mild major depression     Past Medical History:   Diagnosis Date    Abnormal Pap smear of cervix 02/28/2018 02/28/18: see problem list.    Cervical high risk HPV (human papillomavirus) test positive 03/05/2019 03/05/19: See problem list.     Depressive disorder 2021     Allergies:  No Known Allergies    Medications:  Current Outpatient Medications   Medication Sig Dispense Refill    Cholecalciferol (VITAMIN D-3 PO) Take 125 mcg by mouth daily      Cyanocobalamin (B-12 PO) Take 1 tablet by mouth daily      hydrocortisone 2.5 % cream Apply 1 Application topically as needed      levonorgestrel (MIRENA) 20 MCG/DAY IUD 1 each by Intrauterine route once (Liletta IUD - placed at [facility] in 11/21)      Magnesium 300 MG CAPS Take 1 tablet by mouth daily      metroNIDAZOLE (METROCREAM) 0.75 % external cream       Omega-3 Fatty Acids (FISH OIL) 1200 MG capsule Take 1,200 mg by mouth daily      pimecrolimus (ELIDEL) 1 % external cream Apply topically 2 times daily. On face (is less greasy than Protopic) and therefore better for dermatitis in face 30 g 2    Probiotic Product (PROBIOTIC DAILY PO)       spironolactone (ALDACTONE) 50 MG tablet Take 50 mg by mouth daily Through dermatology      tacrolimus (PROTOPIC) 0.1 % external ointment       tretinoin (RETIN-A) 0.01 % external gel Apply topically At Bedtime       No current facility-administered medications for this visit.     Family  History:  Family History   Problem Relation Age of Onset    Cancer Mother 57        CLL, donig well, no txt    Gastrointestinal Disease Mother         IBS    Other Cancer Mother         CLL    Gastrointestinal Disease Sister         IBS    Depression Sister     Anxiety Disorder Sister     Cancer Maternal Grandmother 65        CML, had txt,  at 90, unsure cause of death    Coronary Artery Disease Maternal Grandfather         later in life    Colon Cancer Paternal Grandmother         60s    Coronary Artery Disease Paternal Grandfather         later in life    Colon Cancer Paternal Grandfather         60s    Hypertension Father     Cerebrovascular Disease Father     Anxiety Disorder Father     Glaucoma No family hx of     Macular Degeneration No family hx of    Positive for allergies. Negative for eczema and asthma.    Previous Labs, Allergy Tests, Dermatopathology, Imaging:  See HPI - no records in Epic as of 24.    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 Does not wear gloves at work.  Patient has the following hobbies or non-occupational exposure: weightlifting (will apply , she is unsure of material) and dancing.    Order for Future Allergy Testing:    [x] Outpatient  [] Inpatient: [facility]..../ Bed ...    Skin Atopy (atopic dermatitis)? [x] Yes     [] No  Comments: diagnosed by outside dermatology - see HPI  Childhood eczema?   [] Yes     [x] No  Comments:   Hand eczema?   [x] Yes     [] No  If yes, leading hand? [] Right     [] Left     [] Ambidextrous  Comments: see HPI    Contact allergies?   [x] Yes     [] No  Comments:   Including adhesives/bandages? [] Yes     [x] No  Comments:   Including metals?   [x] Yes     [] No  Comments: earrings: sometimes would have itchy ears with slight redness and swelling when wearing cheap jewelry    Drug allergies?   [] Yes     [x] No  Comments:     Angioedema?   [] Yes     []  No  Comments:     Urticaria?   [x] Yes     [] No  Comments: see HPI - reason for visit    Food allergies?   [] Yes     [x] No  Comments: eats cherries daily without mouth/throat itching    Pet allergies?   [] Yes     [x] No  Comments: just got a lab retriever puppy 2 days ago; does not have symptoms around; sneezing if she touches cats    Environmental allergy symptoms?  [] Conjunctivitis  [] Otitis  [] Pharyngitis  [] Polyposis  [] Postnasal Drip  [x] Rhinitis - slightly stuffy  [] Sinusitis  [] None  Comments:     HENT Operations?  [] Adenoids [] Septum [] Sinus  [] Tonsils        [] Other:   [] None  Comments:     Pulmonary symptoms (from birth to present)?  [] Asthma bronchiale  Inhaler(s)?:   [] Coughing  [] Other  [x] None  Comments:     Environmental and pulmonary symptoms aggravated by?  Season: [] None     [] I     [] II     [x] III     [x] IV     [x] V     [] VI          [] VII     [x] VIII     [x] IX     [x] X     [] XI     [] XII     [] Perennial  Time of Day: [] None     [x] Morning (once moving to new house recently)     [] Noon     [] Evening     [] Night     [] Whole Day  Location/Changes: [] None     [] Inside     [] Outside     [] Mountain     [] Sea     [] Other:   Triggers (specific): [] None     [] Animals     [] Dust     [] Mold     [] Plants     [] Other:   Triggers (other): [] None     [] Psyche     [] Sport     [] Work     [] Other:   Irritant: [] None     [] Cold     [] Heat     [] Odors     [] Physical Efforts     [] Smoke     [] Other:     Order for PATCH TESTS  Reason for tests (suspected allergy): recurrent therapy-resistant perioral dermatitis  Known previous allergies: none  Standardized panels  [x] Standard panel (40 tests)  [x] Preservatives & Antimicrobials (31 tests)  [x] Emulsifiers & Additives (25 tests)   [x] Perfumes/Flavours & Plants (25 tests)  [x] Hairdresser panel (12 tests)  [] Rubber Chemicals (22 tests)  [] Plastics (26 tests)  [x] Colorants/Dyes/Food additives (20  tests)  [] Metals (implants/dental) (24 tests)  [] Local anaesthetics/NSAIDs (13 tests)  [] Antibiotics & Antimycotics (14 tests)   [] Corticosteroids (15 tests)   [] Photopatch test (62 tests)   [] Others:     [] Patient's Own Products:   DO NOT test if chemical or biological identity is unknown!   always ask from patient the product information and safety sheets (MSDS)       Order for PRICK TESTS    Reason for tests (suspected allergy): atopy?  Known previous allergies: none    Standardized prick panels  [x] Atopic panel (20 tests)  [] Pediatric Panel (12 tests)  [] Milk, Meat, Eggs, Grains (20 tests)   [] Dust, Epithelia, Feathers (10 tests)  [] Fish, Seafood, Shellfish (17 tests)  [] Nuts, Beans (14 tests)  [] Spice, Vegetable, Fruit (17 tests)  [] Pollen Panel = Tree, Grass, Weed (24 tests)  [] Others:     [] Patient's Own Products:   DO NOT test if chemical or biological identity is unknown!   always ask from patient the product information and safety sheets (MSDS)     Standardized intradermal tests  [] Alternaria alternata  [] Aspergillus fumigatus  [] Cladosporium herbarum   [] Penicillium notatum  [] Dermatophagoides farinae  [] Dermatophagoides pteronyssinus  [] Dog Epithelium  [] Cat Epithelium  [] Others:     [] Bee venom   [] Wasp venom  !!Specific protocol with dilutions!!       Order for Drug allergy tests (prick & intradermal & patch tests)    [] Penicillin G     [] Ampicillin   [] Cefazolin        [] Ceftriaxone     [] Ceftazidime     [] Cefepime     [] Cefuroxime  [] Bactrim  [] Iodixanol     [] Iopamidol        [] Iohexol  [] Others:   [] Patient's Own:   Order for ... as test date      ____________________________________________      Atopy Screen (Placed Sep 25, 2024)  No Substance Readings (15 min) Evaluation   POS Histamine 1mg/ml +/++    NEG NaCl 0.9% -      No Substance Readings (15 min) Evaluation   1 Alternaria alternata (tenuis)  +    2 Cladosporium herbarum +    3 Aspergillus fumigatus -     4 Penicillium notatum -    5 Dermatophagoides pteronyssinus ++    6 Dermatophagoides farinae +++    7 Dog epithelium (canis spp) +    8 Cat hair (tiffanie catus) ++    9 Cockroach   (Blatella americana & germanica) -    10 Grass mix midwest   (Estefani, Orchard, Redtop, Agus) -    11 Jonh grass (sorghum halepense) -    12 Weed mix   (common Cocklebur, Lamb s quarters, rough redroot Pigweed, Dock/Sorrel) -    13 Mug wort (artemisia vulgare) +    14 Ragweed giant/short (ambrosia spp) +++    15 White birch (Betula papyrifera) +++    16 Tree mix 1 (Pecan, Maple BHR, Oak RVW, american [place], black [place]) +    17 Red cedar (juniperus virginia) -    18 Tree mix 2   (white Nabil, river/red Birch, black [place], common [place], american Elm) +/++    19 Box elder/Maple mix (acer spp) -    20 Glades shagbark (carya ovata) -    Conclusion:      ____________________________________________      Assessment & Plan:    ==> Final Diagnosis:     # Recurrent therapy-resistant perioral dermatitis  DDx: Additionally irritant dermatitis with atopy  DDx: Allergic contact dermatitis   * chronic illness with exacerbation, progression, side effects from treatment    # Atopic predisposition with:   Slight morning rhinitis with strong dust mite sensitization  Seasonal rhinitis in spring (birch pollen) and fall (ragweed and mug wort)  Hypersensitive and hyperirritable skin  Possible nickel allergy  Rhinitis around cats --> prick test positive  * stable chronic illness    # Subacute to chronic eczema on back of hands  DDx: Irritant dermatitis with atopy  DDx: Allergic contact dermatitis   * chronic illness with exacerbation, progression, side effects from treatment    # 1x episode of urticaria in 1/2024. Resolved.    These conclusions are made at the best of one's knowledge and belief based on the provided evidence such as patient's history and allergy test results and they can change over time or can be incomplete because of missing  information.    ==> Treatment Plan:    >> Patch tests are appropriate, as dermatitis has improved, but she still has flares. Until patch tests can be completed:  - Encouraged daily moisturization of face and body with a gentle moisturizer, such as Vanicream, Cetaphil, or CeraVe.  - Continue with CeraVe facial wash.  - Continue with Elidel: apply BID on face, hands, and rest of body if skin is red.   - Continue with Protopic BID on hands.  - Once face is no longer as red, for maintenance:  - Apply MetroCream prescribed by [facility], and then Elidel QPM.  - Allergy RN will send CPT codes for planned allergy tests via [employer] at patient's request.    >> For house dust mite allergy:  - Continue with for house dust mite reduction.  - Can take Claritin or Zyrtec before she goes to bed.    Procedures Performed: None    Staff and Scribe: provider    Scribe Disclosure:   I, NOVA KIRKPATRICK, am serving as a scribe to document services personally performed by See Hawkins MD based on data collection and the provider's statements to me.     Staff Physician Comments:  I was present with the scribe who participated in the documentation of the note. I have verified the history and personally performed the physical exam and medical decision making. I agree with the assessment and plan as documented in the note. I have reviewed and if necessary amended the note.      See Hawkins MD  Professor  Head of Dermato-Allergy Division  Department of Dermatology  [facility]    Follow-up in Derm-Allergy clinic for patch tests as planned for week of 3/31/25  ___________________________    I spent a total of 15 minutes with Bess Ellis during today s visit. This time was spent discussing all the individual test results, correlating them to the clinical relevance, counseling the patient and/or coordinating care and performing allergy tests or procedures.

## 2025-03-12 ENCOUNTER — VIRTUAL VISIT (OUTPATIENT)
Dept: ALLERGY | Facility: CLINIC | Age: 35
End: 2025-03-12
Payer: COMMERCIAL

## 2025-03-12 DIAGNOSIS — L20.89 OTHER ATOPIC DERMATITIS: Primary | ICD-10-CM

## 2025-03-12 DIAGNOSIS — Z88.9 ATOPY: ICD-10-CM

## 2025-03-12 DIAGNOSIS — L24.9 IRRITANT DERMATITIS: ICD-10-CM

## 2025-03-12 DIAGNOSIS — L71.0 PERIORAL DERMATITIS: ICD-10-CM

## 2025-03-12 DIAGNOSIS — Z91.09 HOUSE DUST MITE ALLERGY: ICD-10-CM

## 2025-03-12 NOTE — LETTER
3/12/2025      Bess Ellis  [address]  [facility] [address]      Dear Colleague,    Thank you for referring your patient, Bess Ellis, to the [facility] [place]. Please see a copy of my visit note below.    [facility] Dermato-allergology Note  Virtual visit: store and forward video ("[employer]t connected), start time: 09:40, end time: 09:55, date of images: video  Encounter Date: Mar 12, 2025    Virtual Visit Details  Type of service:  Video Visit   Originating Location (pt. Location): Home    Distant Location (provider location):  On-site  Platform used for Video Visit: [employer]   ____________________________________________    CC: Allergy Testing Followup (Video visit for check in prior to patch testing. Still having some itchiness and flares, wants patch testing still. Did stop using creams and ointments daily at the beginning of the year and just using as needed now.)      HPI:  (Mar 12, 2025)  Ms. Bess Ellis is a(n) 35 year old female who presents today for follow-up  for allergy consultation  - Follow-up in Derm-Allergy clinic in spring 2025 for allergy tests as planned, with check-in (can be virtual or in person) 2 weeks before to determine if patch tests are still necessary  - Reports she is doing better  - Had been putting on topicals (including Elidel) consistently every day until start of 2025, and then has gradually lessened use to just PRN  - Still having some itchiness and irritation on hands, face (around eyes and mouth), just not as severe  - Using Elidel on face  - Was using Elidel on hands, but switched to Protopic  - Otherwise feeling well in usual state of health    Physical Exam:  General: In no acute distress, well-developed, well-nourished  Eyes: no conjunctivitis  ENT: no signs of rhinitis   Pulmonary: no wheezing or coughing  Skin: Focused examination of the face and hands was performed.  - Little bit of erythema with some papules on  "the base of the nose.  - On the back of the hands over the knuckles mostly, there is xerosis.    Earlier History and Allergy Exams:  (Sep 25, 2024)  New patient for allergy consultation  - Self-referred primarily for spreading hives, but also for perioral dermatitis and eczema  - No prior allergy records in [facility]  - However, multiple claims-derived visit encounters (without actual visit notes) in Epic from 1/17/19 to 1/25/24 with Dr. Ayesha Hernandez ([facility]) or various dermatology concerns, with rash and dermatitis as dx starting 6/10/19  - Additional claims-derived visit on 3/28/24 with Dr. Zion Zabala ([facility]) for idiopathic urticaria, dermatitis, pruritus, and dyshidrosis  - Then 5/6/24 and 6/24/24 claims-derived visits with Dr. Lacie Hinds ([facility]) for idiopathic urticaria, perioral dermatitis, pruritus, and dyshidrosis  - Starting 1/2024  - Face, eyes, mouth, neck, and chest, abdomen, and later on occasionally on legs  - Would have tiny little bumps that became really itchy and red  - Would last for a couple days  - Denies skin being dry or scaly  - Also has hand eczema  - Also has perioral dermatitis  - Has tried Protopic (face), steroids, antihistamines, doxycycline, and metronidazole  - 1st tried topical steroids on face  - Then doxycycline  - When stopped doxycycline, couldn't sleep at night with itchy face  - At that time told to stop topical steroids on face, and given 2nd course of doxycycline: BID x 1 month, and the QD x 1 month  - Started 3rd month of doxycyline, has started to taper off, and sometimes feels a little itchy  - Uses CeraVe face wash; does not use BPO (tried years ago and had a reaction)  - Last dyed her hair ~1 year ago  - Has chronically dry eyes  - Skin care routine is \"basic\": includes CeraVe as above, tretinoin, vitamin C and eye creams    Focused examination of the face and hands was performed.  - Today, on her hands, there is a slightly erythematous " xerotic 5 cm x 3 cm plaque on right dorsal palm, and slightly less on left hand. Back of hands are xerotic.   - 1/2/2024 photos on patient's phone: Papules around the mouth on the chin area.  - 5/2/24 photo on patient's phone: Clear perioral dermatitis with typical pustules around both corners of the mouth. She reports she heard about treatment for atopic dermatitis with Wade Hampton's soap and azelaic acid, which she was using at this time.    Past Medical History:   Patient Active Problem List   Diagnosis     IUD contraception     Other irritable bowel syndrome     ASCUS with positive high risk HPV cervical     Acne vulgaris     Dysmenorrhea     Food protein induced enterocolitis syndrome     Constipation, unspecified constipation type     Dyspareunia in female     Mild major depression     Past Medical History:   Diagnosis Date     Abnormal Pap smear of cervix 02/28/2018 02/28/18: see problem list.     Cervical high risk HPV (human papillomavirus) test positive 03/05/2019 03/05/19: See problem list.      Depressive disorder 2021     Allergies:  No Known Allergies    Medications:  Current Outpatient Medications   Medication Sig Dispense Refill     Cholecalciferol (VITAMIN D-3 PO) Take 125 mcg by mouth daily       Cyanocobalamin (B-12 PO) Take 1 tablet by mouth daily       hydrocortisone 2.5 % cream Apply 1 Application topically as needed       levonorgestrel (MIRENA) 20 MCG/DAY IUD 1 each by Intrauterine route once (Liletta IUD - placed at [facility] in 11/21)       Magnesium 300 MG CAPS Take 1 tablet by mouth daily       metroNIDAZOLE (METROCREAM) 0.75 % external cream        Omega-3 Fatty Acids (FISH OIL) 1200 MG capsule Take 1,200 mg by mouth daily       pimecrolimus (ELIDEL) 1 % external cream Apply topically 2 times daily. On face (is less greasy than Protopic) and therefore better for dermatitis in face 30 g 2     Probiotic Product (PROBIOTIC DAILY PO)        spironolactone (ALDACTONE) 50  MG tablet Take 50 mg by mouth daily Through dermatology       tacrolimus (PROTOPIC) 0.1 % external ointment        tretinoin (RETIN-A) 0.01 % external gel Apply topically At Bedtime       No current facility-administered medications for this visit.     Family History:  Family History   Problem Relation Age of Onset     Cancer Mother 57        CLL, donig well, no txt     Gastrointestinal Disease Mother         IBS     Other Cancer Mother         CLL     Gastrointestinal Disease Sister         IBS     Depression Sister      Anxiety Disorder Sister      Cancer Maternal Grandmother 65        CML, had txt,  at 90, unsure cause of death     Coronary Artery Disease Maternal Grandfather         later in life     Colon Cancer Paternal Grandmother         60s     Coronary Artery Disease Paternal Grandfather         later in life     Colon Cancer Paternal Grandfather         60s     Hypertension Father      Cerebrovascular Disease Father      Anxiety Disorder Father      Glaucoma No family hx of      Macular Degeneration No family hx of    Positive for allergies. Negative for eczema and asthma.    Previous Labs, Allergy Tests, Dermatopathology, Imaging:  See HPI - no records in Epic as of 24.    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 Does not wear gloves at work.  Patient has the following hobbies or non-occupational exposure: weightlifting (will apply , she is unsure of material) and dancing.    Order for Future Allergy Testing:    [x] Outpatient  [] Inpatient: [facility]..../ Bed ...    Skin Atopy (atopic dermatitis)? [x] Yes     [] No  Comments: diagnosed by outside dermatology - see HPI  Childhood eczema?   [] Yes     [x] No  Comments:   Hand eczema?   [x] Yes     [] No  If yes, leading hand? [] Right     [] Left     [] Ambidextrous  Comments: see HPI    Contact allergies?   [x] Yes     [] No  Comments:   Including  adhesives/bandages? [] Yes     [x] No  Comments:   Including metals?   [x] Yes     [] No  Comments: earrings: sometimes would have itchy ears with slight redness and swelling when wearing cheap jewelry    Drug allergies?   [] Yes     [x] No  Comments:     Angioedema?   [] Yes     [] No  Comments:     Urticaria?   [x] Yes     [] No  Comments: see HPI - reason for visit    Food allergies?   [] Yes     [x] No  Comments: eats cherries daily without mouth/throat itching    Pet allergies?   [] Yes     [x] No  Comments: just got a lab retriever puppy 2 days ago; does not have symptoms around; sneezing if she touches cats    Environmental allergy symptoms?  [] Conjunctivitis  [] Otitis  [] Pharyngitis  [] Polyposis  [] Postnasal Drip  [x] Rhinitis - slightly stuffy  [] Sinusitis  [] None  Comments:     HENT Operations?  [] Adenoids [] Septum [] Sinus  [] Tonsils        [] Other:   [] None  Comments:     Pulmonary symptoms (from birth to present)?  [] Asthma bronchiale  Inhaler(s)?:   [] Coughing  [] Other  [x] None  Comments:     Environmental and pulmonary symptoms aggravated by?  Season: [] None     [] I     [] II     [x] III     [x] IV     [x] V     [] VI          [] VII     [x] VIII     [x] IX     [x] X     [] XI     [] XII     [] Perennial  Time of Day: [] None     [x] Morning (once moving to new house recently)     [] Noon     [] Evening     [] Night     [] Whole Day  Location/Changes: [] None     [] Inside     [] Outside     [] Mountain     [] Sea     [] Other:   Triggers (specific): [] None     [] Animals     [] Dust     [] Mold     [] Plants     [] Other:   Triggers (other): [] None     [] Psyche     [] Sport     [] Work     [] Other:   Irritant: [] None     [] Cold     [] Heat     [] Odors     [] Physical Efforts     [] Smoke     [] Other:     Order for PATCH TESTS  Reason for tests (suspected allergy): recurrent therapy-resistant perioral dermatitis  Known previous allergies: none  Standardized panels  [x]  Standard panel (40 tests)  [x] Preservatives & Antimicrobials (31 tests)  [x] Emulsifiers & Additives (25 tests)   [x] Perfumes/Flavours & Plants (25 tests)  [x] Hairdresser panel (12 tests)  [] Rubber Chemicals (22 tests)  [] Plastics (26 tests)  [x] Colorants/Dyes/Food additives (20 tests)  [] Metals (implants/dental) (24 tests)  [] Local anaesthetics/NSAIDs (13 tests)  [] Antibiotics & Antimycotics (14 tests)   [] Corticosteroids (15 tests)   [] Photopatch test (62 tests)   [] Others:     [] Patient's Own Products:   DO NOT test if chemical or biological identity is unknown!   always ask from patient the product information and safety sheets (MSDS)       Order for PRICK TESTS    Reason for tests (suspected allergy): atopy?  Known previous allergies: none    Standardized prick panels  [x] Atopic panel (20 tests)  [] Pediatric Panel (12 tests)  [] Milk, Meat, Eggs, Grains (20 tests)   [] Dust, Epithelia, Feathers (10 tests)  [] Fish, Seafood, Shellfish (17 tests)  [] Nuts, Beans (14 tests)  [] Spice, Vegetable, Fruit (17 tests)  [] Pollen Panel = Tree, Grass, Weed (24 tests)  [] Others:     [] Patient's Own Products:   DO NOT test if chemical or biological identity is unknown!   always ask from patient the product information and safety sheets (MSDS)     Standardized intradermal tests  [] Alternaria alternata  [] Aspergillus fumigatus  [] Cladosporium herbarum   [] Penicillium notatum  [] Dermatophagoides farinae  [] Dermatophagoides pteronyssinus  [] Dog Epithelium  [] Cat Epithelium  [] Others:     [] Bee venom   [] Wasp venom  !!Specific protocol with dilutions!!       Order for Drug allergy tests (prick & intradermal & patch tests)    [] Penicillin G     [] Ampicillin   [] Cefazolin        [] Ceftriaxone     [] Ceftazidime     [] Cefepime     [] Cefuroxime  [] Bactrim  [] Iodixanol     [] Iopamidol        [] Iohexol  [] Others:   [] Patient's Own:   Order for ... as test  date      ____________________________________________      Atopy Screen (Placed Sep 25, 2024)  No Substance Readings (15 min) Evaluation   POS Histamine 1mg/ml +/++    NEG NaCl 0.9% -      No Substance Readings (15 min) Evaluation   1 Alternaria alternata (tenuis)  +    2 Cladosporium herbarum +    3 Aspergillus fumigatus -    4 Penicillium notatum -    5 Dermatophagoides pteronyssinus ++    6 Dermatophagoides farinae +++    7 Dog epithelium (canis spp) +    8 Cat hair (tiffanie catus) ++    9 Cockroach   (Blatella americana & germanica) -    10 Grass mix midwest   (Estefani, Orchard, Redtop, Agus) -    11 Jonh grass (sorghum halepense) -    12 Weed mix   (common Cocklebur, Lamb s quarters, rough redroot Pigweed, Dock/Sorrel) -    13 Mug wort (artemisia vulgare) +    14 Ragweed giant/short (ambrosia spp) +++    15 White birch (Betula papyrifera) +++    16 Tree mix 1 (Pecan, Maple BHR, Oak RVW, american [place], black [place]) +    17 Red cedar (juniperus virginia) -    18 Tree mix 2   (white Nabil, river/red Birch, black [place], common Howell, american Elm) +/++    19 Box elder/Maple mix (acer spp) -    20 [place] shagbark (carya ovata) -    Conclusion:      ____________________________________________      Assessment & Plan:    ==> Final Diagnosis:     # Recurrent therapy-resistant perioral dermatitis  DDx: Additionally irritant dermatitis with atopy  DDx: Allergic contact dermatitis   * chronic illness with exacerbation, progression, side effects from treatment    # Atopic predisposition with:   Slight morning rhinitis with strong dust mite sensitization  Seasonal rhinitis in spring (birch pollen) and fall (ragweed and mug wort)  Hypersensitive and hyperirritable skin  Possible nickel allergy  Rhinitis around cats --> prick test positive  * stable chronic illness    # Subacute to chronic eczema on back of hands  DDx: Irritant dermatitis with atopy  DDx: Allergic contact dermatitis   * chronic illness with  exacerbation, progression, side effects from treatment    # 1x episode of urticaria in 1/2024. Resolved.    These conclusions are made at the best of one's knowledge and belief based on the provided evidence such as patient's history and allergy test results and they can change over time or can be incomplete because of missing information.    ==> Treatment Plan:    >> Patch tests are appropriate, as dermatitis has improved, but she still has flares. Until patch tests can be completed:  - Encouraged daily moisturization of face and body with a gentle moisturizer, such as Vanicream, Cetaphil, or CeraVe.  - Continue with CeraVe facial wash.  - Continue with Elidel: apply BID on face, hands, and rest of body if skin is red.   - Continue with Protopic BID on hands.  - Once face is no longer as red, for maintenance:  - Apply MetroCream prescribed by [facility], and then Elidel QPM.  - Allergy RN will send CPT codes for planned allergy tests via [facility] at patient's request.    >> For house dust mite allergy:  - Continue with for house dust mite reduction.  - Can take Claritin or Zyrtec before she goes to bed.    Procedures Performed: None    Staff and Scribe: provider    Scribe Disclosure:   I, NOVA KIRKPATRICK, am serving as a scribe to document services personally performed by See Hawkins MD based on data collection and the provider's statements to me.     Staff Physician Comments:  I was present with the scribe who participated in the documentation of the note. I have verified the history and personally performed the physical exam and medical decision making. I agree with the assessment and plan as documented in the note. I have reviewed and if necessary amended the note.      See Hawkins MD  Professor  Head of Dermato-Allergy Division  Department of Dermatology  [facility]    Follow-up in Derm-Allergy clinic for patch tests as planned for week of  3/31/25  ___________________________    I spent a total of 15 minutes with Bess M Timoteotremaine during today s visit. This time was spent discussing all the individual test results, correlating them to the clinical relevance, counseling the patient and/or coordinating care and performing allergy tests or procedures.       Again, thank you for allowing me to participate in the care of your patient.        Sincerely,        See Hawkins MD    Electronically signed

## 2025-03-12 NOTE — NURSING NOTE
Chief Complaint   Patient presents with    Allergy Testing Followup     Video visit for check in prior to patch testing. Still having some itchiness and flares, wants patch testing still. Did stop using creams and ointments daily at the beginning of the year and just using as needed now.     Trisha Clayton RN

## 2025-03-12 NOTE — PATIENT INSTRUCTIONS
You can always access your most recent office visit note with this allergy clinic via [facility] MyChart, which contains all of your results from testing performed by Dr. Hawkins along with the details of the discussed treatment plan.  If you do not have access to [facility], please discuss with a [facility] staff member. Additionally, if your provider is within [facility] or their EMR participates in the [email or web address] network, they can also access this information.     ____________________________________________

## 2025-03-27 ENCOUNTER — TELEPHONE (OUTPATIENT)
Dept: ALLERGY | Facility: CLINIC | Age: 35
End: 2025-03-27
Payer: COMMERCIAL

## 2025-03-27 NOTE — TELEPHONE ENCOUNTER
Standardized panels  [x] Standard panel (40 tests)  [x] Preservatives & Antimicrobials (31 tests)  [x] Emulsifiers & Additives (25 tests)   [x] Perfumes/Flavours & Plants (25 tests)  [x] Hairdresser panel (12 tests)  [] Rubber Chemicals (22 tests)  [] Plastics (26 tests)  [x] Colorants/Dyes/Food additives (20 tests)  [] Metals (implants/dental) (24 tests)  [] Local anaesthetics/NSAIDs (13 tests)  [] Antibiotics & Antimycotics (14 tests)   [] Corticosteroids (15 tests)   [] Photopatch test (62 tests)   [] Others:   STANDARD Series                                          # Substance 2 days 4 days remarks     1 Igor Mix III 10% - -       2 Colophony - -       3  2-Mercaptobenzothiazole  - -       4 Methylisothiazolinone - -       5 Carba Mix - -       6 Thiuram Mix [A] - -       7 Bisphenol A Epoxy Resin - -       8 D-Qbvp-Toucfqhvowq-Formaldehyde Resin - -       9 Mercapto Mix [A] - -       10 Black Rubber Mix- PPD [B] - -       11 Potassium Dichromate  -  -       12 Balsam of Peru (Myroxylon Pereirae Resin) - -       13 Nickel Sulphate Hexahydrate - -       14 Mixed Dialkyl Thiourea - -       15 Paraben Mix [B] - -       16 Methyldibromo Glutaronitrile - -       17 Fragrance Mix 8% - -       18 2-Bromo-2-Nitropropane-1,3-Diol (Bronopol) CT - -       19 Lyral - -       20 Tixocortol-21- Pivalate CT - -       21 Diazolidinyl urea (Germall II) - -        22 Methyl Methacrylate - -       23 Cobalt (II) Chloride Hexahydrate - -       24 Fragrance Mix II  - -       25 Compositae Mix II - -       26 Benzoyl Peroxide - -       27 Bacitracin - -       28 Formaldehyde - -       29 Methylchloroisothiazolinone / Methylisothiazolinone - -       30 Corticosteroid Mix CT - -       31 Sodium Lauryl Sulfate - -       32 Lanolin Alcohol - -       33 Turpentine - -       34 Cetylstearylalcohol - -       35 Chlorhexidine Dicluconate - -       36 Budesonide - -       37 Imidazolidinyl Urea  - -       38 Ethyl-2 Cyanoacrylate - -        39 Quaternium 15 (Dowicil 200) - -       40 Decyl Glucoside - -     Compositae Mix II - common yarrow, mountain arnica, Persian chamomile, feverfew, and the common tansy   PRESERVATIVES & ANTIMICROBIALS        # Substance 2 days 4 days remarks   41 1  1,2-Benzisothiazoline-3-One, Sodium Salt - -     2  1,3,5-Kushal (2-Hydroxyethyl) - Hexahydrotriazine (Grotan BK) - -     3 4-Jshrsqvhhgzdg-0-Nitro-1, 3-Propanediol - -     4  3, 4, 4' - Triclocarban - -    45 5 4 - Chloro - 3 - Cresol - -     6 4 - Chloro - 4 - Xylenol (PCMX) - -     7 7-Ethylbicyclooxazolidine (Bioban HS8419) - -     8 Benzalkonium Chloride CT - -     9 Benzyl Alcohol - -    50 10 Cetalkonium Chloride - -     11 Cetylpyrimidine Chloride  - -     12 Chloroacetamide - -     13 DMDM Hydantoin - -     14 Glutaraldehyde - -    55 15 Triclosan - -     16 Glyoxal Trimeric Dihydrate - -     17 Iodopropynyl Butylcarbamate - -     18 Octylisothiazoline - -     19 Acetophenone Azine - -    60 20 Bioban P 1487 (Nitrobutyl) Morpholine/(Ethylnitro-Trimethylene) Dimorpholine - -     21 Phenoxyethanol - -     22 Phenyl Salicylate - -     23 Povidone Iodine - -     24 Sodium Benzoate - -    65 25 Sodium Disulfite - -     26 Sorbic Acid - -     27 Thimerosal - -     28 Melamine Formaldehyde Resin - -     29 Ethylenediamine Dihydrochloride - -      Parabens      70 30 Butyl-P-Hydroxybenzoate - -     31 Ethyl-P-Hydroxybenzoate - -     32 Methyl-P-Hydroxybenzoate - -    73 33 Propyl-P-Hydroxybenzoate - -     EMULSIFIERS & ADDITIVES       # Substance 2 days 4 days remarks   74 1 Polyethylene Glycol-400 - -    75 2 Cocamidopropyl Betaine - -     3 Amerchol L101 - -     4 Propylene Glycol - -     5 Triethanolamine - -     6 Sorbitane Sesquiolate CT - -    80 7 Isopropylmyristate - -     8 Polysorbate 80 CT - -     9 Amidoamine   (Stearamidopropyl Dimethylamine) - -     10 Oleamidopropyl Dimethylamine - -     11 Lauryl Glucoside - -    85 12 Coconut Diethanolamide  - -     13  2-Hydroxy-4-Methoxy Benzophenone (Oxybenzone) - -     14 Benzophenone-4 (Sulisobenzon) - -     15 Propolis - -     16 Dexpanthenol - -    90 17 Abitol (Hydroabietyl Alcohol) - -     18 Tert-Butylhydroquinone - -     19 Benzyl Salicylate - -     20 Dimethylaminopropylamin (DMPA) - -     21 Zinc Pyrithione (Zinc Omadine)  - -    95 22 Kushal(Hydroxymethyl) Nitromethane  - -      Antioxidant       23 Dodecyl Gallate - -     24 Butylhydroxyanisole (BHA) - -     25 Butylhydroxytoluene (BHT) - -     26 Di-Alpha-Tocopherol (Vit E) - -    100 27 Propyl Gallate - -     PERFUMES, FLAVORS & PLANTS        # Substance 2 days 4 days remarks   101 1 Benzyl Cinnamate - -     2 Di-Limonene (Dipentene) - -     3 Cananga Odorata (Felicitas Polk) (I) - -     4 Lichen Acid Mix - -    105 5 Mentha Piperita Oil (Peppermint Oil) - -     6 Sesquiterpenelactone mix - -     7 Tea Tree Oil, Oxidized - -     8 Wood Tar Mix - -     9 Abietic Acid - -    110 10 Lavendula Angustifolia Oil (Lavender Oil) - -     11 Fragrance mix II CT * 14% - -      Fragrance Mix I       12 Oakmoss Absolute - -     13 Eugenol - -     14 Geraniol - -    115 15 Hydroxycitronellal - -     16 Isoeugenol - -     17 Cinnamic Aldehyde - -     18 Cinnamic Alcohol  - -      Fragrance mix II       19 Citronellol - -    120 20 Alpha-Hexylcinnamic Aldehyde    - -     21 Citral - -     22 Farnesol - -    123 23 Coumarin - -    Hexylcinnamic aldehyde, Coumarin, Farnesol, Hydroxyisohexy3-cyclohexene carboxaldehyde, citral, citrolellol   HAIRDRESSER        # Substance 2 days 4 days remarks   124 1 P-Phenylenediamine -  -    125 2 P-Aminodiphenylamine - -     3 P-Toluenediamine Sulphate  -  -     4 Ammonium Thioglycolate - -     5 Ammonium Persulfate - -     6 Resorcinol - -    130 7 3-Aminophenol - -     8 P-Aminophenol - -     9 Glyceryl Monothioglycolate - -    133 10 Pyrogallol - -     COLORANTS, DYES, FOOD ADDITIVES   # Substance 2 days 4 days remarks     Textile dyes      134 1  Michel Brown R - -    135 2 Disperse Blue-3 - -     3 Disperse Orange-3 - -     4 Disperse Red 1 - 1% - -     5 Disperse Yellow-9 1% - -     6 Disperse blue mix 106/124 - -    140 7 Disperse yellow 3 - -     8 Naphthol AS - -     9 Disperse Red 11 - -     10 Reactive Black 5 - -     11 Disperse Red 17 - -    145 12 Acid Yellow 61 - -     13 Acid Red 118 - -     14 Disperse Blue 35 - -     15 Basic Red 46 - -      Food dyes/additives       16 Metanil yellow (F/T) - -    150 17 Cochineal Red (F/T) - -     18 Brilliant Black - -     19 Erythrosine-B (F/T) - -     20 Aspartame - -     21 Seward (F/T) - -    155 22 Quinoline Yellow - -     23 Azorubine - -    157 24 Tartrazine - -       Results of patch tests:                         Interpretation:  - Negative                    A    = Allergic      (+) Erythema    TI   = Toxic/irritant   + E + Infiltration    RaP = Relevance at Present     ++ E/I + Papulovesicle   Rpr  = Relevance Previously     +++ E/I/P + Blister     nR   = No Relevance

## 2025-03-31 ENCOUNTER — OFFICE VISIT (OUTPATIENT)
Dept: ALLERGY | Facility: CLINIC | Age: 35
End: 2025-03-31
Payer: COMMERCIAL

## 2025-03-31 DIAGNOSIS — L71.0 PERIORAL DERMATITIS: ICD-10-CM

## 2025-03-31 DIAGNOSIS — L24.9 IRRITANT DERMATITIS: ICD-10-CM

## 2025-03-31 DIAGNOSIS — L23.5 ALLERGIC DERMATITIS DUE TO OTHER CHEMICAL PRODUCT: ICD-10-CM

## 2025-03-31 DIAGNOSIS — L20.89 OTHER ATOPIC DERMATITIS: Primary | ICD-10-CM

## 2025-03-31 DIAGNOSIS — Z91.09 HOUSE DUST MITE ALLERGY: ICD-10-CM

## 2025-03-31 DIAGNOSIS — Z88.9 ATOPY: ICD-10-CM

## 2025-03-31 PROCEDURE — 95044 PATCH/APPLICATION TESTS: CPT | Performed by: DERMATOLOGY

## 2025-03-31 NOTE — NURSING NOTE
Chief Complaint   Patient presents with    Allergy Testing Followup     Patch testing day 1     Trisha Clayton RN

## 2025-03-31 NOTE — LETTER
3/31/2025      Bess Ellis  [address]  [facility] [address]      Dear Colleague,    Thank you for referring your patient, Bess Ellis, to the [facility] [place]. Please see a copy of my visit note below.    [facility] Dermato-allergology Note  Office visit  Encounter Date: Mar 31, 2025  ____________________________________________    CC: Allergy Testing Followup (Patch testing day 1)     HPI:  (Mar 31, 2025)  Ms. Bess Ellis is a(n) 35 year old female who presents today as a return patient for allergy tests as planned  - Follow-up in Derm-Allergy clinic for patch tests as planned for week of 3/31/25  - She states her face is doing much better, but she still has some itching   - She continues to use Elidel for the face, not everyday  - She is also using Protopic for her hands  - Otherwise feeling well in usual state of health    Physical Exam:  General: In no acute distress, well-developed, well-nourished  Eyes: no conjunctivitis  ENT: no signs of rhinitis   Pulmonary: no wheezing or coughing  Skin: Focused examination of the skin on test sites was performed = see test results below  No active eczematous skin lesions on tests sites, particularly back  Focused examination of the face and hands was performed.  - Dry red skin on the knuckles  - Slight redness around the nose    Earlier History and Allergy Exams:  (Mar 12, 2025)  Presents today for follow-up  for allergy consultation  - Follow-up in Derm-Allergy clinic in spring 2025 for allergy tests as planned, with check-in (can be virtual or in person) 2 weeks before to determine if patch tests are still necessary  - Reports she is doing better  - Had been putting on topicals (including Elidel) consistently every day until start of 2025, and then has gradually lessened use to just PRN  - Still having some itchiness and irritation on hands, face (around eyes and mouth), just not as severe  - Using Elidel on  "face  - Was using Elidel on hands, but switched to Protopic     Skin: Focused examination of the face and hands was performed.  - Little bit of erythema with some papules on the base of the nose.  - On the back of the hands over the knuckles mostly, there is xerosis.     (Sep 25, 2024)  New patient for allergy consultation  - Self-referred primarily for spreading hives, but also for perioral dermatitis and eczema  - No prior allergy records in [facility]  - However, multiple claims-derived visit encounters (without actual visit notes) in Epic from 1/17/19 to 1/25/24 with Dr. Ayesha Hernandez ([facility]) or various dermatology concerns, with rash and dermatitis as dx starting 6/10/19  - Additional claims-derived visit on 3/28/24 with Dr. Zion Zabala ([facility]) for idiopathic urticaria, dermatitis, pruritus, and dyshidrosis  - Then 5/6/24 and 6/24/24 claims-derived visits with Dr. Lacie Hinds ([facility]) for idiopathic urticaria, perioral dermatitis, pruritus, and dyshidrosis  - Starting 1/2024  - Face, eyes, mouth, neck, and chest, abdomen, and later on occasionally on legs  - Would have tiny little bumps that became really itchy and red  - Would last for a couple days  - Denies skin being dry or scaly  - Also has hand eczema  - Also has perioral dermatitis  - Has tried Protopic (face), steroids, antihistamines, doxycycline, and metronidazole  - 1st tried topical steroids on face  - Then doxycycline  - When stopped doxycycline, couldn't sleep at night with itchy face  - At that time told to stop topical steroids on face, and given 2nd course of doxycycline: BID x 1 month, and the QD x 1 month  - Started 3rd month of doxycyline, has started to taper off, and sometimes feels a little itchy  - Uses CeraVe face wash; does not use BPO (tried years ago and had a reaction)  - Last dyed her hair ~1 year ago  - Has chronically dry eyes  - Skin care routine is \"basic\": includes CeraVe as above, tretinoin, vitamin " C and eye creams    Focused examination of the face and hands was performed.  - Today, on her hands, there is a slightly erythematous xerotic 5 cm x 3 cm plaque on right dorsal palm, and slightly less on left hand. Back of hands are xerotic.   - 1/2/2024 photos on patient's phone: Papules around the mouth on the chin area.  - 5/2/24 photo on patient's phone: Clear perioral dermatitis with typical pustules around both corners of the mouth. She reports she heard about treatment for atopic dermatitis with [place]'s soap and azelaic acid, which she was using at this time.    Past Medical History:   Patient Active Problem List   Diagnosis     IUD contraception     Other irritable bowel syndrome     ASCUS with positive high risk HPV cervical     Acne vulgaris     Dysmenorrhea     Food protein induced enterocolitis syndrome     Constipation, unspecified constipation type     Dyspareunia in female     Mild major depression     Past Medical History:   Diagnosis Date     Abnormal Pap smear of cervix 02/28/2018 02/28/18: see problem list.     Cervical high risk HPV (human papillomavirus) test positive 03/05/2019 03/05/19: See problem list.      Depressive disorder 2021     Allergies:  No Known Allergies    Medications:  Current Outpatient Medications   Medication Sig Dispense Refill     Cholecalciferol (VITAMIN D-3 PO) Take 125 mcg by mouth daily       Cyanocobalamin (B-12 PO) Take 1 tablet by mouth daily       hydrocortisone 2.5 % cream Apply 1 Application topically as needed       levonorgestrel (MIRENA) 20 MCG/DAY IUD 1 each by Intrauterine route once (Liletta IUD - placed at [facility] in 11/21)       Magnesium 300 MG CAPS Take 1 tablet by mouth daily       metroNIDAZOLE (METROCREAM) 0.75 % external cream        Omega-3 Fatty Acids (FISH OIL) 1200 MG capsule Take 1,200 mg by mouth daily       pimecrolimus (ELIDEL) 1 % external cream Apply topically 2 times daily. On face (is less greasy than  Protopic) and therefore better for dermatitis in face 30 g 2     Probiotic Product (PROBIOTIC DAILY PO)        spironolactone (ALDACTONE) 50 MG tablet Take 50 mg by mouth daily Through dermatology       tacrolimus (PROTOPIC) 0.1 % external ointment        tretinoin (RETIN-A) 0.01 % external gel Apply topically At Bedtime       No current facility-administered medications for this visit.     Family History:  Family History   Problem Relation Age of Onset     Cancer Mother 57        CLL, donig well, no txt     Gastrointestinal Disease Mother         IBS     Other Cancer Mother         CLL     Gastrointestinal Disease Sister         IBS     Depression Sister      Anxiety Disorder Sister      Cancer Maternal Grandmother 65        CML, had txt,  at 90, unsure cause of death     Coronary Artery Disease Maternal Grandfather         later in life     Colon Cancer Paternal Grandmother         60s     Coronary Artery Disease Paternal Grandfather         later in life     Colon Cancer Paternal Grandfather         60s     Hypertension Father      Cerebrovascular Disease Father      Anxiety Disorder Father      Glaucoma No family hx of      Macular Degeneration No family hx of    Positive for allergies. Negative for eczema and asthma.    Previous Labs, Allergy Tests, Dermatopathology, Imaging:  See HPI - no records in Epic as of 24.    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 Does not wear gloves at work.  Patient has the following hobbies or non-occupational exposure: weightlifting (will apply , she is unsure of material) and dancing.    Order for Future Allergy Testing:    [x] Outpatient  [] Inpatient: [facility]..../ Bed ...    Skin Atopy (atopic dermatitis)? [x] Yes     [] No  Comments: diagnosed by outside dermatology - see HPI  Childhood eczema?   [] Yes     [x] No  Comments:   Hand eczema?   [x] Yes     [] No  If yes, leading  hand? [] Right     [] Left     [] Ambidextrous  Comments: see HPI    Contact allergies?   [x] Yes     [] No  Comments:   Including adhesives/bandages? [] Yes     [x] No  Comments:   Including metals?   [x] Yes     [] No  Comments: earrings: sometimes would have itchy ears with slight redness and swelling when wearing cheap jewelry    Drug allergies?   [] Yes     [x] No  Comments:     Angioedema?   [] Yes     [] No  Comments:     Urticaria?   [x] Yes     [] No  Comments: see HPI - reason for visit    Food allergies?   [] Yes     [x] No  Comments: eats cherries daily without mouth/throat itching    Pet allergies?   [] Yes     [x] No  Comments: just got a lab retriever puppy 2 days ago; does not have symptoms around; sneezing if she touches cats    Environmental allergy symptoms?  [] Conjunctivitis  [] Otitis  [] Pharyngitis  [] Polyposis  [] Postnasal Drip  [x] Rhinitis - slightly stuffy  [] Sinusitis  [] None  Comments:     HENT Operations?  [] Adenoids [] Septum [] Sinus  [] Tonsils        [] Other:   [] None  Comments:     Pulmonary symptoms (from birth to present)?  [] Asthma bronchiale  Inhaler(s)?:   [] Coughing  [] Other  [x] None  Comments:     Environmental and pulmonary symptoms aggravated by?  Season: [] None     [] I     [] II     [x] III     [x] IV     [x] V     [] VI          [] VII     [x] VIII     [x] IX     [x] X     [] XI     [] XII     [] Perennial  Time of Day: [] None     [x] Morning (once moving to new house recently)     [] Noon     [] Evening     [] Night     [] Whole Day  Location/Changes: [] None     [] Inside     [] Outside     [] Mountain     [] Sea     [] Other:   Triggers (specific): [] None     [] Animals     [] Dust     [] Mold     [] Plants     [] Other:   Triggers (other): [] None     [] Psyche     [] Sport     [] Work     [] Other:   Irritant: [] None     [] Cold     [] Heat     [] Odors     [] Physical Efforts     [] Smoke     [] Other:     Order for PATCH TESTS  Reason for tests  (suspected allergy): recurrent therapy-resistant perioral dermatitis  Known previous allergies: none  Standardized panels  [x] Standard panel (40 tests)  [x] Preservatives & Antimicrobials (31 tests)  [x] Emulsifiers & Additives (25 tests)   [x] Perfumes/Flavours & Plants (25 tests)  [x] Hairdresser panel (12 tests)  [] Rubber Chemicals (22 tests)  [] Plastics (26 tests)  [x] Colorants/Dyes/Food additives (20 tests)  [] Metals (implants/dental) (24 tests)  [] Local anaesthetics/NSAIDs (13 tests)  [] Antibiotics & Antimycotics (14 tests)   [] Corticosteroids (15 tests)   [] Photopatch test (62 tests)   [] Others:     [] Patient's Own Products:   DO NOT test if chemical or biological identity is unknown!   always ask from patient the product information and safety sheets (MSDS)       Order for PRICK TESTS    Reason for tests (suspected allergy): atopy?  Known previous allergies: none    Standardized prick panels  [x] Atopic panel (20 tests)  [] Pediatric Panel (12 tests)  [] Milk, Meat, Eggs, Grains (20 tests)   [] Dust, Epithelia, Feathers (10 tests)  [] Fish, Seafood, Shellfish (17 tests)  [] Nuts, Beans (14 tests)  [] Spice, Vegetable, Fruit (17 tests)  [] Pollen Panel = Tree, Grass, Weed (24 tests)  [] Others:     [] Patient's Own Products:   DO NOT test if chemical or biological identity is unknown!   always ask from patient the product information and safety sheets (MSDS)     Standardized intradermal tests  [] Alternaria alternata  [] Aspergillus fumigatus  [] Cladosporium herbarum   [] Penicillium notatum  [] Dermatophagoides farinae  [] Dermatophagoides pteronyssinus  [] Dog Epithelium  [] Cat Epithelium  [] Others:     [] Bee venom   [] Wasp venom  !!Specific protocol with dilutions!!       Order for Drug allergy tests (prick & intradermal & patch tests)    [] Penicillin G     [] Ampicillin   [] Cefazolin        [] Ceftriaxone     [] Ceftazidime     [] Cefepime     [] Cefuroxime  [] Bactrim  [] Iodixanol      [] Iopamidol        [] Iohexol  [] Others:   [] Patient's Own:   Order for ... as test date      ____________________________________________      Atopy Screen (Placed Sep 25, 2024)  No Substance Readings (15 min) Evaluation   POS Histamine 1mg/ml +/++    NEG NaCl 0.9% -      No Substance Readings (15 min) Evaluation   1 Alternaria alternata (tenuis)  +    2 Cladosporium herbarum +    3 Aspergillus fumigatus -    4 Penicillium notatum -    5 Dermatophagoides pteronyssinus ++    6 Dermatophagoides farinae +++    7 Dog epithelium (canis spp) +    8 Cat hair (tiffanie catus) ++    9 Cockroach   (Blatella americana & germanica) -    10 Grass mix midwest   (Estefani, Orchard, Redtop, Agus) -    11 Jonh grass (sorghum halepense) -    12 Weed mix   (common Cocklebur, Lamb s quarters, rough redroot Pigweed, Dock/Sorrel) -    13 Mug wort (artemisia vulgare) +    14 Ragweed giant/short (ambrosia spp) +++    15 White birch (Betula papyrifera) +++    16 Tree mix 1 (Pecan, Maple BHR, Oak RVW, american Casey, black [place]) +    17 Red cedar (juniperus virginia) -    18 Tree mix 2   (white Nabil, river/red Birch, black [place], common [place], american Elm) +/++    19 Box elder/Maple mix (acer spp) -    20 Mize shagbark (carya ovata) -    Conclusion:      ____________________________________________      RESULTS & EVALUATION of PATCH TESTS    Mar 31, 2025 application of patch tests:    Patch test readings after     [x] 2 days, [] 3 days [x] 4 days, [] 5 days,  Other duration: ...      STANDARD Series                                          # Substance 2 days 4 days remarks     1 Igor Mix III 10% - -       2 Colophony - -       3  2-Mercaptobenzothiazole  - -       4 Methylisothiazolinone - -       5 Carba Mix - -       6 Thiuram Mix [A] - -       7 Bisphenol A Epoxy Resin - -       8 H-Ewbg-Fxzvnjpbqua-Formaldehyde Resin - -       9 Mercapto Mix [A] - -       10 Black Rubber Mix- PPD [B] - -       11 Potassium Dichromate  -   -       12 Balsam of [place] (Myroxylon Pereirae Resin) - -       13 Nickel Sulphate Hexahydrate - -       14 Mixed Dialkyl Thiourea - -       15 Paraben Mix [B] - -       16 Methyldibromo Glutaronitrile - -       17 Fragrance Mix 8% - -       18 2-Bromo-2-Nitropropane-1,3-Diol (Bronopol) CT - -       19 Lyral - -       20 Tixocortol-21- Pivalate CT - -       21 Diazolidinyl urea (Germall II) - -        22 Methyl Methacrylate - -       23 Cobalt (II) Chloride Hexahydrate - -       24 Fragrance Mix II  - -       25 Compositae Mix II - -       26 Benzoyl Peroxide - -       27 Bacitracin - -       28 Formaldehyde - -       29 Methylchloroisothiazolinone / Methylisothiazolinone - -       30 Corticosteroid Mix CT - -       31 Sodium Lauryl Sulfate - -       32 Lanolin Alcohol - -       33 Turpentine - -       34 Cetylstearylalcohol - -       35 Chlorhexidine Dicluconate - -       36 Budesonide - -       37 Imidazolidinyl Urea  - -       38 Ethyl-2 Cyanoacrylate - -       39 Quaternium 15 (Dowicil 200) - -       40 Decyl Glucoside - -     Compositae Mix II - common yarrow, mountain arnica, Libyan chamomile, feverfew, and the common tansy   PRESERVATIVES & ANTIMICROBIALS        # Substance 2 days 4 days remarks   41 1  1,2-Benzisothiazoline-3-One, Sodium Salt - -     2  1,3,5-Kushal (2-Hydroxyethyl) - Hexahydrotriazine (Grotan BK) - -     3 1-Gzhkszhxaawir-8-Nitro-1, 3-Propanediol NA NA     4  3, 4, 4' - Triclocarban - -    45 5 4 - Chloro - 3 - Cresol - -     6 4 - Chloro - 4 - Xylenol (PCMX) - -     7 7-Ethylbicyclooxazolidine (Bioban [id number]) - -     8 Benzalkonium Chloride CT - -     9 Benzyl Alcohol - -    50 10 Cetalkonium Chloride - -     11 Cetylpyrimidine Chloride  - -     12 Chloroacetamide - -     13 DMDM Hydantoin - -     14 Glutaraldehyde - -    55 15 Triclosan - -     16 Glyoxal Trimeric Dihydrate - -     17 Iodopropynyl Butylcarbamate - -     18 Octylisothiazoline - -     19 Acetophenone Azine - -    60 20 [facility]  P 1487 (Nitrobutyl) Morpholine/(Ethylnitro-Trimethylene) Dimorpholine - -     21 Phenoxyethanol - -     22 Phenyl Salicylate - -     23 Povidone Iodine - -     24 Sodium Benzoate - -    65 25 Sodium Disulfite - -     26 Sorbic Acid - -     27 Thimerosal - -     28 Melamine Formaldehyde Resin - -     29 Ethylenediamine Dihydrochloride - -      Parabens      70 30 Butyl-P-Hydroxybenzoate - -     31 Ethyl-P-Hydroxybenzoate - -     32 Methyl-P-Hydroxybenzoate - -    73 33 Propyl-P-Hydroxybenzoate - -     EMULSIFIERS & ADDITIVES       # Substance 2 days 4 days remarks   74 1 Polyethylene Glycol-400 - -    75 2 Cocamidopropyl Betaine - -     3 Amerchol L101 - -     4 Propylene Glycol - -     5 Triethanolamine - -     6 Sorbitane Sesquiolate CT - -    80 7 Isopropylmyristate - -     8 Polysorbate 80 CT - -     9 Amidoamine   (Stearamidopropyl Dimethylamine) - -     10 Oleamidopropyl Dimethylamine - -     11 Lauryl Glucoside - -    85 12 Coconut Diethanolamide  - -     13 2-Hydroxy-4-Methoxy Benzophenone (Oxybenzone) - -     14 Benzophenone-4 (Sulisobenzon) - -     15 Propolis - -     16 Dexpanthenol - -    90 17 Abitol (Hydroabietyl Alcohol) - -     18 Tert-Butylhydroquinone - -     19 Benzyl Salicylate - -     20 Dimethylaminopropylamin (DMPA) - -     21 Zinc Pyrithione (Zinc Omadine)  - -    95 22 Kushal(Hydroxymethyl) Nitromethane  - -      Antioxidant       23 Dodecyl Gallate - -     24 Butylhydroxyanisole (BHA) - -     25 Butylhydroxytoluene (BHT) - -     26 Di-Alpha-Tocopherol (Vit E) - -    100 27 Propyl Gallate - -     PERFUMES, FLAVORS & PLANTS        # Substance 2 days 4 days remarks   101 1 Benzyl Cinnamate - -     2 Di-Limonene (Dipentene) - -     3 Cananga Odorata (Ylang Ylang) (I) - -     4 Lichen Acid Mix - -    105 5 Mentha Piperita Oil (Peppermint Oil) - -     6 Sesquiterpenelactone mix - -     7 Tea Tree Oil, Oxidized - -     8 Wood Tar Mix - -     9 Abietic Acid - -    110 10 Lavendula Angustifolia Oil  (Lavender Oil) - -     11 Fragrance mix II CT * 14% - -      Fragrance Mix I       12 Oakmoss Absolute - -     13 Eugenol - -     14 Geraniol - -    115 15 Hydroxycitronellal - -     16 Isoeugenol - -     17 Cinnamic Aldehyde - -     18 Cinnamic Alcohol  - -      Fragrance mix II       19 Citronellol - -    120 20 Alpha-Hexylcinnamic Aldehyde    - -     21 Citral - -     22 Farnesol - -    123 23 Coumarin - -    Hexylcinnamic aldehyde, Coumarin, Farnesol, Hydroxyisohexy3-cyclohexene carboxaldehyde, citral, citrolellol   HAIRDRESSER        # Substance 2 days 4 days remarks   124 1 P-Phenylenediamine -  -    125 2 P-Aminodiphenylamine - -     3 P-Toluenediamine Sulphate  -  -     4 Ammonium Thioglycolate - -     5 Ammonium Persulfate - -     6 Resorcinol - -    130 7 3-Aminophenol - -     8 P-Aminophenol - -     9 Glyceryl Monothioglycolate - -    133 10 Pyrogallol - -     COLORANTS, DYES, FOOD ADDITIVES   # Substance 2 days 4 days remarks     Textile dyes      134 1 Michel Brown R - -    135 2 Disperse Blue-3 - -     3 Disperse Orange-3 - -     4 Disperse Red 1 - 1% - -     5 Disperse Yellow-9 1% - -     6 Disperse blue mix 106/124 - -    140 7 Disperse yellow 3 - -     8 Naphthol AS - -     9 Disperse Red 11 - -     10 Reactive Black 5 - -     11 Disperse Red 17 - -    145 12 Acid Yellow 61 - -     13 Acid Red 118 - -     14 Disperse Blue 35 - -     15 Basic Red 46 - -      Food dyes/additives       16 Metanil yellow (F/T) - -    150 17 Cochineal Red (F/T) - -     18 Brilliant Black - -     19 Erythrosine-B (F/T) - -     20 Aspartame - -     21 [place] (F/T) - -    155 22 Quinoline Yellow - -     23 Azorubine - -    157 24 Tartrazine - -       Results of patch tests:                         Interpretation:  - Negative                    A    = Allergic      (+) Erythema    TI   = Toxic/irritant   + E + Infiltration    RaP = Relevance at Present     ++ E/I + Papulovesicle   Rpr  = Relevance Previously     +++ E/I/P  + Blister     nR   = No Relevance       [] No relevant allergic reaction observed    [] Allergic reaction diagnosed against following allergens:      Interpretation/ remarks:   See later    [] Patient information given   [] ACDS CAMP information's (# ....) to following compounds: .....   [] General information's to following compounds: ......    ____________________________________________    Assessment & Plan:    ==> Final Diagnosis:     # Recurrent therapy-resistant perioral dermatitis  DDx: Additionally irritant dermatitis with atopy  DDx: Allergic contact dermatitis   * chronic illness with exacerbation, progression, side effects from treatment    # Atopic predisposition with:   Slight morning rhinitis with strong dust mite sensitization  Seasonal rhinitis in spring (birch pollen) and fall (ragweed and mug wort)  Hypersensitive and hyperirritable skin  Possible nickel allergy  Rhinitis around cats --> prick test positive  * stable chronic illness    # Subacute to chronic eczema on back of hands  DDx: Irritant dermatitis with atopy  DDx: Allergic contact dermatitis   * chronic illness with exacerbation, progression, side effects from treatment    # 1x episode of urticaria in 1/2024. Resolved.    These conclusions are made at the best of one's knowledge and belief based on the provided evidence such as patient's history and allergy test results and they can change over time or can be incomplete because of missing information.    ==> Treatment Plan:     >> Will update below treatment plan on Thursday after 2nd readings and final conclusions:   >> Patch tests are appropriate, as dermatitis has improved, but she still has flares. Until patch tests can be completed:  - Encouraged daily moisturization of face and body with a gentle moisturizer, such as Vanicream, Cetaphil, or CeraVe.  - Continue with CeraVe facial wash.  - Continue with Elidel: apply BID on face, hands, and rest of body if skin is red.   - Continue  with Protopic BID on hands.  - Once face is no longer as red, for maintenance:  - Apply MetroCream prescribed by [facility], and then Elidel QPM.  - Allergy RN will send CPT codes for planned allergy tests via [employer] at patient's request.    >> For house dust mite allergy:  - Continue with for house dust mite reduction.  - Can take Claritin or Zyrtec before she goes to bed.     Procedures Performed: Allergy tests, including patch tests    Staff Involved: Provider, Staff, Resident, and Scribe    Scribe Disclosure:   I, Calixto Lamb, am serving as a scribe to document services personally performed by See Hawkins MD based on data collection and the provider's statements to me.     Staff Physician Comments:  I was present with the scribe who participated in the documentation of the note. I have verified the history and personally performed the physical exam and medical decision making. I agree with the assessment and plan as documented in the note. I have reviewed and if necessary amended the note.      See Hawkins MD  Professor  Head of Dermato-Allergy Division  Department of Dermatology  [facility]      Follow-up in Derm-Allergy clinic for 1st readings of patch tests after 2 days   ___________________________    I spent a total of 30 minutes with Bess M Caleb during today s  visit. This time was spent discussing all the individual test results, correlating them to the clinical relevance, counseling the patient and/or coordinating care and performing allergy tests or procedures.      Again, thank you for allowing me to participate in the care of your patient.        Sincerely,        See Hawkins MD    Electronically signed

## 2025-03-31 NOTE — PROGRESS NOTES
[facility] Dermato-[facility]ergology Note  Office visit  Encounter Date: Mar 31, 2025  ____________________________________________    CC: Allergy Testing Followup (Patch testing day 1)     HPI:  (Mar 31, 2025)  Ms. Bess Ellis is a(n) 35 year old female who presents today as a return patient for allergy tests as planned  - Follow-up in Derm-Allergy clinic for patch tests as planned for week of 3/31/25  - She states her face is doing much better, but she still has some itching   - She continues to use Elidel for the face, not everyday  - She is also using Protopic for her hands  - Otherwise feeling well in usual state of health    Physical Exam:  General: In no acute distress, well-developed, well-nourished  Eyes: no conjunctivitis  ENT: no signs of rhinitis   Pulmonary: no wheezing or coughing  Skin: Focused examination of the skin on test sites was performed = see test results below  No active eczematous skin lesions on tests sites, particularly back  Focused examination of the face and hands was performed.  - Dry red skin on the knuckles  - Slight redness around the nose    Earlier History and Allergy Exams:  (Mar 12, 2025)  Presents today for follow-up  for allergy consultation  - Follow-up in Derm-Allergy clinic in spring 2025 for allergy tests as planned, with check-in (can be virtual or in person) 2 weeks before to determine if patch tests are still necessary  - Reports she is doing better  - Had been putting on topicals (including Elidel) consistently every day until start of 2025, and then has gradually lessened use to just PRN  - Still having some itchiness and irritation on hands, face (around eyes and mouth), just not as severe  - Using Elidel on face  - Was using Elidel on hands, but switched to Protopic     Skin: Focused examination of the face and hands was performed.  - Little bit of erythema with some papules on the base of the nose.  - On the back of the hands over the knuckles  "mostly, there is xerosis.     (Sep 25, 2024)  New patient for allergy consultation  - Self-referred primarily for spreading hives, but also for perioral dermatitis and eczema  - No prior allergy records in [facility]  - However, multiple claims-derived visit encounters (without actual visit notes) in Epic from 1/17/19 to 1/25/24 with Dr. Ayesha Hernandez ([facility] or various dermatology concerns, with rash and dermatitis as dx starting 6/10/19  - Additional claims-derived visit on 3/28/24 with Dr. Zion Zabala ([facility]) for idiopathic urticaria, dermatitis, pruritus, and dyshidrosis  - Then 5/6/24 and 6/24/24 claims-derived visits with Dr. Lacie Hinds ([facility]) for idiopathic urticaria, perioral dermatitis, pruritus, and dyshidrosis  - Starting 1/2024  - Face, eyes, mouth, neck, and chest, abdomen, and later on occasionally on legs  - Would have tiny little bumps that became really itchy and red  - Would last for a couple days  - Denies skin being dry or scaly  - Also has hand eczema  - Also has perioral dermatitis  - Has tried Protopic (face), steroids, antihistamines, doxycycline, and metronidazole  - 1st tried topical steroids on face  - Then doxycycline  - When stopped doxycycline, couldn't sleep at night with itchy face  - At that time told to stop topical steroids on face, and given 2nd course of doxycycline: BID x 1 month, and the QD x 1 month  - Started 3rd month of doxycyline, has started to taper off, and sometimes feels a little itchy  - Uses CeraVe face wash; does not use BPO (tried years ago and had a reaction)  - Last dyed her hair ~1 year ago  - Has chronically dry eyes  - Skin care routine is \"basic\": includes CeraVe as above, tretinoin, vitamin C and eye creams    Focused examination of the face and hands was performed.  - Today, on her hands, there is a slightly erythematous xerotic 5 cm x 3 cm plaque on right dorsal palm, and slightly less on left hand. Back of hands are xerotic. "   - 1/2/2024 photos on patient's phone: Papules around the mouth on the chin area.  - 5/2/24 photo on patient's phone: Clear perioral dermatitis with typical pustules around both corners of the mouth. She reports she heard about treatment for atopic dermatitis with [place]'s soap and azelaic acid, which she was using at this time.    Past Medical History:   Patient Active Problem List   Diagnosis    IUD contraception    Other irritable bowel syndrome    ASCUS with positive high risk HPV cervical    Acne vulgaris    Dysmenorrhea    Food protein induced enterocolitis syndrome    Constipation, unspecified constipation type    Dyspareunia in female    Mild major depression     Past Medical History:   Diagnosis Date    Abnormal Pap smear of cervix 02/28/2018 02/28/18: see problem list.    Cervical high risk HPV (human papillomavirus) test positive 03/05/2019 03/05/19: See problem list.     Depressive disorder 2021     Allergies:  No Known Allergies    Medications:  Current Outpatient Medications   Medication Sig Dispense Refill    Cholecalciferol (VITAMIN D-3 PO) Take 125 mcg by mouth daily      Cyanocobalamin (B-12 PO) Take 1 tablet by mouth daily      hydrocortisone 2.5 % cream Apply 1 Application topically as needed      levonorgestrel (MIRENA) 20 MCG/DAY IUD 1 each by Intrauterine route once (Liletta IUD - placed at [facility] in 11/21)      Magnesium 300 MG CAPS Take 1 tablet by mouth daily      metroNIDAZOLE (METROCREAM) 0.75 % external cream       Omega-3 Fatty Acids (FISH OIL) 1200 MG capsule Take 1,200 mg by mouth daily      pimecrolimus (ELIDEL) 1 % external cream Apply topically 2 times daily. On face (is less greasy than Protopic) and therefore better for dermatitis in face 30 g 2    Probiotic Product (PROBIOTIC DAILY PO)       spironolactone (ALDACTONE) 50 MG tablet Take 50 mg by mouth daily Through dermatology      tacrolimus (PROTOPIC) 0.1 % external ointment       tretinoin  (RETIN-A) 0.01 % external gel Apply topically At Bedtime       No current facility-administered medications for this visit.     Family History:  Family History   Problem Relation Age of Onset    Cancer Mother 57        CLL, donig well, no txt    Gastrointestinal Disease Mother         IBS    Other Cancer Mother         CLL    Gastrointestinal Disease Sister         IBS    Depression Sister     Anxiety Disorder Sister     Cancer Maternal Grandmother 65        CML, had txt,  at 90, unsure cause of death    Coronary Artery Disease Maternal Grandfather         later in life    Colon Cancer Paternal Grandmother         60s    Coronary Artery Disease Paternal Grandfather         later in life    Colon Cancer Paternal Grandfather         60s    Hypertension Father     Cerebrovascular Disease Father     Anxiety Disorder Father     Glaucoma No family hx of     Macular Degeneration No family hx of    Positive for allergies. Negative for eczema and asthma.    Previous Labs, Allergy Tests, Dermatopathology, Imaging:  See HPI - no records in Epic as of 24.    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 Does not wear gloves at work.  Patient has the following hobbies or non-occupational exposure: weightlifting (will apply , she is unsure of material) and dancing.    Order for Future Allergy Testing:    [x] Outpatient  [] Inpatient: [facility]..../ Bed ...    Skin Atopy (atopic dermatitis)? [x] Yes     [] No  Comments: diagnosed by outside dermatology - see HPI  Childhood eczema?   [] Yes     [x] No  Comments:   Hand eczema?   [x] Yes     [] No  If yes, leading hand? [] Right     [] Left     [] Ambidextrous  Comments: see HPI    Contact allergies?   [x] Yes     [] No  Comments:   Including adhesives/bandages? [] Yes     [x] No  Comments:   Including metals?   [x] Yes     [] No  Comments: earrings: sometimes would have itchy ears with  slight redness and swelling when wearing cheap jewelry    Drug allergies?   [] Yes     [x] No  Comments:     Angioedema?   [] Yes     [] No  Comments:     Urticaria?   [x] Yes     [] No  Comments: see HPI - reason for visit    Food allergies?   [] Yes     [x] No  Comments: eats cherries daily without mouth/throat itching    Pet allergies?   [] Yes     [x] No  Comments: just got a lab retriever puppy 2 days ago; does not have symptoms around; sneezing if she touches cats    Environmental allergy symptoms?  [] Conjunctivitis  [] Otitis  [] Pharyngitis  [] Polyposis  [] Postnasal Drip  [x] Rhinitis - slightly stuffy  [] Sinusitis  [] None  Comments:     HENT Operations?  [] Adenoids [] Septum [] Sinus  [] Tonsils        [] Other:   [] None  Comments:     Pulmonary symptoms (from birth to present)?  [] Asthma bronchiale  Inhaler(s)?:   [] Coughing  [] Other  [x] None  Comments:     Environmental and pulmonary symptoms aggravated by?  Season: [] None     [] I     [] II     [x] III     [x] IV     [x] V     [] VI          [] VII     [x] VIII     [x] IX     [x] X     [] XI     [] XII     [] Perennial  Time of Day: [] None     [x] Morning (once moving to new house recently)     [] Noon     [] Evening     [] Night     [] Whole Day  Location/Changes: [] None     [] Inside     [] Outside     [] Mountain     [] Sea     [] Other:   Triggers (specific): [] None     [] Animals     [] Dust     [] Mold     [] Plants     [] Other:   Triggers (other): [] None     [] Psyche     [] Sport     [] Work     [] Other:   Irritant: [] None     [] Cold     [] Heat     [] Odors     [] Physical Efforts     [] Smoke     [] Other:     Order for PATCH TESTS  Reason for tests (suspected allergy): recurrent therapy-resistant perioral dermatitis  Known previous allergies: none  Standardized panels  [x] Standard panel (40 tests)  [x] Preservatives & Antimicrobials (31 tests)  [x] Emulsifiers & Additives (25 tests)   [x] Perfumes/Flavours & Plants (25  tests)  [x] Hairdresser panel (12 tests)  [] Rubber Chemicals (22 tests)  [] Plastics (26 tests)  [x] Colorants/Dyes/Food additives (20 tests)  [] Metals (implants/dental) (24 tests)  [] Local anaesthetics/NSAIDs (13 tests)  [] Antibiotics & Antimycotics (14 tests)   [] Corticosteroids (15 tests)   [] Photopatch test (62 tests)   [] Others:     [] Patient's Own Products:   DO NOT test if chemical or biological identity is unknown!   always ask from patient the product information and safety sheets (MSDS)       Order for PRICK TESTS    Reason for tests (suspected allergy): atopy?  Known previous allergies: none    Standardized prick panels  [x] Atopic panel (20 tests)  [] Pediatric Panel (12 tests)  [] Milk, Meat, Eggs, Grains (20 tests)   [] Dust, Epithelia, Feathers (10 tests)  [] Fish, Seafood, Shellfish (17 tests)  [] Nuts, Beans (14 tests)  [] Spice, Vegetable, Fruit (17 tests)  [] Pollen Panel = Tree, Grass, Weed (24 tests)  [] Others:     [] Patient's Own Products:   DO NOT test if chemical or biological identity is unknown!   always ask from patient the product information and safety sheets (MSDS)     Standardized intradermal tests  [] Alternaria alternata  [] Aspergillus fumigatus  [] Cladosporium herbarum   [] Penicillium notatum  [] Dermatophagoides farinae  [] Dermatophagoides pteronyssinus  [] Dog Epithelium  [] Cat Epithelium  [] Others:     [] Bee venom   [] Wasp venom  !!Specific protocol with dilutions!!       Order for Drug allergy tests (prick & intradermal & patch tests)    [] Penicillin G     [] Ampicillin   [] Cefazolin        [] Ceftriaxone     [] Ceftazidime     [] Cefepime     [] Cefuroxime  [] Bactrim  [] Iodixanol     [] Iopamidol        [] Iohexol  [] Others:   [] Patient's Own:   Order for ... as test date      ____________________________________________      Atopy Screen (Placed Sep 25, 2024)  No Substance Readings (15 min) Evaluation   POS Histamine 1mg/ml +/++    NEG NaCl 0.9% -       No Substance Readings (15 min) Evaluation   1 Alternaria alternata (tenuis)  +    2 Cladosporium herbarum +    3 Aspergillus fumigatus -    4 Penicillium notatum -    5 Dermatophagoides pteronyssinus ++    6 Dermatophagoides farinae +++    7 Dog epithelium (canis spp) +    8 Cat hair (tiffanie catus) ++    9 Cockroach   (Blatella americana & germanica) -    10 Grass mix midwest   (Estefani, Orchard, Redtop, Agus) -    11 Jonh grass (sorghum halepense) -    12 Weed mix   (common Cocklebur, Lamb s quarters, rough redroot Pigweed, Dock/Sorrel) -    13 Mug wort (artemisia vulgare) +    14 Ragweed giant/short (ambrosia spp) +++    15 White birch (Betula papyrifera) +++    16 Tree mix 1 (Pecan, Maple BHR, Oak RVW, american [place], black [place]) +    17 Red cedar (juniperus virginia) -    18 Tree mix 2   (white Nabil, river/red Birch, black [place], common Foley, american Elm) +/++    19 Box elder/Maple mix (acer spp) -    20 [place] shagbark (carya ovata) -    Conclusion:      ____________________________________________      RESULTS & EVALUATION of PATCH TESTS    Mar 31, 2025 application of patch tests:    Patch test readings after     [x] 2 days, [] 3 days [x] 4 days, [] 5 days,  Other duration: ...      STANDARD Series                                          # Substance 2 days 4 days remarks     1 Igor Mix III 10% - -       2 Colophony - -       3  2-Mercaptobenzothiazole  - -       4 Methylisothiazolinone - -       5 Carba Mix - -       6 Thiuram Mix [A] - -       7 Bisphenol A Epoxy Resin - -       8 G-Azvs-Faffweklrid-Formaldehyde Resin - -       9 Mercapto Mix [A] - -       10 Black Rubber Mix- PPD [B] - -       11 Potassium Dichromate  -  -       12 Balsam of [place] (Myroxylon Pereirae Resin) - -       13 Nickel Sulphate Hexahydrate - -       14 Mixed Dialkyl Thiourea - -       15 Paraben Mix [B] - -       16 Methyldibromo Glutaronitrile - -       17 Fragrance Mix 8% - -       18  2-Bromo-2-Nitropropane-1,3-Diol (Bronopol) CT - -       19 Lyral - -       20 Tixocortol-21- Pivalate CT - -       21 Diazolidinyl urea (Germall II) - -        22 Methyl Methacrylate - -       23 Cobalt (II) Chloride Hexahydrate - -       24 Fragrance Mix II  - -       25 Compositae Mix II - -       26 Benzoyl Peroxide - -       27 Bacitracin - -       28 Formaldehyde - -       29 Methylchloroisothiazolinone / Methylisothiazolinone - -       30 Corticosteroid Mix CT - -       31 Sodium Lauryl Sulfate - -       32 Lanolin Alcohol - -       33 Turpentine - -       34 Cetylstearylalcohol - -       35 Chlorhexidine Dicluconate - -       36 Budesonide - -       37 Imidazolidinyl Urea  - -       38 Ethyl-2 Cyanoacrylate - -       39 Quaternium 15 (Dowicil 200) - -       40 Decyl Glucoside - -     Compositae Mix II - common yarrow, mountain arnica, Haitian chamomile, feverfew, and the common tansy   PRESERVATIVES & ANTIMICROBIALS        # Substance 2 days 4 days remarks   41 1  1,2-Benzisothiazoline-3-One, Sodium Salt - -     2  1,3,5-Kushal (2-Hydroxyethyl) - Hexahydrotriazine (Grotan BK) - -     3 5-Upuuvrbpbzobw-5-Nitro-1, 3-Propanediol NA NA     4  3, 4, 4' - Triclocarban - -    45 5 4 - Chloro - 3 - Cresol - -     6 4 - Chloro - 4 - Xylenol (PCMX) - -     7 7-Ethylbicyclooxazolidine (Bioban [id number]) - -     8 Benzalkonium Chloride CT - -     9 Benzyl Alcohol - -    50 10 Cetalkonium Chloride - -     11 Cetylpyrimidine Chloride  - -     12 Chloroacetamide - -     13 DMDM Hydantoin - -     14 Glutaraldehyde - -    55 15 Triclosan - -     16 Glyoxal Trimeric Dihydrate - -     17 Iodopropynyl Butylcarbamate - -     18 Octylisothiazoline - -     19 Acetophenone Azine - -    60 20 Bioban P 1487 (Nitrobutyl) Morpholine/(Ethylnitro-Trimethylene) Dimorpholine - -     21 Phenoxyethanol - -     22 Phenyl Salicylate - -     23 Povidone Iodine - -     24 Sodium Benzoate - -    65 25 Sodium Disulfite - -     26 Sorbic Acid - -     27  Thimerosal - -     28 Melamine Formaldehyde Resin - -     29 Ethylenediamine Dihydrochloride - -      Parabens      70 30 Butyl-P-Hydroxybenzoate - -     31 Ethyl-P-Hydroxybenzoate - -     32 Methyl-P-Hydroxybenzoate - -    73 33 Propyl-P-Hydroxybenzoate - -     EMULSIFIERS & ADDITIVES       # Substance 2 days 4 days remarks   74 1 Polyethylene Glycol-400 - -    75 2 Cocamidopropyl Betaine - -     3 Amerchol L101 - -     4 Propylene Glycol - -     5 Triethanolamine - -     6 Sorbitane Sesquiolate CT - -    80 7 Isopropylmyristate - -     8 Polysorbate 80 CT - -     9 Amidoamine   (Stearamidopropyl Dimethylamine) - -     10 Oleamidopropyl Dimethylamine - -     11 Lauryl Glucoside - -    85 12 Coconut Diethanolamide  - -     13 2-Hydroxy-4-Methoxy Benzophenone (Oxybenzone) - -     14 Benzophenone-4 (Sulisobenzon) - -     15 Propolis - -     16 Dexpanthenol - -    90 17 Abitol (Hydroabietyl Alcohol) - -     18 Tert-Butylhydroquinone - -     19 Benzyl Salicylate - -     20 Dimethylaminopropylamin (DMPA) - -     21 Zinc Pyrithione (Zinc Omadine)  - -    95 22 Kushal(Hydroxymethyl) Nitromethane  - -      Antioxidant       23 Dodecyl Gallate - -     24 Butylhydroxyanisole (BHA) - -     25 Butylhydroxytoluene (BHT) - -     26 Di-Alpha-Tocopherol (Vit E) - -    100 27 Propyl Gallate - -     PERFUMES, FLAVORS & PLANTS        # Substance 2 days 4 days remarks   101 1 Benzyl Cinnamate - -     2 Di-Limonene (Dipentene) - -     3 [place]anga Odorata (Ylang Ylang) (I) - -     4 Lichen Acid Mix - -    105 5 Mentha Piperita Oil (Peppermint Oil) - -     6 Sesquiterpenelactone mix - -     7 Tea Tree Oil, Oxidized - -     8 Wood Tar Mix - -     9 Abietic Acid - -    110 10 Lavendula Angustifolia Oil (Lavender Oil) - -     11 Fragrance mix II CT * 14% - -      Fragrance Mix I       12 [place] Absolute - -     13 Eugenol - -     14 Geraniol - -    115 15 Hydroxycitronellal - -     16 Isoeugenol - -     17 Cinnamic Aldehyde - -     18 Cinnamic  Alcohol  - -      Fragrance mix II       19 Citronellol - -    120 20 Alpha-Hexylcinnamic Aldehyde    - -     21 Citral - -     22 Farnesol - -    123 23 Coumarin - -    Hexylcinnamic aldehyde, Coumarin, Farnesol, Hydroxyisohexy3-cyclohexene carboxaldehyde, citral, citrolellol   HAIRDRESSER        # Substance 2 days 4 days remarks   124 1 P-Phenylenediamine -  -    125 2 P-Aminodiphenylamine - -     3 P-Toluenediamine Sulphate  -  -     4 Ammonium Thioglycolate - -     5 Ammonium Persulfate - -     6 Resorcinol - -    130 7 3-Aminophenol - -     8 P-Aminophenol - -     9 Glyceryl Monothioglycolate - -    133 10 Pyrogallol - -     COLORANTS, DYES, FOOD ADDITIVES   # Substance 2 days 4 days remarks     Textile dyes      134 1 Michel Brown R - -    135 2 Disperse Blue-3 - -     3 Disperse Orange-3 - -     4 Disperse Red 1 - 1% - -     5 Disperse Yellow-9 1% - -     6 Disperse blue mix 106/124 - -    140 7 Disperse yellow 3 - -     8 Naphthol AS - -     9 Disperse Red 11 - -     10 Reactive Black 5 - -     11 Disperse Red 17 - -    145 12 Acid Yellow 61 - -     13 Acid Red 118 - -     14 Disperse Blue 35 - -     15 Basic Red 46 - -      Food dyes/additives       16 Metanil yellow (F/T) - -    150 17 Cochineal Red (F/T) - -     18 Brilliant Black - -     19 Erythrosine-B (F/T) - -     20 Aspartame - -     21 [place] (F/T) - -    155 22 Quinoline Yellow - -     23 Azorubine - -    157 24 Tartrazine - -       Results of patch tests:                         Interpretation:  - Negative                    A    = Allergic      (+) Erythema    TI   = Toxic/irritant   + E + Infiltration    RaP = Relevance at Present     ++ E/I + Papulovesicle   Rpr  = Relevance Previously     +++ E/I/P + Blister     nR   = No Relevance       [] No relevant allergic reaction observed    [] Allergic reaction diagnosed against following allergens:      Interpretation/ remarks:   See later    [] Patient information given   [] ACDS CAMP  information's (# ....) to following compounds: .....   [] General information's to following compounds: ......    ____________________________________________    Assessment & Plan:    ==> Final Diagnosis:     # Recurrent therapy-resistant perioral dermatitis  DDx: Additionally irritant dermatitis with atopy  DDx: Allergic contact dermatitis   * chronic illness with exacerbation, progression, side effects from treatment    # Atopic predisposition with:   Slight morning rhinitis with strong dust mite sensitization  Seasonal rhinitis in spring (birch pollen) and fall (ragweed and mug wort)  Hypersensitive and hyperirritable skin  Possible nickel allergy  Rhinitis around cats --> prick test positive  * stable chronic illness    # Subacute to chronic eczema on back of hands  DDx: Irritant dermatitis with atopy  DDx: Allergic contact dermatitis   * chronic illness with exacerbation, progression, side effects from treatment    # 1x episode of urticaria in 1/2024. Resolved.    These conclusions are made at the best of one's knowledge and belief based on the provided evidence such as patient's history and allergy test results and they can change over time or can be incomplete because of missing information.    ==> Treatment Plan:     >> Will update below treatment plan on Thursday after 2nd readings and final conclusions:   >> Patch tests are appropriate, as dermatitis has improved, but she still has flares. Until patch tests can be completed:  - Encouraged daily moisturization of face and body with a gentle moisturizer, such as Vanicream, Cetaphil, or CeraVe.  - Continue with CeraVe facial wash.  - Continue with Elidel: apply BID on face, hands, and rest of body if skin is red.   - Continue with Protopic BID on hands.  - Once face is no longer as red, for maintenance:  - Apply MetroCream prescribed by [facility], and then Elidel QPM.  - Allergy RN will send CPT codes for planned allergy tests via [email or web address] at  patient's request.    >> For house dust mite allergy:  - Continue with for house dust mite reduction.  - Can take Claritin or Zyrtec before she goes to bed.     Procedures Performed: Allergy tests, including patch tests    Staff Involved: Provider, Staff, Resident, and Scribe    Scribe Disclosure:   I, Calixto Lamb, am serving as a scribe to document services personally performed by See Hawkins MD based on data collection and the provider's statements to me.     Staff Physician Comments:  I was present with the scribe who participated in the documentation of the note. I have verified the history and personally performed the physical exam and medical decision making. I agree with the assessment and plan as documented in the note. I have reviewed and if necessary amended the note.      See Hawkins MD  Professor  Head of Dermato-Allergy Division  Department of Dermatology  [facility]      Follow-up in Derm-Allergy clinic for 1st readings of patch tests after 2 days   ___________________________    I spent a total of 30 minutes with Bess MARIYA Caleb during today s  visit. This time was spent discussing all the individual test results, correlating them to the clinical relevance, counseling the patient and/or coordinating care and performing allergy tests or procedures.

## 2025-04-02 ENCOUNTER — OFFICE VISIT (OUTPATIENT)
Dept: ALLERGY | Facility: CLINIC | Age: 35
End: 2025-04-02
Payer: COMMERCIAL

## 2025-04-02 DIAGNOSIS — L71.0 PERIORAL DERMATITIS: ICD-10-CM

## 2025-04-02 DIAGNOSIS — L20.89 OTHER ATOPIC DERMATITIS: Primary | ICD-10-CM

## 2025-04-02 DIAGNOSIS — L23.5 ALLERGIC DERMATITIS DUE TO OTHER CHEMICAL PRODUCT: ICD-10-CM

## 2025-04-02 DIAGNOSIS — Z91.09 HOUSE DUST MITE ALLERGY: ICD-10-CM

## 2025-04-02 PROCEDURE — 99207 PR BUNDLED PROCEDURE IN GLOBAL PKG: CPT | Performed by: DERMATOLOGY

## 2025-04-02 NOTE — PATIENT INSTRUCTIONS
You can always access your most recent office visit note with this allergy clinic via [facility]'s MyChart, which contains all of your results from testing performed by Dr. Hawkins along with the details of the discussed treatment plan.  If you do not have access to [email or web address], please discuss with a [place] staff member. Additionally, if your provider is within [place] or their EMR participates in the [facility] network, they can also access this information.     ____________________________________________

## 2025-04-02 NOTE — LETTER
4/2/2025      Bess Ellis  [address]  [facility] [address]      Dear Colleague,    Thank you for referring your patient, Bess Ellis, to the [facility] [place]. Please see a copy of my visit note below.    [facility] Dermato[facility]ergology Note  Office visit  Encounter Date: Apr 2, 2025  ____________________________________________    CC: Allergy Testing Followup (Patch day 3)      HPI:  (Apr 2, 2025)  Ms. Bess Ellis is a(n) 35 year old female who presents today as a return patient for allergy tests as planned  - Follow-up in Derm-Allergy clinic for 1st readings of patch tests after 2 days   - Otherwise feeling well in usual state of health    Physical Exam:  General: In no acute distress, well-developed, well-nourished  Eyes: no conjunctivitis  ENT: no signs of rhinitis   Pulmonary: no wheezing or coughing  Skin: Focused examination of the skin on test sites was performed = see test results below  - No active eczematous skin lesions on tests sites, particularly back    Earlier History and Allergy Exams:  (Mar 31, 2025)  - Follow-up in Derm-Allergy clinic for patch tests as planned for week of 3/31/25  - She states her face is doing much better, but she still has some itching   - She continues to use Elidel for the face, not everyday  - She is also using Protopic for her hands    Focused examination of the face and hands was performed.  - Dry red skin on the knuckles  - Slight redness around the nose    (Mar 12, 2025)  - Follow-up in Derm-Allergy clinic in spring 2025 for allergy tests as planned, with check-in (can be virtual or in person) 2 weeks before to determine if patch tests are still necessary  - Reports she is doing better  - Had been putting on topicals (including Elidel) consistently every day until start of 2025, and then has gradually lessened use to just PRN  - Still having some itchiness and irritation on hands, face (around eyes and mouth),  "just not as severe  - Using Elidel on face  - Was using Elidel on hands, but switched to Protopic     Skin: Focused examination of the face and hands was performed.  - Little bit of erythema with some papules on the base of the nose.  - On the back of the hands over the knuckles mostly, there is xerosis.     (Sep 25, 2024)  New patient for allergy consultation  - Self-referred primarily for spreading hives, but also for perioral dermatitis and eczema  - No prior allergy records in [facility]  - However, multiple claims-derived visit encounters (without actual visit notes) in Epic from 1/17/19 to 1/25/24 with Dr. Ayesha Hernandez ([facility] or various dermatology concerns, with rash and dermatitis as dx starting 6/10/19  - Additional claims-derived visit on 3/28/24 with Dr. Zion Zabala ([facility]) for idiopathic urticaria, dermatitis, pruritus, and dyshidrosis  - Then 5/6/24 and 6/24/24 claims-derived visits with Dr. Lacie Hinds ([facility]) for idiopathic urticaria, perioral dermatitis, pruritus, and dyshidrosis  - Starting 1/2024  - Face, eyes, mouth, neck, and chest, abdomen, and later on occasionally on legs  - Would have tiny little bumps that became really itchy and red  - Would last for a couple days  - Denies skin being dry or scaly  - Also has hand eczema  - Also has perioral dermatitis  - Has tried Protopic (face), steroids, antihistamines, doxycycline, and metronidazole  - 1st tried topical steroids on face  - Then doxycycline  - When stopped doxycycline, couldn't sleep at night with itchy face  - At that time told to stop topical steroids on face, and given 2nd course of doxycycline: BID x 1 month, and the QD x 1 month  - Started 3rd month of doxycyline, has started to taper off, and sometimes feels a little itchy  - Uses CeraVe face wash; does not use BPO (tried years ago and had a reaction)  - Last dyed her hair ~1 year ago  - Has chronically dry eyes  - Skin care routine is \"basic\": " includes CeraVe as above, tretinoin, vitamin C and eye creams    Focused examination of the face and hands was performed.  - Today, on her hands, there is a slightly erythematous xerotic 5 cm x 3 cm plaque on right dorsal palm, and slightly less on left hand. Back of hands are xerotic.   - 1/2/2024 photos on patient's phone: Papules around the mouth on the chin area.  - 5/2/24 photo on patient's phone: Clear perioral dermatitis with typical pustules around both corners of the mouth. She reports she heard about treatment for atopic dermatitis with Thurston's soap and azelaic acid, which she was using at this time.    Past Medical History:   Patient Active Problem List   Diagnosis     IUD contraception     Other irritable bowel syndrome     ASCUS with positive high risk HPV cervical     Acne vulgaris     Dysmenorrhea     Food protein induced enterocolitis syndrome     Constipation, unspecified constipation type     Dyspareunia in female     Mild major depression     Past Medical History:   Diagnosis Date     Abnormal Pap smear of cervix 02/28/2018 02/28/18: see problem list.     Cervical high risk HPV (human papillomavirus) test positive 03/05/2019 03/05/19: See problem list.      Depressive disorder 2021     Allergies:  No Known Allergies    Medications:  Current Outpatient Medications   Medication Sig Dispense Refill     Cholecalciferol (VITAMIN D-3 PO) Take 125 mcg by mouth daily       Cyanocobalamin (B-12 PO) Take 1 tablet by mouth daily       hydrocortisone 2.5 % cream Apply 1 Application topically as needed       levonorgestrel (MIRENA) 20 MCG/DAY IUD 1 each by Intrauterine route once (Liletta IUD - placed at [facility] in 11/21)       Magnesium 300 MG CAPS Take 1 tablet by mouth daily       metroNIDAZOLE (METROCREAM) 0.75 % external cream        Omega-3 Fatty Acids (FISH OIL) 1200 MG capsule Take 1,200 mg by mouth daily       pimecrolimus (ELIDEL) 1 % external cream Apply topically 2  times daily. On face (is less greasy than Protopic) and therefore better for dermatitis in face 30 g 2     Probiotic Product (PROBIOTIC DAILY PO)        spironolactone (ALDACTONE) 50 MG tablet Take 50 mg by mouth daily Through dermatology       tacrolimus (PROTOPIC) 0.1 % external ointment        tretinoin (RETIN-A) 0.01 % external gel Apply topically At Bedtime       No current facility-administered medications for this visit.     Family History:  Family History   Problem Relation Age of Onset     Cancer Mother 57        CLL, donig well, no txt     Gastrointestinal Disease Mother         IBS     Other Cancer Mother         CLL     Gastrointestinal Disease Sister         IBS     Depression Sister      Anxiety Disorder Sister      Cancer Maternal Grandmother 65        CML, had txt,  at 90, unsure cause of death     Coronary Artery Disease Maternal Grandfather         later in life     Colon Cancer Paternal Grandmother         60s     Coronary Artery Disease Paternal Grandfather         later in life     Colon Cancer Paternal Grandfather         60s     Hypertension Father      Cerebrovascular Disease Father      Anxiety Disorder Father      Glaucoma No family hx of      Macular Degeneration No family hx of    Positive for allergies. Negative for eczema and asthma.    Previous Labs, Allergy Tests, Dermatopathology, Imaging:  See HPI - no records in Epic as of 24.    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 Does not wear gloves at work.  Patient has the following hobbies or non-occupational exposure: weightlifting (will apply , she is unsure of material) and dancing.    Order for Future Allergy Testing:    [x] Outpatient  [] Inpatient: [facility]..../ Bed ...    Skin Atopy (atopic dermatitis)? [x] Yes     [] No  Comments: diagnosed by outside dermatology - see HPI  Childhood eczema?   [] Yes     [x] No  Comments:   Hand  eczema?   [x] Yes     [] No  If yes, leading hand? [] Right     [] Left     [] Ambidextrous  Comments: see HPI    Contact allergies?   [x] Yes     [] No  Comments:   Including adhesives/bandages? [] Yes     [x] No  Comments:   Including metals?   [x] Yes     [] No  Comments: earrings: sometimes would have itchy ears with slight redness and swelling when wearing cheap jewelry    Drug allergies?   [] Yes     [x] No  Comments:     Angioedema?   [] Yes     [] No  Comments:     Urticaria?   [x] Yes     [] No  Comments: see HPI - reason for visit    Food allergies?   [] Yes     [x] No  Comments: eats cherries daily without mouth/throat itching    Pet allergies?   [] Yes     [x] No  Comments: just got a lab retriever puppy 2 days ago; does not have symptoms around; sneezing if she touches cats    Environmental allergy symptoms?  [] Conjunctivitis  [] Otitis  [] Pharyngitis  [] Polyposis  [] Postnasal Drip  [x] Rhinitis - slightly stuffy  [] Sinusitis  [] None  Comments:     HENT Operations?  [] Adenoids [] Septum [] Sinus  [] Tonsils        [] Other:   [] None  Comments:     Pulmonary symptoms (from birth to present)?  [] Asthma bronchiale  Inhaler(s)?:   [] Coughing  [] Other  [x] None  Comments:     Environmental and pulmonary symptoms aggravated by?  Season: [] None     [] I     [] II     [x] III     [x] IV     [x] V     [] VI          [] VII     [x] VIII     [x] IX     [x] X     [] XI     [] XII     [] Perennial  Time of Day: [] None     [x] Morning (once moving to new house recently)     [] Noon     [] Evening     [] Night     [] Whole Day  Location/Changes: [] None     [] Inside     [] Outside     [] Mountain     [] Sea     [] Other:   Triggers (specific): [] None     [] Animals     [] Dust     [] Mold     [] Plants     [] Other:   Triggers (other): [] None     [] Psyche     [] Sport     [] Work     [] Other:   Irritant: [] None     [] Cold     [] Heat     [] Odors     [] Physical Efforts     [] Smoke     [] Other:      Order for PATCH TESTS  Reason for tests (suspected allergy): recurrent therapy-resistant perioral dermatitis  Known previous allergies: none  Standardized panels  [x] Standard panel (40 tests)  [x] Preservatives & Antimicrobials (31 tests)  [x] Emulsifiers & Additives (25 tests)   [x] Perfumes/Flavours & Plants (25 tests)  [x] Hairdresser panel (12 tests)  [] Rubber Chemicals (22 tests)  [] Plastics (26 tests)  [x] Colorants/Dyes/Food additives (20 tests)  [] Metals (implants/dental) (24 tests)  [] Local anaesthetics/NSAIDs (13 tests)  [] Antibiotics & Antimycotics (14 tests)   [] Corticosteroids (15 tests)   [] Photopatch test (62 tests)   [] Others:     [] Patient's Own Products:   DO NOT test if chemical or biological identity is unknown!   always ask from patient the product information and safety sheets (MSDS)       Order for PRICK TESTS    Reason for tests (suspected allergy): atopy?  Known previous allergies: none    Standardized prick panels  [x] Atopic panel (20 tests)  [] Pediatric Panel (12 tests)  [] Milk, Meat, Eggs, Grains (20 tests)   [] Dust, Epithelia, Feathers (10 tests)  [] Fish, Seafood, Shellfish (17 tests)  [] Nuts, Beans (14 tests)  [] Spice, Vegetable, Fruit (17 tests)  [] Pollen Panel = Tree, Grass, Weed (24 tests)  [] Others:     [] Patient's Own Products:   DO NOT test if chemical or biological identity is unknown!   always ask from patient the product information and safety sheets (MSDS)     Standardized intradermal tests  [] Alternaria alternata  [] Aspergillus fumigatus  [] Cladosporium herbarum   [] Penicillium notatum  [] Dermatophagoides farinae  [] Dermatophagoides pteronyssinus  [] Dog Epithelium  [] Cat Epithelium  [] Others:     [] Bee venom   [] Wasp venom  !!Specific protocol with dilutions!!       Order for Drug allergy tests (prick & intradermal & patch tests)    [] Penicillin G     [] Ampicillin   [] Cefazolin        [] Ceftriaxone     [] Ceftazidime     [] Cefepime      [] Cefuroxime  [] Bactrim  [] Iodixanol     [] Iopamidol        [] Iohexol  [] Others:   [] Patient's Own:   Order for ... as test date      ____________________________________________      Atopy Screen (Placed Sep 25, 2024)  No Substance Readings (15 min) Evaluation   POS Histamine 1mg/ml +/++    NEG NaCl 0.9% -      No Substance Readings (15 min) Evaluation   1 Alternaria alternata (tenuis)  +    2 Cladosporium herbarum +    3 Aspergillus fumigatus -    4 Penicillium notatum -    5 Dermatophagoides pteronyssinus ++    6 Dermatophagoides farinae +++    7 Dog epithelium (canis spp) +    8 Cat hair (tiffanie catus) ++    9 Cockroach   (Blatella americana & germanica) -    10 Grass mix midwest   (Estefani, Orchard, Redtop, Agus) -    11 Jonh grass (sorghum halepense) -    12 Weed mix   (common Cocklebur, Lamb s quarters, rough redroot Pigweed, Dock/Sorrel) -    13 Mug wort (artemisia vulgare) +    14 Ragweed giant/short (ambrosia spp) +++    15 White birch (Betula papyrifera) +++    16 Tree mix 1 (Pecan, Maple BHR, Oak RVW, american [place], black [place]) +    17 Red cedar (juniperus virginia) -    18 Tree mix 2   (white Nabil, river/red Birch, black [place], common Evans, american Elm) +/++    19 Box elder/Maple mix (acer spp) -    20 Maui shagbark (carya ovata) -    Conclusion:      ____________________________________________      RESULTS & EVALUATION of PATCH TESTS    Mar 31, 2025 application of patch tests:    Patch test readings after     [x] 2 days, [] 3 days [x] 4 days, [] 5 days,  Other duration: ...      STANDARD Series                                          # Substance 2 days 4 days remarks     1 Igor Mix III 10% - -       2 Colophony - -       3  2-Mercaptobenzothiazole  - -       4 Methylisothiazolinone - -       5 Carba Mix - -       6 Thiuram Mix [A] - -       7 Bisphenol A Epoxy Resin - -       8 X-Cxtm-Wddnmwdvovn-Formaldehyde Resin - -       9 Mercapto Mix [A] - -       10 Black Rubber Mix-  PPD [B] - -       11 Potassium Dichromate  -  -       12 Balsam of [place] (Myroxylon Pereirae Resin) - -       13 Nickel Sulphate Hexahydrate - -       14 Mixed Dialkyl Thiourea - -       15 Paraben Mix [B] - -       16 Methyldibromo Glutaronitrile - -       17 Fragrance Mix 8% - -       18 2-Bromo-2-Nitropropane-1,3-Diol (Bronopol) CT - -       19 Lyral - -       20 Tixocortol-21- Pivalate CT - -       21 Diazolidinyl urea (Germall II) - -        22 Methyl Methacrylate - -       23 Cobalt (II) Chloride Hexahydrate - -       24 Fragrance Mix II  - -       25 Compositae Mix II - -       26 Benzoyl Peroxide - -       27 Bacitracin - -       28 Formaldehyde - -       29 Methylchloroisothiazolinone / Methylisothiazolinone - -       30 Corticosteroid Mix CT - -       31 Sodium Lauryl Sulfate - -       32 Lanolin Alcohol - -       33 Turpentine - -       34 Cetylstearylalcohol - -       35 Chlorhexidine Dicluconate - -       36 Budesonide - -       37 Imidazolidinyl Urea  - -       38 Ethyl-2 Cyanoacrylate - -       39 Quaternium 15 (Dowicil 200) - -       40 Decyl Glucoside - -     Compositae Mix II - common yarrow, mountain arnica, Sri Lankan chamomile, feverfew, and the common tansy   PRESERVATIVES & ANTIMICROBIALS        # Substance 2 days 4 days remarks   41 1  1,2-Benzisothiazoline-3-One, Sodium Salt - -     2  1,3,5-Kushal (2-Hydroxyethyl) - Hexahydrotriazine (Grotan BK) - -     3 6-Awcsgcwmiunvh-7-Nitro-1, 3-Propanediol NA NA     4  3, 4, 4' - Triclocarban - -    45 5 4 - Chloro - 3 - Cresol - -     6 4 - Chloro - 4 - Xylenol (PCMX) - -     7 7-Ethylbicyclooxazolidine (Bioban CU9162) - -     8 Benzalkonium Chloride CT - -     9 Benzyl Alcohol - -    50 10 Cetalkonium Chloride - -     11 Cetylpyrimidine Chloride  - -     12 Chloroacetamide - -     13 DMDM Hydantoin - -     14 Glutaraldehyde - -    55 15 Triclosan - -     16 Glyoxal Trimeric Dihydrate - -     17 Iodopropynyl Butylcarbamate - -     18 Octylisothiazoline - -      19 Acetophenone Azine - -    60 20 Bioban P 1487 (Nitrobutyl) Morpholine/(Ethylnitro-Trimethylene) Dimorpholine - -     21 Phenoxyethanol - -     22 Phenyl Salicylate - -     23 Povidone Iodine - -     24 Sodium Benzoate - -    65 25 Sodium Disulfite - -     26 Sorbic Acid - -     27 Thimerosal - -     28 Melamine Formaldehyde Resin - -     29 Ethylenediamine Dihydrochloride - -      Parabens      70 30 Butyl-P-Hydroxybenzoate - -     31 Ethyl-P-Hydroxybenzoate - -     32 Methyl-P-Hydroxybenzoate - -    73 33 Propyl-P-Hydroxybenzoate - -    EMULSIFIERS & ADDITIVES       # Substance 2 days 4 days remarks   74 1 Polyethylene Glycol-400 - -    75 2 Cocamidopropyl Betaine - -    76 3 Amerchol L101 ? -     4 Propylene Glycol - -     5 Triethanolamine - -     6 Sorbitane Sesquiolate CT - -    80 7 Isopropylmyristate - -     8 Polysorbate 80 CT - -     9 Amidoamine   (Stearamidopropyl Dimethylamine) - -     10 Oleamidopropyl Dimethylamine - -     11 Lauryl Glucoside - -    85 12 Coconut Diethanolamide  - -     13 2-Hydroxy-4-Methoxy Benzophenone (Oxybenzone) - -     14 Benzophenone-4 (Sulisobenzon) - -     15 Propolis - -     16 Dexpanthenol - -    90 17 Abitol (Hydroabietyl Alcohol) - -     18 Tert-Butylhydroquinone - -     19 Benzyl Salicylate - -     20 Dimethylaminopropylamin (DMPA) - -     21 Zinc Pyrithione (Zinc Omadine)  - -    95 22 Kushal(Hydroxymethyl) Nitromethane  - -      Antioxidant       23 Dodecyl Gallate - -     24 Butylhydroxyanisole (BHA) - -     25 Butylhydroxytoluene (BHT) - -     26 Di-Alpha-Tocopherol (Vit E) - -    100 27 Propyl Gallate - -    PERFUMES, FLAVORS & PLANTS        # Substance 2 days 4 days remarks   101 1 Benzyl Cinnamate - -     2 Di-Limonene (Dipentene) - -     3 Cananga Odorata (Felicitas Polk) (I) - -     4 Lichen Acid Mix - -    105 5 Mentha Piperita Oil (Peppermint Oil) - -     6 Sesquiterpenelactone mix - -     7 Tea Tree Oil, Oxidized - -     8 Wood Tar Mix - -     9 Abietic Acid  - -    110 10 Lavendula Angustifolia Oil (Lavender Oil) - -     11 Fragrance mix II CT * 14% - -      Fragrance Mix I       12 Oakmoss Absolute - -     13 Eugenol - -     14 Geraniol - -    115 15 Hydroxycitronellal - -     16 Isoeugenol - -     17 Cinnamic Aldehyde - -     18 Cinnamic Alcohol  - -      Fragrance mix II       19 Citronellol - -    120 20 Alpha-Hexylcinnamic Aldehyde    - -     21 Citral - -     22 Farnesol - -    123 23 Coumarin - -    Hexylcinnamic aldehyde, Coumarin, Farnesol, Hydroxyisohexy3-cyclohexene carboxaldehyde, citral, citrolellol  HAIRDRESSER        # Substance 2 days 4 days remarks   124 1 P-Phenylenediamine -  -    125 2 P-Aminodiphenylamine - -     3 P-Toluenediamine Sulphate  -  -     4 Ammonium Thioglycolate - -     5 Ammonium Persulfate - -     6 Resorcinol - -    130 7 3-Aminophenol - -     8 P-Aminophenol - -     9 Glyceryl Monothioglycolate - -    133 10 Pyrogallol - -    COLORANTS, DYES, FOOD ADDITIVES   # Substance 2 days 4 days remarks     Textile dyes      134 1 Michel Brown R - -    135 2 Disperse Blue-3 - -     3 Disperse Orange-3 - -     4 Disperse Red 1 - 1% - -     5 Disperse Yellow-9 1% - -     6 Disperse blue mix 106/124 - -    140 7 Disperse yellow 3 - -     8 Naphthol AS - -     9 Disperse Red 11 - -     10 Reactive Black 5 - -     11 Disperse Red 17 - -    145 12 Acid Yellow 61 - -     13 Acid Red 118 - -     14 Disperse Blue 35 - -     15 Basic Red 46 - -      Food dyes/additives       16 Metanil yellow (F/T) - -    150 17 Cochineal Red (F/T) - -     18 Brilliant Black - -     19 Erythrosine-B (F/T) - -     20 Aspartame - -     21 [place] (F/T) - -    155 22 Quinoline Yellow - -     23 Azorubine - -    157 24 Tartrazine - -       Results of patch tests:                         Interpretation:  - Negative                    A    = Allergic      (+) Erythema    TI   = Toxic/irritant   + E + Infiltration    RaP = Relevance at Present     ++ E/I + Papulovesicle   Rpr   = Relevance Previously     +++ E/I/P + Blister     nR   = No Relevance      [x] No relevant allergic reaction observed    [] Allergic reaction diagnosed against following allergens:      Interpretation/Remarks:   See later    Patient information given:  [] ACDS CAMP information (# ....) to following compounds:    [] General information to following compounds:       ____________________________________________    Assessment & Plan:    ==> Final Diagnosis:     # Recurrent therapy-resistant perioral dermatitis  DDx: Additionally irritant dermatitis with atopy  DDx: Allergic contact dermatitis   * chronic illness with exacerbation, progression, side effects from treatment    # Atopic predisposition with:   Slight morning rhinitis with strong dust mite sensitization  Seasonal rhinitis in spring (birch pollen) and fall (ragweed and mug wort)  Hypersensitive and hyperirritable skin  Possible nickel allergy  Rhinitis around cats --> prick test positive  * stable chronic illness    # Subacute to chronic eczema on back of hands  DDx: Irritant dermatitis with atopy  DDx: Allergic contact dermatitis   * chronic illness with exacerbation, progression, side effects from treatment    # 1x episode of urticaria in 1/2024. Resolved.    These conclusions are made at the best of one's knowledge and belief based on the provided evidence such as patient's history and allergy test results and they can change over time or can be incomplete because of missing information.    ==> Treatment Plan:     >> Will update below treatment plan on Thursday after 2nd readings and final conclusions:   >> Patch tests are appropriate, as dermatitis has improved, but she still has flares. Until patch tests can be completed:  - Encouraged daily moisturization of face and body with a gentle moisturizer, such as Vanicream, Cetaphil, or CeraVe.  - Continue with CeraVe facial wash.  - Continue with Elidel: apply BID on face, hands, and rest of body if skin is  red.   - Continue with Protopic BID on hands.  - Once face is no longer as red, for maintenance:  - Apply MetroCream prescribed by [facility], and then Elidel QPM.  - Allergy RN will send CPT codes for planned allergy tests via [facility] at patient's request.    >> For house dust mite allergy:  - Continue with for house dust mite reduction.  - Can take Claritin or Zyrtec before she goes to bed.     Procedures Performed: None    Staff and Scribe: provider    Scribe Disclosure:   I, NOVA KIRKPATRICK, am serving as a scribe to document services personally performed by See Hawkins MD based on data collection and the provider's statements to me.     Staff Physician Comments:  I was present with the scribe who participated in the documentation of the note. I have verified the history and personally performed the physical exam and medical decision making. I agree with the assessment and plan as documented in the note. I have reviewed and if necessary amended the note.      See Hawkins MD  Professor  Head of Dermato-Allergy Division  Department of Dermatology  [facility]    Follow-up in Derm-Allergy clinic for 2nd readings and final conclusions after 4 days   - not currently scheduled to follow up with any dermatology provider via [facility] (was self-referred)  ___________________________    I spent a total of 12 minutes with Bess Ellis during today s visit. This time was spent discussing all the individual test results, correlating them to the clinical relevance, counseling the patient and/or coordinating care       Again, thank you for allowing me to participate in the care of your patient.        Sincerely,        See Hawkins MD    Electronically signed

## 2025-04-02 NOTE — NURSING NOTE
Chief Complaint   Patient presents with    Allergy Testing Followup     Patch day 3     Silviano Baker RN

## 2025-04-02 NOTE — PROGRESS NOTES
[facility] Dermato-allergology Note  Office visit  Encounter Date: Apr 2, 2025  ____________________________________________    CC: Allergy Testing Followup (Patch day 3)      HPI:  (Apr 2, 2025)  Ms. Bess Ellis is a(n) 35 year old female who presents today as a return patient for allergy tests as planned  - Follow-up in Derm-Allergy clinic for 1st readings of patch tests after 2 days   - Otherwise feeling well in usual state of health    Physical Exam:  General: In no acute distress, well-developed, well-nourished  Eyes: no conjunctivitis  ENT: no signs of rhinitis   Pulmonary: no wheezing or coughing  Skin: Focused examination of the skin on test sites was performed = see test results below  - No active eczematous skin lesions on tests sites, particularly back    Earlier History and Allergy Exams:  (Mar 31, 2025)  - Follow-up in Derm-Allergy clinic for patch tests as planned for week of 3/31/25  - She states her face is doing much better, but she still has some itching   - She continues to use Elidel for the face, not everyday  - She is also using Protopic for her hands    Focused examination of the face and hands was performed.  - Dry red skin on the knuckles  - Slight redness around the nose    (Mar 12, 2025)  - Follow-up in Derm-Allergy clinic in spring 2025 for allergy tests as planned, with check-in (can be virtual or in person) 2 weeks before to determine if patch tests are still necessary  - Reports she is doing better  - Had been putting on topicals (including Elidel) consistently every day until start of 2025, and then has gradually lessened use to just PRN  - Still having some itchiness and irritation on hands, face (around eyes and mouth), just not as severe  - Using Elidel on face  - Was using Elidel on hands, but switched to Protopic     Skin: Focused examination of the face and hands was performed.  - Little bit of erythema with some papules on the base of the nose.  - On the  "back of the hands over the knuckles mostly, there is xerosis.     (Sep 25, 2024)  New patient for allergy consultation  - Self-referred primarily for spreading hives, but also for perioral dermatitis and eczema  - No prior allergy records in [facility]  - However, multiple claims-derived visit encounters (without actual visit notes) in Epic from 1/17/19 to 1/25/24 with Dr. Ayesha Hernandez ([facility]) or various dermatology concerns, with rash and dermatitis as dx starting 6/10/19  - Additional claims-derived visit on 3/28/24 with Dr. Zion Zabala ([facility]) for idiopathic urticaria, dermatitis, pruritus, and dyshidrosis  - Then 5/6/24 and 6/24/24 claims-derived visits with Dr. Lacie Hinds ([facility]) for idiopathic urticaria, perioral dermatitis, pruritus, and dyshidrosis  - Starting 1/2024  - Face, eyes, mouth, neck, and chest, abdomen, and later on occasionally on legs  - Would have tiny little bumps that became really itchy and red  - Would last for a couple days  - Denies skin being dry or scaly  - Also has hand eczema  - Also has perioral dermatitis  - Has tried Protopic (face), steroids, antihistamines, doxycycline, and metronidazole  - 1st tried topical steroids on face  - Then doxycycline  - When stopped doxycycline, couldn't sleep at night with itchy face  - At that time told to stop topical steroids on face, and given 2nd course of doxycycline: BID x 1 month, and the QD x 1 month  - Started 3rd month of doxycyline, has started to taper off, and sometimes feels a little itchy  - Uses CeraVe face wash; does not use BPO (tried years ago and had a reaction)  - Last dyed her hair ~1 year ago  - Has chronically dry eyes  - Skin care routine is \"basic\": includes CeraVe as above, tretinoin, vitamin C and eye creams    Focused examination of the face and hands was performed.  - Today, on her hands, there is a slightly erythematous xerotic 5 cm x 3 cm plaque on right dorsal palm, and slightly less on " left hand. Back of hands are xerotic.   - 1/2/2024 photos on patient's phone: Papules around the mouth on the chin area.  - 5/2/24 photo on patient's phone: Clear perioral dermatitis with typical pustules around both corners of the mouth. She reports she heard about treatment for atopic dermatitis with Garvin's soap and azelaic acid, which she was using at this time.    Past Medical History:   Patient Active Problem List   Diagnosis    IUD contraception    Other irritable bowel syndrome    ASCUS with positive high risk HPV cervical    Acne vulgaris    Dysmenorrhea    Food protein induced enterocolitis syndrome    Constipation, unspecified constipation type    Dyspareunia in female    Mild major depression     Past Medical History:   Diagnosis Date    Abnormal Pap smear of cervix 02/28/2018 02/28/18: see problem list.    Cervical high risk HPV (human papillomavirus) test positive 03/05/2019 03/05/19: See problem list.     Depressive disorder 2021     Allergies:  No Known Allergies    Medications:  Current Outpatient Medications   Medication Sig Dispense Refill    Cholecalciferol (VITAMIN D-3 PO) Take 125 mcg by mouth daily      Cyanocobalamin (B-12 PO) Take 1 tablet by mouth daily      hydrocortisone 2.5 % cream Apply 1 Application topically as needed      levonorgestrel (MIRENA) 20 MCG/DAY IUD 1 each by Intrauterine route once (Liletta IUD - placed at [facility] in 11/21)      Magnesium 300 MG CAPS Take 1 tablet by mouth daily      metroNIDAZOLE (METROCREAM) 0.75 % external cream       Omega-3 Fatty Acids (FISH OIL) 1200 MG capsule Take 1,200 mg by mouth daily      pimecrolimus (ELIDEL) 1 % external cream Apply topically 2 times daily. On face (is less greasy than Protopic) and therefore better for dermatitis in face 30 g 2    Probiotic Product (PROBIOTIC DAILY PO)       spironolactone (ALDACTONE) 50 MG tablet Take 50 mg by mouth daily Through dermatology      tacrolimus (PROTOPIC) 0.1 %  external ointment       tretinoin (RETIN-A) 0.01 % external gel Apply topically At Bedtime       No current facility-administered medications for this visit.     Family History:  Family History   Problem Relation Age of Onset    Cancer Mother 57        CLL, donig well, no txt    Gastrointestinal Disease Mother         IBS    Other Cancer Mother         CLL    Gastrointestinal Disease Sister         IBS    Depression Sister     Anxiety Disorder Sister     Cancer Maternal Grandmother 65        CML, had txt,  at 90, unsure cause of death    Coronary Artery Disease Maternal Grandfather         later in life    Colon Cancer Paternal Grandmother         60s    Coronary Artery Disease Paternal Grandfather         later in life    Colon Cancer Paternal Grandfather         60s    Hypertension Father     Cerebrovascular Disease Father     Anxiety Disorder Father     Glaucoma No family hx of     Macular Degeneration No family hx of    Positive for allergies. Negative for eczema and asthma.    Previous Labs, Allergy Tests, Dermatopathology, Imaging:  See HPI - no records in Epic as of 24.    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 Does not wear gloves at work.  Patient has the following hobbies or non-occupational exposure: weightlifting (will apply , she is unsure of material) and dancing.    Order for Future Allergy Testing:    [x] Outpatient  [] Inpatient: [facility]..../ Bed ...    Skin Atopy (atopic dermatitis)? [x] Yes     [] No  Comments: diagnosed by outside dermatology - see HPI  Childhood eczema?   [] Yes     [x] No  Comments:   Hand eczema?   [x] Yes     [] No  If yes, leading hand? [] Right     [] Left     [] Ambidextrous  Comments: see HPI    Contact allergies?   [x] Yes     [] No  Comments:   Including adhesives/bandages? [] Yes     [x] No  Comments:   Including metals?   [x] Yes     [] No  Comments: earrings:  sometimes would have itchy ears with slight redness and swelling when wearing cheap jewelry    Drug allergies?   [] Yes     [x] No  Comments:     Angioedema?   [] Yes     [] No  Comments:     Urticaria?   [x] Yes     [] No  Comments: see HPI - reason for visit    Food allergies?   [] Yes     [x] No  Comments: eats cherries daily without mouth/throat itching    Pet allergies?   [] Yes     [x] No  Comments: just got a lab retriever puppy 2 days ago; does not have symptoms around; sneezing if she touches cats    Environmental allergy symptoms?  [] Conjunctivitis  [] Otitis  [] Pharyngitis  [] Polyposis  [] Postnasal Drip  [x] Rhinitis - slightly stuffy  [] Sinusitis  [] None  Comments:     HENT Operations?  [] Adenoids [] Septum [] Sinus  [] Tonsils        [] Other:   [] None  Comments:     Pulmonary symptoms (from birth to present)?  [] Asthma bronchiale  Inhaler(s)?:   [] Coughing  [] Other  [x] None  Comments:     Environmental and pulmonary symptoms aggravated by?  Season: [] None     [] I     [] II     [x] III     [x] IV     [x] V     [] VI          [] VII     [x] VIII     [x] IX     [x] X     [] XI     [] XII     [] Perennial  Time of Day: [] None     [x] Morning (once moving to new house recently)     [] Noon     [] Evening     [] Night     [] Whole Day  Location/Changes: [] None     [] Inside     [] Outside     [] Mountain     [] Sea     [] Other:   Triggers (specific): [] None     [] Animals     [] Dust     [] Mold     [] Plants     [] Other:   Triggers (other): [] None     [] Psyche     [] Sport     [] Work     [] Other:   Irritant: [] None     [] Cold     [] Heat     [] Odors     [] Physical Efforts     [] Smoke     [] Other:     Order for PATCH TESTS  Reason for tests (suspected allergy): recurrent therapy-resistant perioral dermatitis  Known previous allergies: none  Standardized panels  [x] Standard panel (40 tests)  [x] Preservatives & Antimicrobials (31 tests)  [x] Emulsifiers & Additives (25 tests)    [x] Perfumes/Flavours & Plants (25 tests)  [x] Hairdresser panel (12 tests)  [] Rubber Chemicals (22 tests)  [] Plastics (26 tests)  [x] Colorants/Dyes/Food additives (20 tests)  [] Metals (implants/dental) (24 tests)  [] Local anaesthetics/NSAIDs (13 tests)  [] Antibiotics & Antimycotics (14 tests)   [] Corticosteroids (15 tests)   [] Photopatch test (62 tests)   [] Others:     [] Patient's Own Products:   DO NOT test if chemical or biological identity is unknown!   always ask from patient the product information and safety sheets (MSDS)       Order for PRICK TESTS    Reason for tests (suspected allergy): atopy?  Known previous allergies: none    Standardized prick panels  [x] Atopic panel (20 tests)  [] Pediatric Panel (12 tests)  [] Milk, Meat, Eggs, Grains (20 tests)   [] Dust, Epithelia, Feathers (10 tests)  [] Fish, Seafood, Shellfish (17 tests)  [] Nuts, Beans (14 tests)  [] Spice, Vegetable, Fruit (17 tests)  [] Pollen Panel = Tree, Grass, Weed (24 tests)  [] Others:     [] Patient's Own Products:   DO NOT test if chemical or biological identity is unknown!   always ask from patient the product information and safety sheets (MSDS)     Standardized intradermal tests  [] Alternaria alternata  [] Aspergillus fumigatus  [] Cladosporium herbarum   [] Penicillium notatum  [] Dermatophagoides farinae  [] Dermatophagoides pteronyssinus  [] Dog Epithelium  [] Cat Epithelium  [] Others:     [] Bee venom   [] Wasp venom  !!Specific protocol with dilutions!!       Order for Drug allergy tests (prick & intradermal & patch tests)    [] Penicillin G     [] Ampicillin   [] Cefazolin        [] Ceftriaxone     [] Ceftazidime     [] Cefepime     [] Cefuroxime  [] Bactrim  [] Iodixanol     [] Iopamidol        [] Iohexol  [] Others:   [] Patient's Own:   Order for ... as test date      ____________________________________________      Atopy Screen (Placed Sep 25, 2024)  No Substance Readings (15 min) Evaluation   POS Histamine  1mg/ml +/++    NEG NaCl 0.9% -      No Substance Readings (15 min) Evaluation   1 Alternaria alternata (tenuis)  +    2 Cladosporium herbarum +    3 Aspergillus fumigatus -    4 Penicillium notatum -    5 Dermatophagoides pteronyssinus ++    6 Dermatophagoides farinae +++    7 Dog epithelium (canis spp) +    8 Cat hair (tiffanie catus) ++    9 Cockroach   (Blatella americana & germanica) -    10 Grass mix midwest   (Estefani, Orchard, Redtop, Agus) -    11 Jonh grass (sorghum halepense) -    12 Weed mix   (common Cocklebur, Lamb s quarters, rough redroot Pigweed, Dock/Sorrel) -    13 Mug wort (artemisia vulgare) +    14 Ragweed giant/short (ambrosia spp) +++    15 White birch (Betula papyrifera) +++    16 Tree mix 1 (Pecan, Maple BHR, Oak RVW, american [place], black Epps) +    17 Red cedar (juniperus virginia) -    18 Tree mix 2   (white Nabil, river/red Birch, black [place] [employer], common [place], american Elm) +/++    19 Box elder/Maple mix (acer spp) -    20 [place] shagbark (carya ovata) -    Conclusion:      ____________________________________________      RESULTS & EVALUATION of PATCH TESTS    Mar 31, 2025 application of patch tests:    Patch test readings after     [x] 2 days, [x] 3 days [] 4 days, [] 5 days,  Other duration: ...      STANDARD Series                                          # Substance 2 days 3 days remarks     1 Igor Mix III 10% - -       2 Colophony - -       3  2-Mercaptobenzothiazole  - -       4 Methylisothiazolinone - -       5 Carba Mix - -       6 Thiuram Mix [A] - -       7 Bisphenol A Epoxy Resin - -       8 J-Kgph-Lakaoxonjep-Formaldehyde Resin - -       9 Mercapto Mix [A] - -       10 Black Rubber Mix- PPD [B] - -       11 Potassium Dichromate  -  -       12 Balsam of [place] (Myroxylon Pereirae Resin) - -       13 Nickel Sulphate Hexahydrate - -       14 Mixed Dialkyl Thiourea - -       15 Paraben Mix [B] - -       16 Methyldibromo Glutaronitrile - -       17 Fragrance Mix 8% - -        18 2-Bromo-2-Nitropropane-1,3-Diol (Bronopol) CT - -       19 Lyral - -       20 Tixocortol-21- Pivalate CT - -       21 Diazolidinyl urea (Germall II) - -        22 Methyl Methacrylate - -       23 Cobalt (II) Chloride Hexahydrate - -       24 Fragrance Mix II  - -       25 Compositae Mix II - -       26 Benzoyl Peroxide - -       27 Bacitracin - -       28 Formaldehyde - -       29 Methylchloroisothiazolinone / Methylisothiazolinone - -       30 Corticosteroid Mix CT - -       31 Sodium Lauryl Sulfate - -       32 Lanolin Alcohol - -       33 Turpentine - -       34 Cetylstearylalcohol - -       35 Chlorhexidine Dicluconate - -       36 Budesonide - -       37 Imidazolidinyl Urea  - -       38 Ethyl-2 Cyanoacrylate - -       39 Quaternium 15 (Dowicil 200) - -       40 Decyl Glucoside - -     Compositae Mix II - common yarrow, mountain arnica, Estonian chamomile, feverfew, and the common tansy   PRESERVATIVES & ANTIMICROBIALS        # Substance 2 days 3 days remarks   41 1  1,2-Benzisothiazoline-3-One, Sodium Salt - -     2  1,3,5-Kushal (2-Hydroxyethyl) - Hexahydrotriazine (Grotan BK) - -     3 2-Grgthjaavwcvt-1-Nitro-1, 3-Propanediol NA NA     4  3, 4, 4' - Triclocarban - -    45 5 4 - Chloro - 3 - Cresol - -     6 4 - Chloro - 4 - Xylenol (PCMX) - -     7 7-Ethylbicyclooxazolidine (Bioban [id number]) - -     8 Benzalkonium Chloride CT - -     9 Benzyl Alcohol - -    50 10 Cetalkonium Chloride - -     11 Cetylpyrimidine Chloride  - -     12 Chloroacetamide - -     13 DMDM Hydantoin - -     14 Glutaraldehyde - -    55 15 Triclosan - -     16 Glyoxal Trimeric Dihydrate - -     17 Iodopropynyl Butylcarbamate - -     18 Octylisothiazoline - -     19 Acetophenone Azine - -    60 20 Bioban P 1487 (Nitrobutyl) Morpholine/(Ethylnitro-Trimethylene) Dimorpholine - -     21 Phenoxyethanol - -     22 Phenyl Salicylate - -     23 Povidone Iodine - -     24 Sodium Benzoate - -    65 25 Sodium Disulfite - -     26 Sorbic Acid - -      27 Thimerosal - -     28 Melamine Formaldehyde Resin - -     29 Ethylenediamine Dihydrochloride - -      Parabens      70 30 Butyl-P-Hydroxybenzoate - -     31 Ethyl-P-Hydroxybenzoate - -     32 Methyl-P-Hydroxybenzoate - -    73 33 Propyl-P-Hydroxybenzoate - -    EMULSIFIERS & ADDITIVES       # Substance 2 days 3 days remarks   74 1 Polyethylene Glycol-400 - -    75 2 Cocamidopropyl Betaine - -    76 3 Amerchol L101 ? -     4 Propylene Glycol - -     5 Triethanolamine - -     6 Sorbitane Sesquiolate CT - -    80 7 Isopropylmyristate - -     8 Polysorbate 80 CT - -     9 Amidoamine   (Stearamidopropyl Dimethylamine) - -     10 Oleamidopropyl Dimethylamine - -     11 Lauryl Glucoside - -    85 12 Coconut Diethanolamide  - -     13 2-Hydroxy-4-Methoxy Benzophenone (Oxybenzone) - -     14 Benzophenone-4 (Sulisobenzon) - -     15 Propolis - -     16 Dexpanthenol - -    90 17 Abitol (Hydroabietyl Alcohol) - -     18 Tert-Butylhydroquinone - -     19 Benzyl Salicylate - -     20 Dimethylaminopropylamin (DMPA) - -     21 Zinc Pyrithione (Zinc Omadine)  - -    95 22 Kushal(Hydroxymethyl) Nitromethane  - -      Antioxidant       23 Dodecyl Gallate - -     24 Butylhydroxyanisole (BHA) - -     25 Butylhydroxytoluene (BHT) - -     26 Di-Alpha-Tocopherol (Vit E) - -    100 27 Propyl Gallate - -    PERFUMES, FLAVORS & PLANTS        # Substance 2 days 3 days remarks   101 1 Benzyl Cinnamate - -     2 Di-Limonene (Dipentene) - -     3 Cananga Odorata (Ylang Ylang) (I) - -     4 Lichen Acid Mix - -    105 5 Mentha Piperita Oil (Peppermint Oil) - -     6 Sesquiterpenelactone mix - -     7 Tea Tree Oil, Oxidized - -     8 Wood Tar Mix - -     9 Abietic Acid - -    110 10 Lavendula Angustifolia Oil (Lavender Oil) - -     11 Fragrance mix II CT * 14% - -      Fragrance Mix I       12 Oakmoss Absolute - -     13 Eugenol - -     14 Geraniol - -    115 15 Hydroxycitronellal - -     16 Isoeugenol - -     17 Cinnamic Aldehyde - -     18  Cinnamic Alcohol  - -      Fragrance mix II       19 Citronellol - -    120 20 Alpha-Hexylcinnamic Aldehyde    - -     21 Citral - -     22 Farnesol - -    123 23 Coumarin - -    Hexylcinnamic aldehyde, Coumarin, Farnesol, Hydroxyisohexy3-cyclohexene carboxaldehyde, citral, citrolellol  HAIRDRESSER        # Substance 2 days 3 days remarks   124 1 P-Phenylenediamine -  -    125 2 P-Aminodiphenylamine - -     3 P-Toluenediamine Sulphate  -  -     4 Ammonium Thioglycolate - -     5 Ammonium Persulfate - -     6 Resorcinol - -    130 7 3-Aminophenol - -     8 P-Aminophenol - -     9 Glyceryl Monothioglycolate - -    133 10 Pyrogallol - -    COLORANTS, DYES, FOOD ADDITIVES   # Substance 2 days 3 days remarks     Textile dyes      134 1 Michel Brown R - -    135 2 Disperse Blue-3 - -     3 Disperse Orange-3 - -     4 Disperse Red 1 - 1% - -     5 Disperse Yellow-9 1% - -     6 Disperse blue mix 106/124 - -    140 7 Disperse yellow 3 - -     8 Naphthol AS - -     9 Disperse Red 11 - -     10 Reactive Black 5 - -     11 Disperse Red 17 - -    145 12 Acid Yellow 61 - -     13 Acid Red 118 - -     14 Disperse Blue 35 - -     15 Basic Red 46 - -      Food dyes/additives       16 Metanil yellow (F/T) - -    150 17 Cochineal Red (F/T) - -     18 Brilliant Black - -     19 Erythrosine-B (F/T) - -     20 Aspartame - -     21 [place] (F/T) - -    155 22 Quinoline Yellow - -     23 Azorubine - -    157 24 Tartrazine - -       Results of patch tests:                         Interpretation:  - Negative                    A    = Allergic      (+) Erythema    TI   = Toxic/irritant   + E + Infiltration    RaP = Relevance at Present     ++ E/I + Papulovesicle   Rpr  = Relevance Previously     +++ E/I/P + Blister     nR   = No Relevance      [x] No relevant allergic reaction observed    [] Allergic reaction diagnosed against following allergens:      Interpretation/Remarks:   See later    Patient information given:  [] ACDS CAMP  information (# ....) to following compounds:    [] General information to following compounds:       ____________________________________________    Assessment & Plan:    ==> Final Diagnosis:     # Recurrent therapy-resistant perioral dermatitis  DDx: Additionally irritant dermatitis with atopy  DDx: Allergic contact dermatitis   * chronic illness with exacerbation, progression, side effects from treatment    # Atopic predisposition with:   Slight morning rhinitis with strong dust mite sensitization  Seasonal rhinitis in spring (birch pollen) and fall (ragweed and mug wort)  Hypersensitive and hyperirritable skin  Possible nickel allergy  Rhinitis around cats --> prick test positive  * stable chronic illness    # Subacute to chronic eczema on back of hands  DDx: Irritant dermatitis with atopy  DDx: Allergic contact dermatitis   * chronic illness with exacerbation, progression, side effects from treatment    # 1x episode of urticaria in 1/2024. Resolved.    These conclusions are made at the best of one's knowledge and belief based on the provided evidence such as patient's history and allergy test results and they can change over time or can be incomplete because of missing information.    ==> Treatment Plan:     >> Will update below treatment plan on Thursday after 2nd readings and final conclusions:   >> Patch tests are appropriate, as dermatitis has improved, but she still has flares. Until patch tests can be completed:  - Encouraged daily moisturization of face and body with a gentle moisturizer, such as Vanicream, Cetaphil, or CeraVe.  - Continue with CeraVe facial wash.  - Continue with Elidel: apply BID on face, hands, and rest of body if skin is red.   - Continue with Protopic BID on hands.  - Once face is no longer as red, for maintenance:  - Apply MetroCream prescribed by [facility], and then Elidel QPM.  - Allergy RN will send CPT codes for planned allergy tests via [employer] at patient's  request.    >> For house dust mite allergy:  - Continue with for house dust mite reduction.  - Can take Claritin or Zyrtec before she goes to bed.     Procedures Performed: None    Staff and Scribe: provider    Scribe Disclosure:   I, NOVA KIRKPATRICK, am serving as a scribe to document services personally performed by See Hawkins MD based on data collection and the provider's statements to me.     Staff Physician Comments:  I was present with the scribe who participated in the documentation of the note. I have verified the history and personally performed the physical exam and medical decision making. I agree with the assessment and plan as documented in the note. I have reviewed and if necessary amended the note.      See Hawkins MD  Professor  Head of Dermato-Allergy Division  Department of Dermatology  [facility]    Follow-up in Derm-Allergy clinic for 2nd readings and final conclusions after 3 days   - not currently scheduled to follow up with any dermatology provider via [facility] (was self-referred)  ___________________________    I spent a total of 12 minutes with Bess Ellis during today s visit. This time was spent discussing all the individual test results, correlating them to the clinical relevance, counseling the patient and/or coordinating care

## 2025-04-03 ENCOUNTER — OFFICE VISIT (OUTPATIENT)
Dept: ALLERGY | Facility: CLINIC | Age: 35
End: 2025-04-03
Payer: COMMERCIAL

## 2025-04-03 DIAGNOSIS — J30.2 SEASONAL AND PERENNIAL ALLERGIC RHINOCONJUNCTIVITIS: ICD-10-CM

## 2025-04-03 DIAGNOSIS — L71.0 PERIORAL DERMATITIS: ICD-10-CM

## 2025-04-03 DIAGNOSIS — Z88.9 ATOPY: ICD-10-CM

## 2025-04-03 DIAGNOSIS — Z91.09 HOUSE DUST MITE ALLERGY: ICD-10-CM

## 2025-04-03 DIAGNOSIS — L20.89 OTHER ATOPIC DERMATITIS: Primary | ICD-10-CM

## 2025-04-03 DIAGNOSIS — H10.10 SEASONAL AND PERENNIAL ALLERGIC RHINOCONJUNCTIVITIS: ICD-10-CM

## 2025-04-03 DIAGNOSIS — L23.5 ALLERGIC DERMATITIS DUE TO OTHER CHEMICAL PRODUCT: ICD-10-CM

## 2025-04-03 DIAGNOSIS — L23.0 ALLERGIC CONTACT DERMATITIS DUE TO METALS: ICD-10-CM

## 2025-04-03 DIAGNOSIS — J30.89 SEASONAL AND PERENNIAL ALLERGIC RHINOCONJUNCTIVITIS: ICD-10-CM

## 2025-04-03 PROCEDURE — 99214 OFFICE O/P EST MOD 30 MIN: CPT | Performed by: DERMATOLOGY

## 2025-04-03 NOTE — LETTER
4/3/2025      Bess Ellis  [address]  [facility] [address]      Dear Colleague,    Thank you for referring your patient, Bess Ellis, to the [facility] [place]. Please see a copy of my visit note below.    [facility] Note  Office visit  Encounter Date: Apr 3, 2025  ____________________________________________    CC: Allergy Testing Followup (Patch day 4)      HPI:  (Apr 3, 2025)  Ms. Bess Ellis is a(n) 35 year old female who presents today as a return patient for allergy tests as planned  - Follow-up in Derm-Allergy clinic for 2nd readings and final conclusions after 3 days   - Feels like eczema on hands is always there   - Otherwise feeling well in usual state of health    Physical Exam:  General: In no acute distress, well-developed, well-nourished  Eyes: no conjunctivitis  ENT: no signs of rhinitis   Pulmonary: no wheezing or coughing  Skin: Focused examination of the skin on test sites was performed = see test results below  No active eczematous skin lesions on tests sites, particularly back    Earlier History and Allergy Exams:  (Apr 2, 2025)  Presents today as a return patient for allergy tests as planned  - Follow-up in Derm-Allergy clinic for 1st readings of patch tests after 2 days     (Mar 31, 2025)  - Follow-up in Derm-Allergy clinic for patch tests as planned for week of 3/31/25  - She states her face is doing much better, but she still has some itching   - She continues to use Elidel for the face, not everyday  - She is also using Protopic for her hands    Focused examination of the face and hands was performed.  - Dry red skin on the knuckles  - Slight redness around the nose    (Mar 12, 2025)  - Follow-up in Derm-Allergy clinic in spring 2025 for allergy tests as planned, with check-in (can be virtual or in person) 2 weeks before to determine if patch tests are still necessary  - Reports she is doing better  - Had been  putting on topicals (including Elidel) consistently every day until start of 2025, and then has gradually lessened use to just PRN  - Still having some itchiness and irritation on hands, face (around eyes and mouth), just not as severe  - Using Elidel on face  - Was using Elidel on hands, but switched to Protopic     Skin: Focused examination of the face and hands was performed.  - Little bit of erythema with some papules on the base of the nose.  - On the back of the hands over the knuckles mostly, there is xerosis.     (Sep 25, 2024)  New patient for allergy consultation  - Self-referred primarily for spreading hives, but also for perioral dermatitis and eczema  - No prior allergy records in [facility]  - However, multiple claims-derived visit encounters (without actual visit notes) in Epic from 1/17/19 to 1/25/24 with Dr. A[facility]ha [facility] ([facility] or various dermatology concerns, with rash and dermatitis as dx starting 6/10/19  - Additional claims-derived visit on 3/28/24 with Dr. Zion Zabala ([facility]) for idiopathic urticaria, dermatitis, pruritus, and dyshidrosis  - Then 5/6/24 and 6/24/24 claims-derived visits with Dr. Lacie Hinds ([facility]) for idiopathic urticaria, perioral dermatitis, pruritus, and dyshidrosis  - Starting 1/2024  - Face, eyes, mouth, neck, and chest, abdomen, and later on occasionally on legs  - Would have tiny little bumps that became really itchy and red  - Would last for a couple days  - Denies skin being dry or scaly  - Also has hand eczema  - Also has perioral dermatitis  - Has tried Protopic (face), steroids, antihistamines, doxycycline, and metronidazole  - 1st tried topical steroids on face  - Then doxycycline  - When stopped doxycycline, couldn't sleep at night with itchy face  - At that time told to stop topical steroids on face, and given 2nd course of doxycycline: BID x 1 month, and the QD x 1 month  - Started 3rd month of doxycyline, has started to taper off,  "and sometimes feels a little itchy  - Uses CeraVe face wash; does not use BPO (tried years ago and had a reaction)  - Last dyed her hair ~1 year ago  - Has chronically dry eyes  - Skin care routine is \"basic\": includes CeraVe as above, tretinoin, vitamin C and eye creams    Focused examination of the face and hands was performed.  - Today, on her hands, there is a slightly erythematous xerotic 5 cm x 3 cm plaque on right dorsal palm, and slightly less on left hand. Back of hands are xerotic.   - 1/2/2024 photos on patient's phone: Papules around the mouth on the chin area.  - 5/2/24 photo on patient's phone: Clear perioral dermatitis with typical pustules around both corners of the mouth. She reports she heard about treatment for atopic dermatitis with Brinson's soap and azelaic acid, which she was using at this time.    Past Medical History:   Patient Active Problem List   Diagnosis     IUD contraception     Other irritable bowel syndrome     ASCUS with positive high risk HPV cervical     Acne vulgaris     Dysmenorrhea     Food protein induced enterocolitis syndrome     Constipation, unspecified constipation type     Dyspareunia in female     Mild major depression     Past Medical History:   Diagnosis Date     Abnormal Pap smear of cervix 02/28/2018 02/28/18: see problem list.     Cervical high risk HPV (human papillomavirus) test positive 03/05/2019 03/05/19: See problem list.      Depressive disorder 2021     Allergies:  No Known Allergies    Medications:  Current Outpatient Medications   Medication Sig Dispense Refill     Cholecalciferol (VITAMIN D-3 PO) Take 125 mcg by mouth daily       Cyanocobalamin (B-12 PO) Take 1 tablet by mouth daily       hydrocortisone 2.5 % cream Apply 1 Application topically as needed       levonorgestrel (MIRENA) 20 MCG/DAY IUD 1 each by Intrauterine route once (Liletta IUD - placed at [facility] in 11/21)       Magnesium 300 MG CAPS Take 1 tablet by mouth " daily       metroNIDAZOLE (METROCREAM) 0.75 % external cream        Omega-3 Fatty Acids (FISH OIL) 1200 MG capsule Take 1,200 mg by mouth daily       pimecrolimus (ELIDEL) 1 % external cream Apply topically 2 times daily. On face (is less greasy than Protopic) and therefore better for dermatitis in face 30 g 2     Probiotic Product (PROBIOTIC DAILY PO)        spironolactone (ALDACTONE) 50 MG tablet Take 50 mg by mouth daily Through dermatology       tacrolimus (PROTOPIC) 0.1 % external ointment        tretinoin (RETIN-A) 0.01 % external gel Apply topically At Bedtime       No current facility-administered medications for this visit.     Family History:  Family History   Problem Relation Age of Onset     Cancer Mother 57        CLL, donig well, no txt     Gastrointestinal Disease Mother         IBS     Other Cancer Mother         CLL     Gastrointestinal Disease Sister         IBS     Depression Sister      Anxiety Disorder Sister      Cancer Maternal Grandmother 65        CML, had txt,  at 90, unsure cause of death     Coronary Artery Disease Maternal Grandfather         later in life     Colon Cancer Paternal Grandmother         60s     Coronary Artery Disease Paternal Grandfather         later in life     Colon Cancer Paternal Grandfather         60s     Hypertension Father      Cerebrovascular Disease Father      Anxiety Disorder Father      Glaucoma No family hx of      Macular Degeneration No family hx of    Positive for allergies. Negative for eczema and asthma.    Previous Labs, Allergy Tests, Dermatopathology, Imaging:  See HPI - no records in Epic as of 24.    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 Does not wear gloves at work.  Patient has the following hobbies or non-occupational exposure: weightlifting (will apply , she is unsure of material) and dancing.    Order for Future Allergy Testing:    [x]  Outpatient  [] Inpatient: [facility]..../ Bed ...    Skin Atopy (atopic dermatitis)? [x] Yes     [] No  Comments: diagnosed by outside dermatology - see HPI  Childhood eczema?   [] Yes     [x] No  Comments:   Hand eczema?   [x] Yes     [] No  If yes, leading hand? [] Right     [] Left     [] Ambidextrous  Comments: see HPI    Contact allergies?   [x] Yes     [] No  Comments:   Including adhesives/bandages? [] Yes     [x] No  Comments:   Including metals?   [x] Yes     [] No  Comments: earrings: sometimes would have itchy ears with slight redness and swelling when wearing cheap jewelry    Drug allergies?   [] Yes     [x] No  Comments:     Angioedema?   [] Yes     [] No  Comments:     Urticaria?   [x] Yes     [] No  Comments: see HPI - reason for visit    Food allergies?   [] Yes     [x] No  Comments: eats cherries daily without mouth/throat itching    Pet allergies?   [] Yes     [x] No  Comments: just got a lab retriever puppy 2 days ago; does not have symptoms around; sneezing if she touches cats    Environmental allergy symptoms?  [] Conjunctivitis  [] Otitis  [] Pharyngitis  [] Polyposis  [] Postnasal Drip  [x] Rhinitis - slightly stuffy  [] Sinusitis  [] None  Comments:     HENT Operations?  [] Adenoids [] Septum [] Sinus  [] Tonsils        [] Other:   [] None  Comments:     Pulmonary symptoms (from birth to present)?  [] Asthma bronchiale  Inhaler(s)?:   [] Coughing  [] Other  [x] None  Comments:     Environmental and pulmonary symptoms aggravated by?  Season: [] None     [] I     [] II     [x] III     [x] IV     [x] V     [] VI          [] VII     [x] VIII     [x] IX     [x] X     [] XI     [] XII     [] Perennial  Time of Day: [] None     [x] Morning (once moving to new house recently)     [] Noon     [] Evening     [] Night     [] Whole Day  Location/Changes: [] None     [] Inside     [] Outside     [] Mountain     [] Sea     [] Other:   Triggers (specific): [] None     [] Animals     [] Dust     [] Mold     []  Plants     [] Other:   Triggers (other): [] None     [] Psyche     [] Sport     [] Work     [] Other:   Irritant: [] None     [] Cold     [] Heat     [] Odors     [] Physical Efforts     [] Smoke     [] Other:     Order for PATCH TESTS  Reason for tests (suspected allergy): recurrent therapy-resistant perioral dermatitis  Known previous allergies: none  Standardized panels  [x] Standard panel (40 tests)  [x] Preservatives & Antimicrobials (31 tests)  [x] Emulsifiers & Additives (25 tests)   [x] Perfumes/Flavours & Plants (25 tests)  [x] Hairdresser panel (12 tests)  [] Rubber Chemicals (22 tests)  [] Plastics (26 tests)  [x] Colorants/Dyes/Food additives (20 tests)  [] Metals (implants/dental) (24 tests)  [] Local anaesthetics/NSAIDs (13 tests)  [] Antibiotics & Antimycotics (14 tests)   [] Corticosteroids (15 tests)   [] Photopatch test (62 tests)   [] Others:     [] Patient's Own Products:   DO NOT test if chemical or biological identity is unknown!   always ask from patient the product information and safety sheets (MSDS)       Order for PRICK TESTS    Reason for tests (suspected allergy): atopy?  Known previous allergies: none    Standardized prick panels  [x] Atopic panel (20 tests)  [] Pediatric Panel (12 tests)  [] Milk, Meat, Eggs, Grains (20 tests)   [] Dust, Epithelia, Feathers (10 tests)  [] Fish, Seafood, Shellfish (17 tests)  [] Nuts, Beans (14 tests)  [] Spice, Vegetable, Fruit (17 tests)  [] Pollen Panel = Tree, Grass, Weed (24 tests)  [] Others:     [] Patient's Own Products:   DO NOT test if chemical or biological identity is unknown!   always ask from patient the product information and safety sheets (MSDS)     Standardized intradermal tests  [] Alternaria alternata  [] Aspergillus fumigatus  [] Cladosporium herbarum   [] Penicillium notatum  [] Dermatophagoides farinae  [] Dermatophagoides pteronyssinus  [] Dog Epithelium  [] Cat Epithelium  [] Others:     [] Bee venom   [] Wasp venom  !!Specific  protocol with dilutions!!       Order for Drug allergy tests (prick & intradermal & patch tests)    [] Penicillin G     [] Ampicillin   [] Cefazolin        [] Ceftriaxone     [] Ceftazidime     [] Cefepime     [] Cefuroxime  [] Bactrim  [] Iodixanol     [] Iopamidol        [] Iohexol  [] Others:   [] Patient's Own:   Order for ... as test date      ____________________________________________      Atopy Screen (Placed Sep 25, 2024)  No Substance Readings (15 min) Evaluation   POS Histamine 1mg/ml +/++    NEG NaCl 0.9% -      No Substance Readings (15 min) Evaluation   1 Alternaria alternata (tenuis)  +    2 Cladosporium herbarum +    3 Aspergillus fumigatus -    4 Penicillium notatum -    5 Dermatophagoides pteronyssinus ++    6 Dermatophagoides farinae +++    7 Dog epithelium (canis spp) +    8 Cat hair (tiffanie catus) ++    9 Cockroach   (Blatella americana & germanica) -    10 Grass mix midwest   (Estefani, Orchard, Redtop, Agus) -    11 Jonh grass (sorghum halepense) -    12 Weed mix   (common Cocklebur, Lamb s quarters, rough redroot Pigweed, Dock/Sorrel) -    13 Mug wort (artemisia vulgare) +    14 Ragweed giant/short (ambrosia spp) +++    15 White birch (Betula papyrifera) +++    16 Tree mix 1 (Pecan, Maple BHR, Oak RVW, american [place], black [place]) +    17 Red cedar (juniperus virginia) -    18 Tree mix 2   (white Nabil, river/red Birch, black Runnells, common [place], american Elm) +/++    19 Box elder/Maple mix (acer spp) -    20 McMinn shagbark (carya ovata) -    Conclusion:      ____________________________________________      RESULTS & EVALUATION of PATCH TESTS    Mar 31, 2025 application of patch tests:    Patch test readings after     [x] 2 days, [x] 3 days [] 4 days, [] 5 days,  Other duration: ...      STANDARD Series                                          # Substance 2 days 3 days remarks     1 Igor Mix III 10% - -       2 Colophony - -       3  2-Mercaptobenzothiazole  - -       4  Methylisothiazolinone - -       5 Carba Mix - -       6 Thiuram Mix [A] - -       7 Bisphenol A Epoxy Resin - -       8 H-Rxiv-Lbjfnodjqdn-Formaldehyde Resin - -       9 Mercapto Mix [A] - -       10 Black Rubber Mix- PPD [B] - -       11 Potassium Dichromate  -  -       12 Balsam of Peru (Myroxylon Pereirae Resin) - -       13 Nickel Sulphate Hexahydrate - +       14 Mixed Dialkyl Thiourea - -       15 Paraben Mix [B] - -       16 Methyldibromo Glutaronitrile - -       17 Fragrance Mix 8% - -       18 2-Bromo-2-Nitropropane-1,3-Diol (Bronopol) CT - -       19 Lyral - -       20 Tixocortol-21- Pivalate CT - -       21 Diazolidinyl urea (Germall II) - -        22 Methyl Methacrylate - -       23 Cobalt (II) Chloride Hexahydrate - -       24 Fragrance Mix II  - +/++       25 Compositae Mix II - -       26 Benzoyl Peroxide - -       27 Bacitracin - -       28 Formaldehyde - -       29 Methylchloroisothiazolinone / Methylisothiazolinone - -       30 Corticosteroid Mix CT - -       31 Sodium Lauryl Sulfate - -       32 Lanolin Alcohol - -       33 Turpentine - -       34 Cetylstearylalcohol - -       35 Chlorhexidine Dicluconate - -       36 Budesonide - -       37 Imidazolidinyl Urea  - -       38 Ethyl-2 Cyanoacrylate - -       39 Quaternium 15 (Dowicil 200) - -       40 Decyl Glucoside - -     Compositae Mix II - common yarrow, mountain arnica, Italian chamomile, feverfew, and the common tansy   PRESERVATIVES & ANTIMICROBIALS        # Substance 2 days 3 days remarks   41 1  1,2-Benzisothiazoline-3-One, Sodium Salt - -     2  1,3,5-Kushal (2-Hydroxyethyl) - Hexahydrotriazine (Grotan BK) - -     3 8-Yxdallbkqgmrz-6-Nitro-1, 3-Propanediol NA NA     4  3, 4, 4' - Triclocarban - -    45 5 4 - Chloro - 3 - Cresol - -     6 4 - Chloro - 4 - Xylenol (PCMX) - -     7 7-Ethylbicyclooxazolidine (Bioban [id number]) - -     8 Benzalkonium Chloride CT - -     9 Benzyl Alcohol - -    50 10 Cetalkonium Chloride - -     11 Cetylpyrimidine  Chloride  - -     12 Chloroacetamide - -     13 DMDM Hydantoin - -     14 Glutaraldehyde - -    55 15 Triclosan - -     16 Glyoxal Trimeric Dihydrate - -     17 Iodopropynyl Butylcarbamate - -     18 Octylisothiazoline - -     19 Acetophenone Azine - -    60 20 Bioban P 1487 (Nitrobutyl) Morpholine/(Ethylnitro-Trimethylene) Dimorpholine - -     21 Phenoxyethanol - -     22 Phenyl Salicylate - -     23 Povidone Iodine - -     24 Sodium Benzoate - -    65 25 Sodium Disulfite - -     26 Sorbic Acid - -     27 Thimerosal - -     28 Melamine Formaldehyde Resin - -     29 Ethylenediamine Dihydrochloride - -      Parabens      70 30 Butyl-P-Hydroxybenzoate - -     31 Ethyl-P-Hydroxybenzoate - -     32 Methyl-P-Hydroxybenzoate - -    73 33 Propyl-P-Hydroxybenzoate - -    EMULSIFIERS & ADDITIVES       # Substance 2 days 3 days remarks   74 1 Polyethylene Glycol-400 - -    75 2 Cocamidopropyl Betaine - -    76 3 Amerchol L101 ? -     4 Propylene Glycol - -     5 Triethanolamine - -     6 Sorbitane Sesquiolate CT - -    80 7 Isopropylmyristate - -     8 Polysorbate 80 CT - -     9 Amidoamine   (Stearamidopropyl Dimethylamine) - -     10 Oleamidopropyl Dimethylamine - -     11 Lauryl Glucoside - -    85 12 Coconut Diethanolamide  - -     13 2-Hydroxy-4-Methoxy Benzophenone (Oxybenzone) - -     14 Benzophenone-4 (Sulisobenzon) - -     15 Propolis - +     16 Dexpanthenol - -    90 17 Abitol (Hydroabietyl Alcohol) - -     18 Tert-Butylhydroquinone - -     19 Benzyl Salicylate - -     20 Dimethylaminopropylamin (DMPA) - -     21 Zinc Pyrithione (Zinc Omadine)  - -    95 22 Kushal(Hydroxymethyl) Nitromethane  - -      Antioxidant       23 Dodecyl Gallate - -     24 Butylhydroxyanisole (BHA) - -     25 Butylhydroxytoluene (BHT) - -     26 Di-Alpha-Tocopherol (Vit E) - -    100 27 Propyl Gallate - -    PERFUMES, FLAVORS & PLANTS        # Substance 2 days 3 days remarks   101 1 Benzyl Cinnamate - -     2 Di-Limonene (Dipentene) - -     3  Cananga Odorata (Felicitas Polk) (I) - -     4 Lichen Acid Mix - -    105 5 Mentha Piperita Oil (Peppermint Oil) - -     6 Sesquiterpenelactone mix - -     7 Tea Tree Oil, Oxidized - -     8 Wood Tar Mix - +     9 Abietic Acid - -    110 10 Lavendula Angustifolia Oil (Lavender Oil) - -     11 Fragrance mix II CT * 14% - -      Fragrance Mix I       12 Oakmoss Absolute - -     13 Eugenol - -     14 Geraniol - -    115 15 Hydroxycitronellal - -     16 Isoeugenol - -     17 Cinnamic Aldehyde - -     18 Cinnamic Alcohol  - -      Fragrance mix II       19 Citronellol - -    120 20 Alpha-Hexylcinnamic Aldehyde    - -     21 Citral - -     22 Farnesol - -    123 23 Coumarin - -    Hexylcinnamic aldehyde, Coumarin, Farnesol, Hydroxyisohexy3-cyclohexene carboxaldehyde, citral, citrolellol  HAIRDRESSER        # Substance 2 days 3 days remarks   124 1 P-Phenylenediamine -  -    125 2 P-Aminodiphenylamine - -     3 P-Toluenediamine Sulphate  -  -     4 Ammonium Thioglycolate - -     5 Ammonium Persulfate - -     6 Resorcinol - -    130 7 3-Aminophenol - -     8 P-Aminophenol - -     9 Glyceryl Monothioglycolate - -    133 10 Pyrogallol - -    COLORANTS, DYES, FOOD ADDITIVES   # Substance 2 days 3 days remarks     Textile dyes      134 1 Michel Brown R - -    135 2 Disperse Blue-3 - -     3 Disperse Orange-3 - -     4 Disperse Red 1 - 1% - -     5 Disperse Yellow-9 1% - -     6 Disperse blue mix 106/124 - -    140 7 Disperse yellow 3 - -     8 Naphthol AS - -     9 Disperse Red 11 - -     10 Reactive Black 5 - -     11 Disperse Red 17 - -    145 12 Acid Yellow 61 - -     13 Acid Red 118 - -     14 Disperse Blue 35 - -     15 Basic Red 46 - -      Food dyes/additives       16 Metanil yellow (F/T) - -    150 17 Cochineal Red (F/T) - -     18 Brilliant Black - -     19 Erythrosine-B (F/T) - -     20 Aspartame - -     21 Sullivan (F/T) - -    155 22 Quinoline Yellow - -     23 Azorubine - -    157 24 Tartrazine - -       Results of  patch tests:                         Interpretation:  - Negative                    A    = Allergic      (+) Erythema    TI   = Toxic/irritant   + E + Infiltration    RaP = Relevance at Present     ++ E/I + Papulovesicle   Rpr  = Relevance Previously     +++ E/I/P + Blister     nR   = No Relevance      [] No relevant allergic reaction observed    [x] Allergic reaction diagnosed against following allergens:  Metals: +Nickel Sulphate Hexahydrate   Fragrance: +/++ Fragrance Mix II  Additives: +Wood Tar Mix  + Propolis       Interpretation/Remarks:   Patient is certainly atopic, which goes along with dry hypersensitive and hyperirritable skin which explains one of her problems. 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 Resolved.    These conclusions are made at the best of one's knowledge and belief based on the provided evidence such as patient's history  and allergy test results and they can change over time or can be incomplete because of missing information.    ==> Treatment Plan:    >> Follow the recommendations of the CAMP dudley, using only products recommended on the dudley on skin and hair.   - Daily moisturization is encouraged, as it is important to protect the skin barrier.  - Recommend patient check CAMP dudley before using any prescribed topicals, and then if the topical is not recommended, the patient should reach out to the prescribing provider to discuss an alternative treatment.    - Encouraged daily moisturization of face and body with a gentle moisturizer, such as Vanicream, Cetaphil, or CeraVe.  - Continue with CeraVe facial wash.  - Continue with Elidel: apply BID on face, hands, and rest of body if skin is red.   - Continue with Protopic BID on hands.  - Continue MetroCream prescribed by [facility], and then Elidel QPM.  - If Protopic or Elidel are not effective, can consider Opzelura in the future. At this time, try avoiding allergens and continue use of Protopic and Elidel.     >> For house dust mite allergy:  - Continue with for house dust mite reduction.  - Can take Claritin or Zyrtec before she goes to bed.       Procedures Performed: None    Staff Involved: Provider, Staff, and Scribe    Scribe Disclosure:   I, Calixto Lamb, am serving as a scribe to document services personally performed by See Hawkins MD based on data collection and the provider's statements to me.     Staff Physician Comments:  I was present with the scribe who participated in the documentation of the note. I have verified the history and personally performed the physical exam and medical decision making. I agree with the assessment and plan as documented in the note. I have reviewed and if necessary amended the note.      See Hawkins MD  Professor  Head of Dermato-Allergy Division  Department of Dermatology  [facility],  [place]     Follow-up in Derm-Allergy clinic for if necessary  - not currently scheduled to follow up with any dermatology provider via [facility] (was self-referred)   ___________________________    I spent a total of 32 minutes with Bess Rossbeckytremaine during today s  visit. This time was spent discussing all the individual test results, correlating them to the clinical relevance, counseling the patient and/or coordinating care. Moreover time was spent to install and explain the CAMP [place] from [employer] [employer] with the informations on the individual allergens and propositions of products that can be used. Please see Assessment and Plan for additional details.         Again, thank you for allowing me to participate in the care of your patient.        Sincerely,        See Hawkins MD    Electronically signed

## 2025-04-03 NOTE — PATIENT INSTRUCTIONS
You can always access your most recent office visit note with this allergy clinic via [facility] MyChart, which contains all of your results from testing performed by Dr. Hawkins along with the details of the discussed treatment plan.  If you do not have access to [facility], please discuss with a [place] staff member. Additionally, if your provider is within [place] or their EMR participates in the [employer]) network, they can also access this information.     ____________________________________________    Interpretation/Remarks:   Patint is certain atopic which goes along with dry hyerp skin which explain 1 problem. 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  DDx: partially irritant dermatitis with atopy  DDx: Allergic contact dermatitis to fragrance and natural disinfectant propolis  * chronic illness with exacerbation, progression, side effects from treatment     # Atopic predisposition with:   Slight morning rhinitis with strong dust mite sensitization  Seasonal rhinitis in spring (birch pollen) and fall (ragweed and mug wort)  Nickel allergy  Rhinitis around cats --> prick test positive  * stable chronic illness     # Subacute to chronic eczema on back of hands  DDx: Irritant dermatitis with atopy  DDx: Allergic contact dermatitis   * chronic illness with exacerbation, progression, side effects from treatment     # 1x episode of urticaria in 1/2024. Resolved.     These conclusions are  made at the best of one's knowledge and belief based on the provided evidence such as patient's history and allergy test results and they can change over time or can be incomplete because of missing information.     ==> Treatment Plan:     >> Follow the recommendations of the CAMP dudley, using only products recommended on the dudley on skin and hair.   - Daily moisturization is encouraged, as it is important to protect the skin barrier.  - Recommend patient check CAMP dudley before using any prescribed topicals, and then if the topical is not recommended, the patient should reach out to the prescribing provider to discuss an alternative treatment.     - Encouraged daily moisturization of face and body with a gentle moisturizer, such as Vanicream, Cetaphil, or CeraVe.  - Continue with CeraVe facial wash.  - Continue with Elidel: apply BID on face, hands, and rest of body if skin is red.   - Continue with Protopic BID on hands.  - Continue MetroCream prescribed by [facility], and then Elidel QPM.  - If Protopic or Elidel are not effective, can consider Opzelura in the future. At this time, try avoiding allergens and continue use of Protopic and Elidel.      >> For house dust mite allergy:  - Continue with for house dust mite reduction.  - Can take Claritin or Zyrtec before she goes to bed.        Follow-up in [facility] clinic for as needed  - not currently scheduled to follow up with any dermatology provider via [facility] (was self-referred)

## 2025-04-03 NOTE — NURSING NOTE
Chief Complaint   Patient presents with    Allergy Testing Followup     Patch day 4     Trisha Clayton RN

## 2025-04-03 NOTE — PROGRESS NOTES
[facility] Dermato-allergology Note  Office visit  Encounter Date: Apr 3, 2025  ____________________________________________    CC: Allergy Testing Followup (Patch day 4)      HPI:  (Apr 3, 2025)  Ms. Bess Ellis is a(n) 35 year old female who presents today as a return patient for allergy tests as planned  - Follow-up in Derm-Allergy clinic for 2nd readings and final conclusions after 3 days   - Feels like eczema on hands is always there   - Otherwise feeling well in usual state of health    Physical Exam:  General: In no acute distress, well-developed, well-nourished  Eyes: no conjunctivitis  ENT: no signs of rhinitis   Pulmonary: no wheezing or coughing  Skin: Focused examination of the skin on test sites was performed = see test results below  No active eczematous skin lesions on tests sites, particularly back    Earlier History and Allergy Exams:  (Apr 2, 2025)  Presents today as a return patient for allergy tests as planned  - Follow-up in Derm-Allergy clinic for 1st readings of patch tests after 2 days     (Mar 31, 2025)  - Follow-up in Derm-Allergy clinic for patch tests as planned for week of 3/31/25  - She states her face is doing much better, but she still has some itching   - She continues to use Elidel for the face, not everyday  - She is also using Protopic for her hands    Focused examination of the face and hands was performed.  - Dry red skin on the knuckles  - Slight redness around the nose    (Mar 12, 2025)  - Follow-up in Derm-Allergy clinic in spring 2025 for allergy tests as planned, with check-in (can be virtual or in person) 2 weeks before to determine if patch tests are still necessary  - Reports she is doing better  - Had been putting on topicals (including Elidel) consistently every day until start of 2025, and then has gradually lessened use to just PRN  - Still having some itchiness and irritation on hands, face (around eyes and mouth), just not as severe  - Using  "Elidel on face  - Was using Elidel on hands, but switched to Protopic     Skin: Focused examination of the face and hands was performed.  - Little bit of erythema with some papules on the base of the nose.  - On the back of the hands over the knuckles mostly, there is xerosis.     (Sep 25, 2024)  New patient for allergy consultation  - Self-referred primarily for spreading hives, but also for perioral dermatitis and eczema  - No prior allergy records in [facility]  - However, multiple claims-derived visit encounters (without actual visit notes) in Epic from 1/17/19 to 1/25/24 with Dr. A[facility]ha [facility] ([facility] or various dermatology concerns, with rash and dermatitis as dx starting 6/10/19  - Additional claims-derived visit on 3/28/24 with Dr. Zion Zabala ([facility]) for idiopathic urticaria, dermatitis, pruritus, and dyshidrosis  - Then 5/6/24 and 6/24/24 claims-derived visits with Dr. Lacie Hinds ([facility]) for idiopathic urticaria, perioral dermatitis, pruritus, and dyshidrosis  - Starting 1/2024  - Face, eyes, mouth, neck, and chest, abdomen, and later on occasionally on legs  - Would have tiny little bumps that became really itchy and red  - Would last for a couple days  - Denies skin being dry or scaly  - Also has hand eczema  - Also has perioral dermatitis  - Has tried Protopic (face), steroids, antihistamines, doxycycline, and metronidazole  - 1st tried topical steroids on face  - Then doxycycline  - When stopped doxycycline, couldn't sleep at night with itchy face  - At that time told to stop topical steroids on face, and given 2nd course of doxycycline: BID x 1 month, and the QD x 1 month  - Started 3rd month of doxycyline, has started to taper off, and sometimes feels a little itchy  - Uses CeraVe face wash; does not use BPO (tried years ago and had a reaction)  - Last dyed her hair ~1 year ago  - Has chronically dry eyes  - Skin care routine is \"basic\": includes CeraVe as above, " tretinoin, vitamin C and eye creams    Focused examination of the face and hands was performed.  - Today, on her hands, there is a slightly erythematous xerotic 5 cm x 3 cm plaque on right dorsal palm, and slightly less on left hand. Back of hands are xerotic.   - 1/2/2024 photos on patient's phone: Papules around the mouth on the chin area.  - 5/2/24 photo on patient's phone: Clear perioral dermatitis with typical pustules around both corners of the mouth. She reports she heard about treatment for atopic dermatitis with Cherry's soap and azelaic acid, which she was using at this time.    Past Medical History:   Patient Active Problem List   Diagnosis    IUD contraception    Other irritable bowel syndrome    ASCUS with positive high risk HPV cervical    Acne vulgaris    Dysmenorrhea    Food protein induced enterocolitis syndrome    Constipation, unspecified constipation type    Dyspareunia in female    Mild major depression     Past Medical History:   Diagnosis Date    Abnormal Pap smear of cervix 02/28/2018 02/28/18: see problem list.    Cervical high risk HPV (human papillomavirus) test positive 03/05/2019 03/05/19: See problem list.     Depressive disorder 2021     Allergies:  No Known Allergies    Medications:  Current Outpatient Medications   Medication Sig Dispense Refill    Cholecalciferol (VITAMIN D-3 PO) Take 125 mcg by mouth daily      Cyanocobalamin (B-12 PO) Take 1 tablet by mouth daily      hydrocortisone 2.5 % cream Apply 1 Application topically as needed      levonorgestrel (MIRENA) 20 MCG/DAY IUD 1 each by Intrauterine route once (Liletta IUD - placed at [facility] in 11/21)      Magnesium 300 MG CAPS Take 1 tablet by mouth daily      metroNIDAZOLE (METROCREAM) 0.75 % external cream       Omega-3 Fatty Acids (FISH OIL) 1200 MG capsule Take 1,200 mg by mouth daily      pimecrolimus (ELIDEL) 1 % external cream Apply topically 2 times daily. On face (is less greasy than  Protopic) and therefore better for dermatitis in face 30 g 2    Probiotic Product (PROBIOTIC DAILY PO)       spironolactone (ALDACTONE) 50 MG tablet Take 50 mg by mouth daily Through dermatology      tacrolimus (PROTOPIC) 0.1 % external ointment       tretinoin (RETIN-A) 0.01 % external gel Apply topically At Bedtime       No current facility-administered medications for this visit.     Family History:  Family History   Problem Relation Age of Onset    Cancer Mother 57        CLL, donig well, no txt    Gastrointestinal Disease Mother         IBS    Other Cancer Mother         CLL    Gastrointestinal Disease Sister         IBS    Depression Sister     Anxiety Disorder Sister     Cancer Maternal Grandmother 65        CML, had txt,  at 90, unsure cause of death    Coronary Artery Disease Maternal Grandfather         later in life    Colon Cancer Paternal Grandmother         60s    Coronary Artery Disease Paternal Grandfather         later in life    Colon Cancer Paternal Grandfather         60s    Hypertension Father     Cerebrovascular Disease Father     Anxiety Disorder Father     Glaucoma No family hx of     Macular Degeneration No family hx of    Positive for allergies. Negative for eczema and asthma.    Previous Labs, Allergy Tests, Dermatopathology, Imaging:  See HPI - no records in Epic as of 24.    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 Does not wear gloves at work.  Patient has the following hobbies or non-occupational exposure: weightlifting (will apply , she is unsure of material) and dancing.    Order for Future Allergy Testing:    [x] Outpatient  [] Inpatient: [facility]t..../ Bed ...    Skin Atopy (atopic dermatitis)? [x] Yes     [] No  Comments: diagnosed by outside dermatology - see HPI  Childhood eczema?   [] Yes     [x] No  Comments:   Hand eczema?   [x] Yes     [] No  If yes, leading hand? [] Right      [] Left     [] Ambidextrous  Comments: see HPI    Contact allergies?   [x] Yes     [] No  Comments:   Including adhesives/bandages? [] Yes     [x] No  Comments:   Including metals?   [x] Yes     [] No  Comments: earrings: sometimes would have itchy ears with slight redness and swelling when wearing cheap jewelry    Drug allergies?   [] Yes     [x] No  Comments:     Angioedema?   [] Yes     [] No  Comments:     Urticaria?   [x] Yes     [] No  Comments: see HPI - reason for visit    Food allergies?   [] Yes     [x] No  Comments: eats cherries daily without mouth/throat itching    Pet allergies?   [] Yes     [x] No  Comments: just got a lab retriever puppy 2 days ago; does not have symptoms around; sneezing if she touches cats    Environmental allergy symptoms?  [] Conjunctivitis  [] Otitis  [] Pharyngitis  [] Polyposis  [] Postnasal Drip  [x] Rhinitis - slightly stuffy  [] Sinusitis  [] None  Comments:     HENT Operations?  [] Adenoids [] Septum [] Sinus  [] Tonsils        [] Other:   [] None  Comments:     Pulmonary symptoms (from birth to present)?  [] Asthma bronchiale  Inhaler(s)?:   [] Coughing  [] Other  [x] None  Comments:     Environmental and pulmonary symptoms aggravated by?  Season: [] None     [] I     [] II     [x] III     [x] IV     [x] V     [] VI          [] VII     [x] VIII     [x] IX     [x] X     [] XI     [] XII     [] Perennial  Time of Day: [] None     [x] Morning (once moving to new house recently)     [] Noon     [] Evening     [] Night     [] Whole Day  Location/Changes: [] None     [] Inside     [] Outside     [] Mountain     [] Sea     [] Other:   Triggers (specific): [] None     [] Animals     [] Dust     [] Mold     [] Plants     [] Other:   Triggers (other): [] None     [] Psyche     [] Sport     [] Work     [] Other:   Irritant: [] None     [] Cold     [] Heat     [] Odors     [] Physical Efforts     [] Smoke     [] Other:     Order for PATCH TESTS  Reason for tests (suspected allergy):  recurrent therapy-resistant perioral dermatitis  Known previous allergies: none  Standardized panels  [x] Standard panel (40 tests)  [x] Preservatives & Antimicrobials (31 tests)  [x] Emulsifiers & Additives (25 tests)   [x] Perfumes/Flavours & Plants (25 tests)  [x] Hairdresser panel (12 tests)  [] Rubber Chemicals (22 tests)  [] Plastics (26 tests)  [x] Colorants/Dyes/Food additives (20 tests)  [] Metals (implants/dental) (24 tests)  [] Local anaesthetics/NSAIDs (13 tests)  [] Antibiotics & Antimycotics (14 tests)   [] Corticosteroids (15 tests)   [] Photopatch test (62 tests)   [] Others:     [] Patient's Own Products:   DO NOT test if chemical or biological identity is unknown!   always ask from patient the product information and safety sheets (MSDS)       Order for PRICK TESTS    Reason for tests (suspected allergy): atopy?  Known previous allergies: none    Standardized prick panels  [x] Atopic panel (20 tests)  [] Pediatric Panel (12 tests)  [] Milk, Meat, Eggs, Grains (20 tests)   [] Dust, Epithelia, Feathers (10 tests)  [] Fish, Seafood, Shellfish (17 tests)  [] Nuts, Beans (14 tests)  [] Spice, Vegetable, Fruit (17 tests)  [] Pollen Panel = Tree, Grass, Weed (24 tests)  [] Others:     [] Patient's Own Products:   DO NOT test if chemical or biological identity is unknown!   always ask from patient the product information and safety sheets (MSDS)     Standardized intradermal tests  [] Alternaria alternata  [] Aspergillus fumigatus  [] Cladosporium herbarum   [] Penicillium notatum  [] Dermatophagoides farinae  [] Dermatophagoides pteronyssinus  [] Dog Epithelium  [] Cat Epithelium  [] Others:     [] Bee venom   [] Wasp venom  !!Specific protocol with dilutions!!       Order for Drug allergy tests (prick & intradermal & patch tests)    [] Penicillin G     [] Ampicillin   [] Cefazolin        [] Ceftriaxone     [] Ceftazidime     [] Cefepime     [] Cefuroxime  [] Bactrim  [] Iodixanol     [] Iopamidol         [] Iohexol  [] Others:   [] Patient's Own:   Order for ... as test date      ____________________________________________      Atopy Screen (Placed Sep 25, 2024)  No Substance Readings (15 min) Evaluation   POS Histamine 1mg/ml +/++    NEG NaCl 0.9% -      No Substance Readings (15 min) Evaluation   1 Alternaria alternata (tenuis)  +    2 Cladosporium herbarum +    3 Aspergillus fumigatus -    4 Penicillium notatum -    5 Dermatophagoides pteronyssinus ++    6 Dermatophagoides farinae +++    7 Dog epithelium (canis spp) +    8 Cat hair (tiffanie catus) ++    9 Cockroach   (Blatella americana & germanica) -    10 Grass mix midwest   (Estefani, Orchard, Redtop, Agus) -    11 Jonh grass (sorghum halepense) -    12 Weed mix   (common Cocklebur, Lamb s quarters, rough redroot Pigweed, Dock/Sorrel) -    13 Mug wort (artemisia vulgare) +    14 Ragweed giant/short (ambrosia spp) +++    15 White birch (Betula papyrifera) +++    16 Tree mix 1 (Pecan, Maple BHR, Oak RVW, american [place], black [place]) +    17 Red cedar (juniperus virginia) -    18 Tree mix 2   (white Nabil, river/red Birch, black [place], common [place], american Elm) +/++    19 Box elder/Maple mix (acer spp) -    20 Birch Tree shagbark (carya ovata) -    Conclusion:      ____________________________________________      RESULTS & EVALUATION of PATCH TESTS    Mar 31, 2025 application of patch tests:    Patch test readings after     [x] 2 days, [x] 3 days [] 4 days, [] 5 days,  Other duration: ...      STANDARD Series                                          # Substance 2 days 3 days remarks     1 Igor Mix III 10% - -       2 Colophony - -       3  2-Mercaptobenzothiazole  - -       4 Methylisothiazolinone - -       5 Carba Mix - -       6 Thiuram Mix [A] - -       7 Bisphenol A Epoxy Resin - -       8 D-Ugxl-Wtgcnbpehah-Formaldehyde Resin - -       9 Mercapto Mix [A] - -       10 Black Rubber Mix- PPD [B] - -       11 Potassium Dichromate  -  -       12 Balsam  of [place] (Myroxylon Pereirae Resin) - -       13 Nickel Sulphate Hexahydrate - +       14 Mixed Dialkyl Thiourea - -       15 Paraben Mix [B] - -       16 Methyldibromo Glutaronitrile - -       17 Fragrance Mix 8% - -       18 2-Bromo-2-Nitropropane-1,3-Diol (Bronopol) CT - -       19 Lyral - -       20 Tixocortol-21- Pivalate CT - -       21 Diazolidinyl urea (Germall II) - -        22 Methyl Methacrylate - -       23 Cobalt (II) Chloride Hexahydrate - -       24 Fragrance Mix II  - +/++       25 Compositae Mix II - -       26 Benzoyl Peroxide - -       27 Bacitracin - -       28 Formaldehyde - -       29 Methylchloroisothiazolinone / Methylisothiazolinone - -       30 Corticosteroid Mix CT - -       31 Sodium Lauryl Sulfate - -       32 Lanolin Alcohol - -       33 Turpentine - -       34 Cetylstearylalcohol - -       35 Chlorhexidine Dicluconate - -       36 Budesonide - -       37 Imidazolidinyl Urea  - -       38 Ethyl-2 Cyanoacrylate - -       39 Quaternium 15 (Dowicil 200) - -       40 Decyl Glucoside - -     Compositae Mix II - common yarrow, mountain arnica, Setswana chamomile, feverfew, and the common tansy   PRESERVATIVES & ANTIMICROBIALS        # Substance 2 days 3 days remarks   41 1  1,2-Benzisothiazoline-3-One, Sodium Salt - -     2  1,3,5-Kushal (2-Hydroxyethyl) - Hexahydrotriazine (Grotan BK) - -     3 8-Fdunjsalvheul-6-Nitro-1, 3-Propanediol NA NA     4  3, 4, 4' - Triclocarban - -    45 5 4 - Chloro - 3 - Cresol - -     6 4 - Chloro - 4 - Xylenol (PCMX) - -     7 7-Ethylbicyclooxazolidine (Bioban [id number]) - -     8 Benzalkonium Chloride CT - -     9 Benzyl Alcohol - -    50 10 Cetalkonium Chloride - -     11 Cetylpyrimidine Chloride  - -     12 Chloroacetamide - -     13 DMDM Hydantoin - -     14 Glutaraldehyde - -    55 15 Triclosan - -     16 Glyoxal Trimeric Dihydrate - -     17 Iodopropynyl Butylcarbamate - -     18 Octylisothiazoline - -     19 Acetophenone Azine - -    60 20 Mariia P   (Nitrobutyl) Morpholine/(Ethylnitro-Trimethylene) Dimorpholine - -     21 Phenoxyethanol - -     22 Phenyl Salicylate - -     23 Povidone Iodine - -     24 Sodium Benzoate - -    65 25 Sodium Disulfite - -     26 Sorbic Acid - -     27 Thimerosal - -     28 Melamine Formaldehyde Resin - -     29 Ethylenediamine Dihydrochloride - -      Parabens      70 30 Butyl-P-Hydroxybenzoate - -     31 Ethyl-P-Hydroxybenzoate - -     32 Methyl-P-Hydroxybenzoate - -    73 33 Propyl-P-Hydroxybenzoate - -    EMULSIFIERS & ADDITIVES       # Substance 2 days 3 days remarks   74 1 Polyethylene Glycol-400 - -    75 2 Cocamidopropyl Betaine - -    76 3 Amerchol L101 ? -     4 Propylene Glycol - -     5 Triethanolamine - -     6 Sorbitane Sesquiolate CT - -    80 7 Isopropylmyristate - -     8 Polysorbate 80 CT - -     9 Amidoamine   (Stearamidopropyl Dimethylamine) - -     10 Oleamidopropyl Dimethylamine - -     11 Lauryl Glucoside - -    85 12 Coconut Diethanolamide  - -     13 2-Hydroxy-4-Methoxy Benzophenone (Oxybenzone) - -     14 Benzophenone-4 (Sulisobenzon) - -     15 Propolis - +     16 Dexpanthenol - -    90 17 Abitol (Hydroabietyl Alcohol) - -     18 Tert-Butylhydroquinone - -     19 Benzyl Salicylate - -     20 Dimethylaminopropylamin (DMPA) - -     21 Zinc Pyrithione (Zinc Omadine)  - -    95 22 Kushal(Hydroxymethyl) Nitromethane  - -      Antioxidant       23 Dodecyl Gallate - -     24 Butylhydroxyanisole (BHA) - -     25 Butylhydroxytoluene (BHT) - -     26 Di-Alpha-Tocopherol (Vit E) - -    100 27 Propyl Gallate - -    PERFUMES, FLAVORS & PLANTS        # Substance 2 days 3 days remarks   101 1 Benzyl Cinnamate - -     2 Di-Limonene (Dipentene) - -     3 Cananga Odorata (Ylang Ylang) (I) - -     4 Lichen Acid Mix - -    105 5 Mentha Piperita Oil (Peppermint Oil) - -     6 Sesquiterpenelactone mix - -     7 Tea Tree Oil, Oxidized - -     8 Wood Tar Mix - +     9 Abietic Acid - -    110 10 Lavendula Angustifolia Oil (Lavender  Oil) - -     11 Fragrance mix II CT * 14% - -      Fragrance Mix I       12 [place] Absolute - -     13 Eugenol - -     14 Geraniol - -    115 15 Hydroxycitronellal - -     16 Isoeugenol - -     17 Cinnamic Aldehyde - -     18 Cinnamic Alcohol  - -      Fragrance mix II       19 Citronellol - -    120 20 Alpha-Hexylcinnamic Aldehyde    - -     21 Citral - -     22 Farnesol - -    123 23 Coumarin - -    Hexylcinnamic aldehyde, Coumarin, Farnesol, Hydroxyisohexy3-cyclohexene carboxaldehyde, citral, citrolellol  HAIRDRESSER        # Substance 2 days 3 days remarks   124 1 P-Phenylenediamine -  -    125 2 P-Aminodiphenylamine - -     3 P-Toluenediamine Sulphate  -  -     4 Ammonium Thioglycolate - -     5 Ammonium Persulfate - -     6 Resorcinol - -    130 7 3-Aminophenol - -     8 P-Aminophenol - -     9 Glyceryl Monothioglycolate - -    133 10 Pyrogallol - -    COLORANTS, DYES, FOOD ADDITIVES   # Substance 2 days 3 days remarks     Textile dyes      134 1 Michel Brown R - -    135 2 Disperse Blue-3 - -     3 Disperse Orange-3 - -     4 Disperse Red 1 - 1% - -     5 Disperse Yellow-9 1% - -     6 Disperse blue mix 106/124 - -    140 7 Disperse yellow 3 - -     8 Naphthol AS - -     9 Disperse Red 11 - -     10 Reactive Black 5 - -     11 Disperse Red 17 - -    145 12 Acid Yellow 61 - -     13 Acid Red 118 - -     14 Disperse Blue 35 - -     15 Basic Red 46 - -      Food dyes/additives       16 Metanil yellow (F/T) - -    150 17 Cochineal Red (F/T) - -     18 Brilliant Black - -     19 Erythrosine-B (F/T) - -     20 Aspartame - -     21 [place] (F/T) - -    155 22 Quinoline Yellow - -     23 Azorubine - -    157 24 Tartrazine - -       Results of patch tests:                         Interpretation:  - Negative                    A    = Allergic      (+) Erythema    TI   = Toxic/irritant   + E + Infiltration    RaP = Relevance at Present     ++ E/I + Papulovesicle   Rpr  = Relevance Previously     +++ E/I/P + Blister      nR   = No Relevance      [] No relevant allergic reaction observed    [x] Allergic reaction diagnosed against following allergens:  Metals: +Nickel Sulphate Hexahydrate   Fragrance: +/++ Fragrance Mix II  Additives: +Wood Tar Mix  + Propolis       Interpretation/Remarks:   Patient is certainly atopic, which goes along with dry hypersensitive and hyperirritable skin which explains one of her problems. 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 Resolved.    These conclusions are made at the best of one's knowledge and belief based on the provided evidence such as patient's history and allergy test results and they can change over time or can be incomplete because of missing information.    ==> Treatment Plan:    >> Follow the recommendations of the CAMP dudley, using only products recommended on the dudley on skin and hair.   - Daily moisturization is  encouraged, as it is important to protect the skin barrier.  - Recommend patient check CAMP dudley before using any prescribed topicals, and then if the topical is not recommended, the patient should reach out to the prescribing provider to discuss an alternative treatment.    - Encouraged daily moisturization of face and body with a gentle moisturizer, such as Vanicream, Cetaphil, or CeraVe.  - Continue with CeraVe facial wash.  - Continue with Elidel: apply BID on face, hands, and rest of body if skin is red.   - Continue with Protopic BID on hands.  - Continue MetroCream prescribed by [facility], and then Elidel QPM.  - If Protopic or Elidel are not effective, can consider Opzelura in the future. At this time, try avoiding allergens and continue use of Protopic and Elidel.     >> For house dust mite allergy:  - Continue with for house dust mite reduction.  - Can take Claritin or Zyrtec before she goes to bed.       Procedures Performed: None    Staff Involved: Provider, Staff, and Scribe    Scribe Disclosure:   I, Calixto Lamb, am serving as a scribe to document services personally performed by See Hawkins MD based on data collection and the provider's statements to me.     Staff Physician Comments:  I was present with the scribe who participated in the documentation of the note. I have verified the history and personally performed the physical exam and medical decision making. I agree with the assessment and plan as documented in the note. I have reviewed and if necessary amended the note.      See Hawkins MD  Professor  Head of Dermato-Allergy Division  Department of Dermatology  [facility]     Follow-up in Derm-Allergy clinic for if necessary  - not currently scheduled to follow up with any dermatology provider via [facility] (was self-referred)   ___________________________    I spent a total of 32 minutes with Bess Ellis during today s  visit. This  time was spent discussing all the individual test results, correlating them to the clinical relevance, counseling the patient and/or coordinating care. Moreover time was spent to install and explain the CAMP [place] from [employer] with the informations on the individual allergens and propositions of products that can be used. Please see Assessment and Plan for additional details.

## 2025-04-16 DIAGNOSIS — Z79.899 ENCOUNTER FOR EYE EXAM DUE TO HIGH RISK MEDICATION: Primary | ICD-10-CM

## 2025-04-16 NOTE — PROGRESS NOTES
HPI:  Patient presents for follow up of high risk medication and dry eyes.     Social history: Bought a house in 08/2025.     Pertinent Medical History:  Irritable bowl syndrome  Chlamydia    Ocular History:  LASIK, both eyes. 2013  Dry Eye Syndrome, both eyes.   Possible family history of glaucoma - maybe one of the grandparents.   Corazon HILLS 11/27/2018  Myopia, both eyes.   Small peripheral chorioretinal scar, left eye.     Eye Medications:  Retin-A (started on 03/19/2021)  Restasis both eyes.     Assessment and Plan:    #   High Risk Medication - Using Retin-A since 03/19/2021  Visual field 04/22/2025: Both eyes: normal  Optic Nerve OCT 04/22/2025: Right eye: borderline IN, stable; Left eye: no rnfl defects  There is no papilledema seen on exam.  Deferred repeat VF.  Recommend annual dilated eye exam with visual field and optic nerve OCT.     #   Keratitis Sicca/Meibomian Gland Dysfunction, both eyes.   Discussed Xiidra and serum tears previously.   Patient discontinued restasis due concerns for allergies.    Preservative free artificial tears 4 times daily both eyes. Refresh or systane with omega 3/flaxseed oil. Can use up to every 2 hours both eyes as needed.   Fish oil/omega 3 - 2 grams per oral daily.   Warm compress (with dry eye mask) at bedtime, both eyes.   Patient is being seen in allergy clinic. There is eczema with atopic predisposition. Tried protopic and doxycycline.    #   Allergic Conjunctivitis, both eyes.   Signs and symptoms of allergic conjunctivitis includes itching and discharge.   Pataday extra strength once daily, both eyes.     #   H/O LASIK, both eyes.   Patient had a consultation - can do PRK and not repeat LASIK. Patient is thinking about it.     #   Myopia, both eyes.   Distance only glasses as needed. Monitor.     #   Peripheral Chorioretinal Scars, left eye.   Stable - monitor.         Patient consented to a dilated eye exam:  No.     Medical History:  Past Medical History:    Diagnosis Date    Abnormal Pap smear of cervix 02/28/2018 02/28/18: see problem list.    Cervical high risk HPV (human papillomavirus) test positive 03/05/2019 03/05/19: See problem list.     Depressive disorder 2021       Medications:  Current Outpatient Medications   Medication Sig Dispense Refill    Cholecalciferol (VITAMIN D-3 PO) Take 125 mcg by mouth daily      Cyanocobalamin (B-12 PO) Take 1 tablet by mouth daily      hydrocortisone 2.5 % cream Apply 1 Application topically as needed      levonorgestrel (MIRENA) 20 MCG/DAY IUD 1 each by Intrauterine route once (Liletta IUD - placed [facility] in 11/21)      Magnesium 300 MG CAPS Take 1 tablet by mouth daily      metroNIDAZOLE (METROCREAM) 0.75 % external cream       Omega-3 Fatty Acids (FISH OIL) 1200 MG capsule Take 1,200 mg by mouth daily      pimecrolimus (ELIDEL) 1 % external cream Apply topically 2 times daily. On face (is less greasy than Protopic) and therefore better for dermatitis in face 30 g 2    Probiotic Product (PROBIOTIC DAILY PO)       spironolactone (ALDACTONE) 50 MG tablet Take 50 mg by mouth daily Through dermatology      tacrolimus (PROTOPIC) 0.1 % external ointment       tretinoin (RETIN-A) 0.01 % external gel Apply topically At Bedtime     Complete documentation of historical and exam elements from today's encounter can be found in the full encounter summary report (not reduplicated in this progress note). I personally obtained the chief complaint(s) and history of present illness.  I confirmed and edited as necessary the review of systems, past medical/surgical history, family history, social history, and examination findings as documented by others; and I examined the patient myself. I personally reviewed the relevant tests, images, and reports as documented above. I formulated and edited as necessary the assessment and plan and discussed the findings and management plan with the patient and family. Marlo Cano  Pricilla, OD

## 2025-04-22 ENCOUNTER — OFFICE VISIT (OUTPATIENT)
Dept: OPHTHALMOLOGY | Facility: CLINIC | Age: 35
End: 2025-04-22
Attending: OPTOMETRIST
Payer: COMMERCIAL

## 2025-04-22 DIAGNOSIS — H10.13 ALLERGIC CONJUNCTIVITIS OF BOTH EYES: ICD-10-CM

## 2025-04-22 DIAGNOSIS — H16.223 KERATITIS SICCA, BOTH EYES: Primary | ICD-10-CM

## 2025-04-22 DIAGNOSIS — Z79.899 ENCOUNTER FOR EYE EXAM DUE TO HIGH RISK MEDICATION: ICD-10-CM

## 2025-04-22 DIAGNOSIS — H04.123 DRY EYE SYNDROME OF BOTH EYES: ICD-10-CM

## 2025-04-22 DIAGNOSIS — H31.092 PERIPHERAL CHORIORETINAL SCARS OF LEFT EYE: ICD-10-CM

## 2025-04-22 PROCEDURE — 92083 EXTENDED VISUAL FIELD XM: CPT | Performed by: OPTOMETRIST

## 2025-04-22 PROCEDURE — 92133 CPTRZD OPH DX IMG PST SGM ON: CPT | Performed by: OPTOMETRIST

## 2025-04-22 PROCEDURE — 99213 OFFICE O/P EST LOW 20 MIN: CPT | Performed by: OPTOMETRIST

## 2025-04-22 RX ORDER — OLOPATADINE HYDROCHLORIDE 2 MG/ML
1 SOLUTION/ DROPS OPHTHALMIC DAILY
Qty: 15 ML | Refills: 11 | Status: SHIPPED | OUTPATIENT
Start: 2025-04-22

## 2025-04-22 ASSESSMENT — REFRACTION_MANIFEST
OS_CYLINDER: +0.25
OD_CYLINDER: SPHERE
OD_SPHERE: -2.00
OS_AXIS: 079
OS_SPHERE: -2.25

## 2025-04-22 ASSESSMENT — CONF VISUAL FIELD
OS_INFERIOR_NASAL_RESTRICTION: 0
OD_INFERIOR_NASAL_RESTRICTION: 0
OD_SUPERIOR_NASAL_RESTRICTION: 0
OD_SUPERIOR_TEMPORAL_RESTRICTION: 0
OD_NORMAL: 1
OS_INFERIOR_TEMPORAL_RESTRICTION: 0
METHOD: COUNTING FINGERS
OS_NORMAL: 1
OS_SUPERIOR_TEMPORAL_RESTRICTION: 0
OD_INFERIOR_TEMPORAL_RESTRICTION: 0
OS_SUPERIOR_NASAL_RESTRICTION: 0

## 2025-04-22 ASSESSMENT — REFRACTION_WEARINGRX
OD_CYLINDER: +0.50
OS_CYLINDER: SPHERE
SPECS_TYPE: SVL
OD_AXIS: 160
OD_SPHERE: -1.50
OS_SPHERE: -1.25

## 2025-04-22 ASSESSMENT — VISUAL ACUITY
OD_CC+: +2
CORRECTION_TYPE: GLASSES
OS_CC+: -1/+2
METHOD: SNELLEN - LINEAR
OD_CC: 20/40
OD_PH_CC+: -1
OS_CC: 20/25
OD_PH_CC: 20/20

## 2025-04-22 ASSESSMENT — EXTERNAL EXAM - LEFT EYE: OS_EXAM: NORMAL

## 2025-04-22 ASSESSMENT — EXTERNAL EXAM - RIGHT EYE: OD_EXAM: NORMAL

## 2025-04-22 ASSESSMENT — SLIT LAMP EXAM - LIDS
COMMENTS: MEIBOMIAN GLAND DYSFUNCTION
COMMENTS: MEIBOMIAN GLAND DYSFUNCTION

## 2025-04-22 ASSESSMENT — TONOMETRY
OS_IOP_MMHG: 11
IOP_METHOD: ICARE
OD_IOP_MMHG: 11

## 2025-04-22 ASSESSMENT — CUP TO DISC RATIO
OD_RATIO: 0.15
OS_RATIO: 0.2

## 2025-04-22 ASSESSMENT — PATIENT HEALTH QUESTIONNAIRE - PHQ9: SUM OF ALL RESPONSES TO PHQ QUESTIONS 1-9: 0

## 2025-04-22 NOTE — NURSING NOTE
"Chief Complaints and History of Present Illnesses   Patient presents with    Monitor Current High Risk Meds     Keratitis Sicca/Meibomian Gland Dysfunction, both eyes  High Risk Medication - (Using Retin-A since 03/19/2021)     Chief Complaint(s) and History of Present Illness(es)       Monitor Current High Risk Meds              Comments: Keratitis Sicca/Meibomian Gland Dysfunction, both eyes  High Risk Medication - (Using Retin-A since 03/19/2021)              Comments    Patient states off Restasis now. Was having allergy issues, taking fish oil and flax seed supplements. Better with dry eye issues. Taking tears and warm compresses as needed. No ocular pain. Patient is wondering if \"stye\" is starting up. Patient wondering if vision may be different due to being off Cyclosporine now, when asked if vision has changed since last year. Wondering about being a candidate for refractive surgery in the future. No DM.    Enid Light on 4/22/2025 at 8:22 AM                     "

## 2025-05-05 NOTE — TELEPHONE ENCOUNTER
DIAGNOSIS: BILATERAL hip pain, RIGHT is worse / SELF / [facility]  / No imaigng done     APPOINTMENT DATE: 5/7/25   NOTES STATUS DETAILS   MEDICATION LIST Internal

## 2025-05-07 ENCOUNTER — OFFICE VISIT (OUTPATIENT)
Dept: ORTHOPEDICS | Facility: CLINIC | Age: 35
End: 2025-05-07
Payer: COMMERCIAL

## 2025-05-07 ENCOUNTER — PRE VISIT (OUTPATIENT)
Dept: ORTHOPEDICS | Facility: CLINIC | Age: 35
End: 2025-05-07

## 2025-05-07 ENCOUNTER — ANCILLARY PROCEDURE (OUTPATIENT)
Dept: GENERAL RADIOLOGY | Facility: CLINIC | Age: 35
End: 2025-05-07
Attending: FAMILY MEDICINE
Payer: COMMERCIAL

## 2025-05-07 DIAGNOSIS — M25.551 BILATERAL HIP PAIN: Primary | ICD-10-CM

## 2025-05-07 DIAGNOSIS — M25.551 BILATERAL HIP PAIN: ICD-10-CM

## 2025-05-07 DIAGNOSIS — M25.851 RIGHT HIP IMPINGEMENT SYNDROME: ICD-10-CM

## 2025-05-07 DIAGNOSIS — M25.552 BILATERAL HIP PAIN: Primary | ICD-10-CM

## 2025-05-07 DIAGNOSIS — M25.552 BILATERAL HIP PAIN: ICD-10-CM

## 2025-05-07 PROCEDURE — 73522 X-RAY EXAM HIPS BI 3-4 VIEWS: CPT | Performed by: RADIOLOGY

## 2025-05-07 NOTE — PROGRESS NOTES
Sports Medicine Clinic Visit    PCP: Tati Gruber    Bess Ellis is a 35 year old female who is seen  as self referral presenting with R>L hip pain    Injury: Notes lunging at the ball while playing rec soccer about 5 years ago, and felt a twinge in her right deep groin.     Bothersome for the last 5 years.  Patient no longer running or playing soccer.  Patient involved in a regular weightlifting program for the lower extremities including Olympic style squats, lunges, cable exercises, single-leg activities.    Patient saw physical therapy at [place] this past December with exercises that focused on strength about the hip and buttock.    Now involved in weightlifting    Location of Pain: bilateral anterior hips  Duration of Pain: Right : 5 years,  Left: 2 week(s)  Rating of Pain: 4/10  Pain is better with: PT (this past December 2024), chiro, massage  Pain is worse with: Constant   Additional Features: pain   Treatment so far consists of: PT, chiro, massage  Prior History of related problems: None    There were no vitals taken for this visit.        [facility] an [facility]  Past Medical History:   Diagnosis Date    Abnormal Pap smear of cervix 02/28/2018 02/28/18: see problem list.    Cervical high risk HPV (human papillomavirus) test positive 03/05/2019 03/05/19: See problem list.     Depressive disorder 2021   Surgical History    Surgical History  Surgery Date Site/Laterality Comments   ORAL SURGERY PROCEDURE 2011   [place] teeth    ANESTHESIA EYE NOT OTHERWISE SPECIFIED 8/2013   lasix bilateral        Active problem list:  Patient Active Problem List   Diagnosis    IUD contraception    Other irritable bowel syndrome    ASCUS with positive high risk HPV cervical    Acne vulgaris    Dysmenorrhea    Food protein induced enterocolitis syndrome    Constipation, unspecified constipation type    Dyspareunia in female    Mild major depression       FH:  Family History    Problem Relation Age of Onset    Cancer Mother 57        CLL, donig well, no txt    Gastrointestinal Disease Mother         IBS    Other Cancer Mother         CLL    Gastrointestinal Disease Sister         IBS    Depression Sister     Anxiety Disorder Sister     Cancer Maternal Grandmother 65        CML, had txt,  at 90, unsure cause of death    Coronary Artery Disease Maternal Grandfather         later in life    Colon Cancer Paternal Grandmother         60s    Coronary Artery Disease Paternal Grandfather         later in life    Colon Cancer Paternal Grandfather         60s    Hypertension Father     Cerebrovascular Disease Father     Anxiety Disorder Father     Glaucoma No family hx of     Macular Degeneration No family hx of        SH:  Social History     Socioeconomic History    Marital status: Single     Spouse name: Not on file    Number of children: Not on file    Years of education: Not on file    Highest education level: Not on file   Occupational History     Comment: Abuse advocate at [employer]   Tobacco Use    Smoking status: Never    Smokeless tobacco: Never   Vaping Use    Vaping status: Never Used   Substance and Sexual Activity    Alcohol use: Yes     Comment: 1-2 times a week couple drinks     Drug use: No    Sexual activity: Yes     Partners: Male     Birth control/protection: Condom, I.U.D.   Other Topics Concern    Parent/sibling w/ CABG, MI or angioplasty before 65F 55M? No   Social History Narrative    Not on file     Social Drivers of Health     Financial Resource Strain: Low Risk  (2024)    Financial Resource Strain     Within the past 12 months, have you or your family members you live with been unable to get utilities (heat, electricity) when it was really needed?: No   Food Insecurity: Low Risk  (2024)    Food Insecurity     Within the past 12 months, did you worry that your food would run out before you got money to buy more?: No     Within the past 12 months, did the food you  bought just not last and you didn t have money to get more?: No   Transportation Needs: Low Risk  (7/11/2024)    Transportation Needs     Within the past 12 months, has lack of transportation kept you from medical appointments, getting your medicines, non-medical meetings or appointments, work, or from getting things that you need?: No   Physical Activity: Sufficiently Active (7/11/2024)    Exercise Vital Sign     Days of Exercise per Week: 6 days     Minutes of Exercise per Session: 50 min   Stress: No Stress Concern Present (7/11/2024)    [employer] [place] [employer] - Occupational Stress Questionnaire     Feeling of Stress : Only a little   Social Connections: Unknown (7/11/2024)    Social Connection and Isolation Panel [NHANES]     Frequency of Communication with Friends and Family: Not on file     Frequency of Social Gatherings with Friends and Family: Three times a week     Attends Latter day Services: Not on file     Active Member of Clubs or Organizations: Not on file     Attends Club or Organization Meetings: Not on file     Marital Status: Not on file   Interpersonal Safety: Low Risk  (7/16/2024)    Interpersonal Safety     Do you feel physically and emotionally safe where you currently live?: Yes     Within the past 12 months, have you been hit, slapped, kicked or otherwise physically hurt by someone?: No     Within the past 12 months, have you been humiliated or emotionally abused in other ways by your partner or ex-partner?: No   Housing Stability: Low Risk  (7/11/2024)    Housing Stability     Do you have housing? : Yes     Are you worried about losing your housing?: No       MEDS:  See EMR, reviewed  ALL:  See EMR, reviewed    REVIEW OF SYSTEMS:  CONSTITUTIONAL:NEGATIVE for fever, chills, change in weight  INTEGUMENTARY/SKIN: NEGATIVE for worrisome rashes, moles or lesions  EYES: NEGATIVE for vision changes or irritation  ENT/MOUTH: NEGATIVE for ear, mouth and throat problems  RESP:NEGATIVE  for significant cough or SOB  BREAST: NEGATIVE for masses, tenderness or discharge  CV: NEGATIVE for chest pain, palpitations or peripheral edema  GI: NEGATIVE for nausea, abdominal pain, heartburn, or change in bowel habits  :NEGATIVE for frequency, dysuria, or hematuria  :NEGATIVE for frequency, dysuria, or hematuria  NEURO: NEGATIVE for weakness, dizziness or paresthesias  ENDOCRINE: NEGATIVE for temperature intolerance, skin/hair changes  HEME/ALLERGY/IMMUNE: NEGATIVE for bleeding problems  PSYCHIATRIC: NEGATIVE for changes in mood or affect        Objective: She has normal range of motion of the bilateral hip to internal rotation external rotation and abduction.  FADIR test is mildly uncomfortable on the right but negative on the left.  Patient has decreased figure-of-four position to the table flexibility on the left compared to increased figure-of-four position to the table flexibility on the right.  However GRAEME test is negative bilaterally with no discomfort.  Nontender over the ASIS, nontender over the greater trochanter.  Nontender over the SI joint bilaterally and Spring testing of the lumbar spine is without discomfort.  Patient can complete a 1 and 2 legged squat with good form.  Patient can do so without pain.  Sensation is normal distally.  Appropriate in conversation and affect.    Personally viewed the patient x-rays of the bilateral hips that show no significant degenerative joint disease or bony abnormality.    Assessment: Recurrent bilateral hip impingement complaints right greater than left    Plan: We discussed options including referral to a sports physical therapist, such as Aisha Woodruff at [place], specifically for hip impingement protocol.  We discussed alternative options such as referral to a manual physical therapist for options such as SI joint manual therapy.  Patient is considering these both options and referral information was provided.  If PT is not helpful we  discussed the option of a right-sided MRI of the hip with intra-articular contrast injection to rule out labral abnormality.  Patient agrees to return for the MRI if physical therapy is not helpful.  We did discuss that MRIs of the hip in this age range are challenging as they can often show labral findings.  We discussed that an MRI of the hip that shows labral findings could be followed by an empiric cortisone shot in the hip to see if it provides specific relief of hip impingement pain.  Patient had no further questions and will follow-up as needed.

## 2025-05-07 NOTE — LETTER
5/7/2025      RE: Bess Ellis  [address] [facility] [address]     Dear Colleague,    Thank you for referring your patient, Bess Ellis, to the [facility] [place]. Please see a copy of my visit note below.    Sports Medicine Clinic Visit    PCP: Tati Gruber    Bess Ellis is a 35 year old female who is seen  as self referral presenting with R>L hip pain    Injury: Notes lunging at the ball while playing rec soccer about 5 years ago, and felt a twinge in her right deep groin.     Bothersome for the last 5 years.  Patient no longer running or playing soccer.  Patient involved in a regular weightlifting program for the lower extremities including Olympic style squats, lunges, cable exercises, single-leg activities.    Patient saw physical therapy at [place] this past December with exercises that focused on strength about the hip and buttock.    Now involved in weightlifting    Location of Pain: bilateral anterior hips  Duration of Pain: Right : 5 years,  Left: 2 week(s)  Rating of Pain: 4/10  Pain is better with: PT (this past December 2024), chiro, massage  Pain is worse with: Constant   Additional Features: pain   Treatment so far consists of: PT, chiro, massage  Prior History of related problems: None    There were no vitals taken for this visit.        [facility] an [facility]  Past Medical History:   Diagnosis Date     Abnormal Pap smear of cervix 02/28/2018 02/28/18: see problem list.     Cervical high risk HPV (human papillomavirus) test positive 03/05/2019 03/05/19: See problem list.      Depressive disorder 2021   Surgical History    Surgical History  Surgery Date Site/Laterality Comments   ORAL SURGERY PROCEDURE 2011   [place] teeth    ANESTHESIA EYE NOT OTHERWISE SPECIFIED 8/2013   lasix bilateral        Active problem list:  Patient Active Problem List   Diagnosis     IUD contraception     Other irritable bowel  syndrome     ASCUS with positive high risk HPV cervical     Acne vulgaris     Dysmenorrhea     Food protein induced enterocolitis syndrome     Constipation, unspecified constipation type     Dyspareunia in female     Mild major depression       FH:  Family History   Problem Relation Age of Onset     Cancer Mother 57        CLL, donig well, no txt     Gastrointestinal Disease Mother         IBS     Other Cancer Mother         CLL     Gastrointestinal Disease Sister         IBS     Depression Sister      Anxiety Disorder Sister      Cancer Maternal Grandmother 65        CML, had txt,  at 90, unsure cause of death     Coronary Artery Disease Maternal Grandfather         later in life     Colon Cancer Paternal Grandmother         60s     Coronary Artery Disease Paternal Grandfather         later in life     Colon Cancer Paternal Grandfather         60s     Hypertension Father      Cerebrovascular Disease Father      Anxiety Disorder Father      Glaucoma No family hx of      Macular Degeneration No family hx of        SH:  Social History     Socioeconomic History     Marital status: Single     Spouse name: Not on file     Number of children: Not on file     Years of education: Not on file     Highest education level: Not on file   Occupational History     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   Other Topics Concern     Parent/sibling w/ CABG, MI or angioplasty before 65F 55M? No   Social History Narrative     Not on file     Social Drivers of Health     Financial Resource Strain: Low Risk  (2024)    Financial Resource Strain      Within the past 12 months, have you or your family members you live with been  unable to get utilities (heat, electricity) when it was really needed?: No   Food Insecurity: Low Risk  (7/11/2024)    Food Insecurity      Within the past 12 months, did you worry that your food would run out before you got money to buy more?: No      Within the past 12 months, did the food you bought just not last and you didn t have money to get more?: No   Transportation Needs: Low Risk  (7/11/2024)    Transportation Needs      Within the past 12 months, has lack of transportation kept you from medical appointments, getting your medicines, non-medical meetings or appointments, work, or from getting things that you need?: No   Physical Activity: Sufficiently Active (7/11/2024)    Exercise Vital Sign      Days of Exercise per Week: 6 days      Minutes of Exercise per Session: 50 min   Stress: No Stress Concern Present (7/11/2024)    [employer] [employer] [place] [employer] - Occupational Stress Questionnaire      Feeling of Stress : Only a little   Social Connections: Unknown (7/11/2024)    Social Connection and Isolation Panel [NHANES]      Frequency of Communication with Friends and Family: Not on file      Frequency of Social Gatherings with Friends and Family: Three times a week      Attends Adventism Services: Not on file      Active Member of Clubs or Organizations: Not on file      Attends Club or Organization Meetings: Not on file      Marital Status: Not on file   Interpersonal Safety: Low Risk  (7/16/2024)    Interpersonal Safety      Do you feel physically and emotionally safe where you currently live?: Yes      Within the past 12 months, have you been hit, slapped, kicked or otherwise physically hurt by someone?: No      Within the past 12 months, have you been humiliated or emotionally abused in other ways by your partner or ex-partner?: No   Housing Stability: Low Risk  (7/11/2024)    Housing Stability      Do you have housing? : Yes      Are you worried about losing your housing?: No       MEDS:   See EMR, reviewed  ALL:  See EMR, reviewed    REVIEW OF SYSTEMS:  CONSTITUTIONAL:NEGATIVE for fever, chills, change in weight  INTEGUMENTARY/SKIN: NEGATIVE for worrisome rashes, moles or lesions  EYES: NEGATIVE for vision changes or irritation  ENT/MOUTH: NEGATIVE for ear, mouth and throat problems  RESP:NEGATIVE for significant cough or SOB  BREAST: NEGATIVE for masses, tenderness or discharge  CV: NEGATIVE for chest pain, palpitations or peripheral edema  GI: NEGATIVE for nausea, abdominal pain, heartburn, or change in bowel habits  :NEGATIVE for frequency, dysuria, or hematuria  :NEGATIVE for frequency, dysuria, or hematuria  NEURO: NEGATIVE for weakness, dizziness or paresthesias  ENDOCRINE: NEGATIVE for temperature intolerance, skin/hair changes  HEME/ALLERGY/IMMUNE: NEGATIVE for bleeding problems  PSYCHIATRIC: NEGATIVE for changes in mood or affect        Objective: She has normal range of motion of the bilateral hip to internal rotation external rotation and abduction.  FADIR test is mildly uncomfortable on the right but negative on the left.  Patient has decreased figure-of-four position to the table flexibility on the left compared to increased figure-of-four position to the table flexibility on the right.  However GRAEME test is negative bilaterally with no discomfort.  Nontender over the ASIS, nontender over the greater trochanter.  Nontender over the SI joint bilaterally and Spring testing of the lumbar spine is without discomfort.  Patient can complete a 1 and 2 legged squat with good form.  Patient can do so without pain.  Sensation is normal distally.  Appropriate in conversation and affect.    Personally viewed the patient x-rays of the bilateral hips that show no significant degenerative joint disease or bony abnormality.    Assessment: Recurrent bilateral hip impingement complaints right greater than left    Plan: We discussed options including referral to a sports physical therapist, such as  Aisha Woodruff at [place], specifically for hip impingement protocol.  We discussed alternative options such as referral to a manual physical therapist for options such as SI joint manual therapy.  Patient is considering these both options and referral information was provided.  If PT is not helpful we discussed the option of a right-sided MRI of the hip with intra-articular contrast injection to rule out labral abnormality.  Patient agrees to return for the MRI if physical therapy is not helpful.  We did discuss that MRIs of the hip in this age range are challenging as they can often show labral findings.  We discussed that an MRI of the hip that shows labral findings could be followed by an empiric cortisone shot in the hip to see if it provides specific relief of hip impingement pain.  Patient had no further questions and will follow-up as needed.                                Again, thank you for allowing me to participate in the care of your patient.      Sincerely,    Prince Fagan MD

## 2025-05-14 ENCOUNTER — HOSPITAL ENCOUNTER (OUTPATIENT)
Dept: ULTRASOUND IMAGING | Facility: CLINIC | Age: 35
Discharge: HOME | End: 2025-05-14
Attending: FAMILY MEDICINE
Payer: COMMERCIAL

## 2025-05-14 DIAGNOSIS — T83.32XA IUD STRINGS LOST: ICD-10-CM

## 2025-05-14 PROCEDURE — 76830 TRANSVAGINAL US NON-OB: CPT

## 2025-06-09 SDOH — HEALTH STABILITY: PHYSICAL HEALTH: ON AVERAGE, HOW MANY DAYS PER WEEK DO YOU ENGAGE IN MODERATE TO STRENUOUS EXERCISE (LIKE A BRISK WALK)?: 7 DAYS

## 2025-06-09 SDOH — HEALTH STABILITY: PHYSICAL HEALTH: ON AVERAGE, HOW MANY MINUTES DO YOU ENGAGE IN EXERCISE AT THIS LEVEL?: 60 MIN

## 2025-06-09 ASSESSMENT — SOCIAL DETERMINANTS OF HEALTH (SDOH): HOW OFTEN DO YOU GET TOGETHER WITH FRIENDS OR RELATIVES?: THREE TIMES A WEEK

## 2025-06-11 ENCOUNTER — OFFICE VISIT (OUTPATIENT)
Dept: FAMILY MEDICINE | Facility: CLINIC | Age: 35
End: 2025-06-11
Attending: FAMILY MEDICINE
Payer: COMMERCIAL

## 2025-06-11 VITALS
RESPIRATION RATE: 19 BRPM | OXYGEN SATURATION: 98 % | SYSTOLIC BLOOD PRESSURE: 90 MMHG | DIASTOLIC BLOOD PRESSURE: 60 MMHG | HEART RATE: 74 BPM | WEIGHT: 118 LBS | TEMPERATURE: 97.1 F | BODY MASS INDEX: 23.16 KG/M2 | HEIGHT: 60 IN

## 2025-06-11 DIAGNOSIS — K59.00 CONSTIPATION, UNSPECIFIED CONSTIPATION TYPE: ICD-10-CM

## 2025-06-11 DIAGNOSIS — Z84.2: ICD-10-CM

## 2025-06-11 DIAGNOSIS — Z00.00 ROUTINE GENERAL MEDICAL EXAMINATION AT A HEALTH CARE FACILITY: Primary | ICD-10-CM

## 2025-06-11 DIAGNOSIS — L70.0 ACNE VULGARIS: ICD-10-CM

## 2025-06-11 DIAGNOSIS — K64.4 EXTERNAL HEMORRHOIDS: ICD-10-CM

## 2025-06-11 DIAGNOSIS — Z80.6 FAMILY HISTORY OF LEUKEMIA: ICD-10-CM

## 2025-06-11 DIAGNOSIS — K58.8 OTHER IRRITABLE BOWEL SYNDROME: ICD-10-CM

## 2025-06-11 DIAGNOSIS — M25.851 HIP IMPINGEMENT SYNDROME, RIGHT: ICD-10-CM

## 2025-06-11 DIAGNOSIS — L20.9 ATOPIC DERMATITIS, UNSPECIFIED TYPE: ICD-10-CM

## 2025-06-11 LAB
ERYTHROCYTE [DISTWIDTH] IN BLOOD BY AUTOMATED COUNT: 12.2 % (ref 10–15)
HCT VFR BLD AUTO: 41.4 % (ref 35–47)
HGB BLD-MCNC: 14.6 G/DL (ref 11.7–15.7)
MCH RBC QN AUTO: 30.8 PG (ref 26.5–33)
MCHC RBC AUTO-ENTMCNC: 35.3 G/DL (ref 31.5–36.5)
MCV RBC AUTO: 87 FL (ref 78–100)
PLATELET # BLD AUTO: 186 10E3/UL (ref 150–450)
RBC # BLD AUTO: 4.74 10E6/UL (ref 3.8–5.2)
WBC # BLD AUTO: 4 10E3/UL (ref 4–11)

## 2025-06-11 PROCEDURE — 1126F AMNT PAIN NOTED NONE PRSNT: CPT | Performed by: FAMILY MEDICINE

## 2025-06-11 PROCEDURE — 36415 COLL VENOUS BLD VENIPUNCTURE: CPT | Performed by: FAMILY MEDICINE

## 2025-06-11 PROCEDURE — 3078F DIAST BP <80 MM HG: CPT | Performed by: FAMILY MEDICINE

## 2025-06-11 PROCEDURE — 85027 COMPLETE CBC AUTOMATED: CPT | Performed by: FAMILY MEDICINE

## 2025-06-11 PROCEDURE — 99395 PREV VISIT EST AGE 18-39: CPT | Performed by: FAMILY MEDICINE

## 2025-06-11 PROCEDURE — 80053 COMPREHEN METABOLIC PANEL: CPT | Performed by: FAMILY MEDICINE

## 2025-06-11 PROCEDURE — 3074F SYST BP LT 130 MM HG: CPT | Performed by: FAMILY MEDICINE

## 2025-06-11 ASSESSMENT — PATIENT HEALTH QUESTIONNAIRE - PHQ9
SUM OF ALL RESPONSES TO PHQ QUESTIONS 1-9: 3
10. IF YOU CHECKED OFF ANY PROBLEMS, HOW DIFFICULT HAVE THESE PROBLEMS MADE IT FOR YOU TO DO YOUR WORK, TAKE CARE OF THINGS AT HOME, OR GET ALONG WITH OTHER PEOPLE: SOMEWHAT DIFFICULT
SUM OF ALL RESPONSES TO PHQ QUESTIONS 1-9: 3

## 2025-06-11 ASSESSMENT — ANXIETY QUESTIONNAIRES
GAD7 TOTAL SCORE: 4
3. WORRYING TOO MUCH ABOUT DIFFERENT THINGS: SEVERAL DAYS
GAD7 TOTAL SCORE: 4
7. FEELING AFRAID AS IF SOMETHING AWFUL MIGHT HAPPEN: NOT AT ALL
1. FEELING NERVOUS, ANXIOUS, OR ON EDGE: SEVERAL DAYS
8. IF YOU CHECKED OFF ANY PROBLEMS, HOW DIFFICULT HAVE THESE MADE IT FOR YOU TO DO YOUR WORK, TAKE CARE OF THINGS AT HOME, OR GET ALONG WITH OTHER PEOPLE?: SOMEWHAT DIFFICULT
7. FEELING AFRAID AS IF SOMETHING AWFUL MIGHT HAPPEN: NOT AT ALL
6. BECOMING EASILY ANNOYED OR IRRITABLE: SEVERAL DAYS
IF YOU CHECKED OFF ANY PROBLEMS ON THIS QUESTIONNAIRE, HOW DIFFICULT HAVE THESE PROBLEMS MADE IT FOR YOU TO DO YOUR WORK, TAKE CARE OF THINGS AT HOME, OR GET ALONG WITH OTHER PEOPLE: SOMEWHAT DIFFICULT
GAD7 TOTAL SCORE: 4
2. NOT BEING ABLE TO STOP OR CONTROL WORRYING: SEVERAL DAYS
4. TROUBLE RELAXING: NOT AT ALL
5. BEING SO RESTLESS THAT IT IS HARD TO SIT STILL: NOT AT ALL

## 2025-06-11 ASSESSMENT — PAIN SCALES - GENERAL: PAINLEVEL_OUTOF10: NO PAIN (0)

## 2025-06-11 NOTE — PATIENT INSTRUCTIONS
Patient Education   Preventive Care Advice   This is general advice given by our system to help you stay healthy. However, your care team may have specific advice just for you. Please talk to your care team about your preventive care needs.  Nutrition  Eat 5 or more servings of fruits and vegetables each day.  Try wheat bread, brown rice and whole grain pasta (instead of white bread, rice, and pasta).  Get enough calcium and vitamin D. Check the label on foods and aim for 100% of the RDA (recommended daily allowance).  Lifestyle  Exercise at least 150 minutes each week  (30 minutes a day, 5 days a week).  Do muscle strengthening activities 2 days a week. These help control your weight and prevent disease.  No smoking.  Wear sunscreen to prevent skin cancer.  Have a dental exam and cleaning every 6 months.  Yearly exams  See your health care team every year to talk about:  Any changes in your health.  Any medicines your care team has prescribed.  Preventive care, family planning, and ways to prevent chronic diseases.  Shots (vaccines)   HPV shots (up to age 26), if you've never had them before.  Hepatitis B shots (up to age 59), if you've never had them before.  COVID-19 shot: Get this shot when it's due.  Flu shot: Get a flu shot every year.  Tetanus shot: Get a tetanus shot every 10 years.  Pneumococcal, hepatitis A, and RSV shots: Ask your care team if you need these based on your risk.  Shingles shot (for age 50 and up)  General health tests  Diabetes screening:  Starting at age 35, Get screened for diabetes at least every 3 years.  If you are younger than age 35, ask your care team if you should be screened for diabetes.  Cholesterol test: At age 39, start having a cholesterol test every 5 years, or more often if advised.  Bone density scan (DEXA): At age 50, ask your care team if you should have this scan for osteoporosis (brittle bones).  Hepatitis C: Get tested at least once in your life.  STIs (sexually  transmitted infections)  Before age 24: Ask your care team if you should be screened for STIs.  After age 24: Get screened for STIs if you're at risk. You are at risk for STIs (including HIV) if:  You are sexually active with more than one person.  You don't use condoms every time.  You or a partner was diagnosed with a sexually transmitted infection.  If you are at risk for HIV, ask about PrEP medicine to prevent HIV.  Get tested for HIV at least once in your life, whether you are at risk for HIV or not.  Cancer screening tests  Cervical cancer screening: If you have a cervix, begin getting regular cervical cancer screening tests starting at age 21.  Breast cancer scan (mammogram): If you've ever had breasts, begin having regular mammograms starting at age 40. This is a scan to check for breast cancer.  Colon cancer screening: It is important to start screening for colon cancer at age 45.  Have a colonoscopy test every 10 years (or more often if you're at risk) Or, ask your provider about stool tests like a FIT test every year or Cologuard test every 3 years.  To learn more about your testing options, visit:   .  For help making a decision, visit:   https://bit.ly/uy23404.  Prostate cancer screening test: If you have a prostate, ask your care team if a prostate cancer screening test (PSA) at age 55 is right for you.  Lung cancer screening: If you are a current or former smoker ages 50 to 80, ask your care team if ongoing lung cancer screenings are right for you.  For informational purposes only. Not to replace the advice of your health care provider. Copyright   2023 [employer]. All rights reserved. Clinically reviewed by the [facility] Transitions Program. [employer] [id number] - REV 01/24.

## 2025-06-11 NOTE — PROGRESS NOTES
Answers submitted by the patient for this visit:  Patient Health Questionnaire (Submitted on 6/11/2025)  If you checked off any problems, how difficult have these problems made it for you to do your work, take care of things at home, or get along with other people?: Somewhat difficult  PHQ9 TOTAL SCORE: 3  Patient Health Questionnaire (G7) (Submitted on 6/11/2025)  LUIS 7 TOTAL SCORE: 4      Preventive Care Visit  [facility] Josselyn Gruber MD, Family Medicine  Jun 11, 2025  {Provider  Link to [employer] :156844}    Assessment & Plan     Routine general medical examination at a health care facility  Discussed diet, calcium and exercise.  Thin prep pap was not done.  Eye and dental care UTD or recommended f/u.  Discussed immunizations - declines COVID for now- will consider.  See orders below for tests ordered and screening needed.       Atopic dermatitis, unspecified type  Working with Dr. Hawkins, allergy testing helpful, elidel helpful.  Still with residual hand dermatitis sx's.  Will continue follow-up.    Constipation, unspecified constipation type  Other irritable bowel syndrome  GI consultation, colonoscopy.  Better control with citrucel, magnesium citrate and high fiber diet.    External hemorrhoids  Still with some hemorrhoids, wondering about txt options.  Discussed continued work on constipation best option, can send to colorectal for txt discussion otherwise.    Hip impingement syndrome, right  Few yrs of sx's, sports med consultation, seeing specific PT soon.    Family history of female infertility  Sister going through IVF, SAB x 2.  No other known fam h/o infertility. Pt has IUD now, but had regular though slightly longer period cycles prior (~q5wks).  Consider seeing ob/gyn/infert in future if desired.    Family history of leukemia  - CBC with platelets; Future    Acne vulgaris  - Comprehensive metabolic panel (BMP + Alb, Alk Phos, ALT, AST, Total. Bili, TP);  Future    {Patient advised of split billing (Optional):332149}        Counseling  Appropriate preventive services were addressed with this patient via screening, questionnaire, or discussion as appropriate for fall prevention, nutrition, physical activity, Tobacco-use cessation, social engagement, weight loss and cognition.  Checklist reviewing preventive services available has been given to the patient.  Reviewed patient's diet, addressing concerns and/or questions.       {Follow-up (Optional):255836}    Georgie Kellogg is a 35 year old, presenting for the following:  Physical        6/11/2025     9:47 AM   Additional Questions   Roomed by oren ardon   Accompanied by n.a         6/11/2025     9:47 AM   Patient Reported Additional Medications   Patient reports taking the following new medications n/a          HPI    Overall things are good.    Eczema/dermatitis- Dr. Hawkins- identified some things she reacted to- fragrance, things in bees wax.  Wood tar mix, nickel.  Facial dermatitis had cleared up.  Elidel helps eyelid and chin area.  Still has eczema on hands-   Trying to make sure her products are clear of ingrea    Chronic constipation - a lot better.  Miralax, saw GI, did colonoscopy.  Miralax didn't do much.  Now on citrucel powder and mag citrate, eats lots of fiber as well.    Hemorrhoids- for awhile, can feel it, if she is constipated for awhile, and straining, can get worse.  Rare bleeding, 1-2 times.  Will hold off on colorectal referral for now.    R hip - yrs of sx's.  Does wt lifting, lots of walking.  Felt a tweak playing soccer.  Saw sports med and PT.  Hip flexor strengthening exercises- maybe helped a little bit.  Then went to sport med, got xray.  He referred her to different PT - works with female athlete- sees her Friday.    MDD/anxiety- mood is good.  Anxiety is ongoing. 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  When it peaks, she does therapy- needs to find a new one.  Saw EAP at work, would do a few  sessions, then a break.  They moved to a different group.  Gets 8 sessions through work/year.    Has had some yeast infections the last few months.  Tends to happen after sex.  [facility]  The last one, they did two tests- one a culture- first one showed yeast, second one didn't , but definitely cleared w/ diflucan.  Partner also took one,  Slight sx's since then, used boric acid, and it helped.    Wondering about fertility.  Sister is going through IVF, 37 yrs. Two SABs.  Checked her insurance, doesn't cover freezing eggs.  Does cover IVF.     {MA/LPN/RN Pre-Provider Visit Orders- hCG/UA/Strep (Optional):896248}  {SUPERLIST (Optional):568777}  {additonal problems for provider to add (Optional):148437}  Advance Care Planning  {The storyboard will display whether the patient has ACP docs on file. Hover over the Code section in the storyboard to access the ACP documents. :458617}  {(AWV REQUIRED) Advance Care Planning Reviewed:254269}        6/9/2025   General Health   How would you rate your overall physical health? Excellent   Feel stress (tense, anxious, or unable to sleep) Not at all         6/9/2025   Nutrition   Three or more servings of calcium each day? Yes   Diet: Regular (no restrictions)   How many servings of fruit and vegetables per day? 4 or more   How many sweetened beverages each day? 0-1         6/9/2025   Exercise   Days per week of moderate/strenous exercise 7 days   Average minutes spent exercising at this level 60 min         6/9/2025   Social Factors   Frequency of gathering with friends or relatives Three times a week   Worry food won't last until get money to buy more No   Food not last or not have enough money for food? No   Do you have housing? (Housing is defined as stable permanent housing and does not include staying outside in a car, in a tent, in an abandoned building, in an overnight shelter, or couch-surfing.) No   Are you worried about losing  your housing? No   Lack of transportation? No   Unable to get utilities (heat,electricity)? No   Want help with housing or utility concern? No   (!) HOUSING CONCERN PRESENT      6/9/2025   Dental   Dentist two times every year? Yes       Today's PHQ-9 Score:       6/11/2025     9:45 AM   PHQ-9 SCORE   PHQ-9 Total Score MyChart 3 (Minimal depression)   PHQ-9 Total Score 3        Patient-reported         6/9/2025   Substance Use   Alcohol more than 3/day or more than 7/wk No   Do you use any other substances recreationally? No     Social History     Tobacco Use    Smoking status: Never    Smokeless tobacco: Never   Vaping Use    Vaping status: Never Used   Substance Use Topics    Alcohol use: Yes     Comment: 1-2 times a week couple drinks     Drug use: No     {Provider  If there are gaps in the social history shown above, please follow the link to update and then refresh the note Link to Social and Substance History :337726}     {Mammogram Decision Support (Optional):969122}        6/9/2025   STI Screening   New sexual partner(s) since last STI/HIV test? No     History of abnormal Pap smear: YES - reflected in Problem List and Health Maintenance accordingly        Latest Ref Rng & Units 5/24/2023    10:02 AM 5/4/2022     8:52 AM 3/19/2021     9:12 AM   PAP / HPV   PAP  Negative for Intraepithelial Lesion or Malignancy (NILM)  Negative for Intraepithelial Lesion or Malignancy (NILM)     HPV 16 DNA Negative Negative  Negative  Negative    HPV 18 DNA Negative Negative  Negative  Negative    Other HR HPV Negative Negative  Positive  Positive            6/9/2025   Contraception/Family Planning   Questions about contraception or family planning (!) YES - fertility questions     {Provider  REQUIRED FOR AWV Use the storyboard to review patient history, after sections have been marked as reviewed, refresh note to capture documentation:235945}   Reviewed and updated as needed this visit by Provider                     {HISTORY OPTIONS (Optional):153147}    {ROS Picklists (Optional):337884}     Objective    Exam  There were no vitals taken for this visit.   Estimated body mass index is 23.24 kg/m  as calculated from the following:    Height as of 7/16/24: 1.524 m (5').    Weight as of 7/16/24: 54 kg (119 lb).    Physical Exam  {Exam Choices (Optional):261037}        Signed Electronically by: Tati Gruber MD  {Email feedback regarding this note to [email or web address]   :055269}   Electronically by: Tati Gruber MD

## 2025-06-12 ENCOUNTER — RESULTS FOLLOW-UP (OUTPATIENT)
Dept: FAMILY MEDICINE | Facility: CLINIC | Age: 35
End: 2025-06-12

## 2025-06-12 LAB
ALBUMIN SERPL BCG-MCNC: 4.8 G/DL (ref 3.5–5.2)
ALP SERPL-CCNC: 47 U/L (ref 40–150)
ALT SERPL W P-5'-P-CCNC: 22 U/L (ref 0–50)
ANION GAP SERPL CALCULATED.3IONS-SCNC: 9 MMOL/L (ref 7–15)
AST SERPL W P-5'-P-CCNC: 35 U/L (ref 0–45)
BILIRUB SERPL-MCNC: 1.4 MG/DL
BUN SERPL-MCNC: 17.4 MG/DL (ref 6–20)
CALCIUM SERPL-MCNC: 10.1 MG/DL (ref 8.8–10.4)
CHLORIDE SERPL-SCNC: 101 MMOL/L (ref 98–107)
CREAT SERPL-MCNC: 0.91 MG/DL (ref 0.51–0.95)
EGFRCR SERPLBLD CKD-EPI 2021: 85 ML/MIN/1.73M2
GLUCOSE SERPL-MCNC: 65 MG/DL (ref 70–99)
HCO3 SERPL-SCNC: 28 MMOL/L (ref 22–29)
POTASSIUM SERPL-SCNC: 4.6 MMOL/L (ref 3.4–5.3)
PROT SERPL-MCNC: 7 G/DL (ref 6.4–8.3)
SODIUM SERPL-SCNC: 138 MMOL/L (ref 135–145)

## 2025-07-02 ENCOUNTER — MYC REFILL (OUTPATIENT)
Dept: DERMATOLOGY | Facility: CLINIC | Age: 35
End: 2025-07-02
Payer: COMMERCIAL

## 2025-07-02 DIAGNOSIS — L20.89 OTHER ATOPIC DERMATITIS: ICD-10-CM

## 2025-07-02 DIAGNOSIS — L24.9 IRRITANT DERMATITIS: ICD-10-CM

## 2025-07-02 DIAGNOSIS — L23.5 ALLERGIC DERMATITIS DUE TO OTHER CHEMICAL PRODUCT: ICD-10-CM

## 2025-07-02 RX ORDER — PIMECROLIMUS 10 MG/G
CREAM TOPICAL 2 TIMES DAILY
Qty: 30 G | Refills: 2 | Status: SHIPPED | OUTPATIENT
Start: 2025-07-02

## 2025-07-21 ENCOUNTER — THERAPY VISIT (OUTPATIENT)
Dept: PHYSICAL THERAPY | Facility: CLINIC | Age: 35
End: 2025-07-21
Payer: COMMERCIAL

## 2025-07-21 DIAGNOSIS — M25.851 RIGHT HIP IMPINGEMENT SYNDROME: ICD-10-CM

## 2025-07-21 DIAGNOSIS — M25.551 BILATERAL HIP PAIN: Primary | ICD-10-CM

## 2025-07-21 DIAGNOSIS — M25.552 BILATERAL HIP PAIN: Primary | ICD-10-CM

## 2025-07-21 PROCEDURE — 97140 MANUAL THERAPY 1/> REGIONS: CPT | Mod: GP | Performed by: PHYSICAL THERAPIST

## 2025-08-04 ENCOUNTER — THERAPY VISIT (OUTPATIENT)
Dept: PHYSICAL THERAPY | Facility: CLINIC | Age: 35
End: 2025-08-04
Payer: COMMERCIAL

## 2025-08-04 DIAGNOSIS — M25.551 BILATERAL HIP PAIN: Primary | ICD-10-CM

## 2025-08-04 DIAGNOSIS — M25.851 RIGHT HIP IMPINGEMENT SYNDROME: ICD-10-CM

## 2025-08-04 DIAGNOSIS — M25.552 BILATERAL HIP PAIN: Primary | ICD-10-CM

## 2025-08-04 PROCEDURE — 97110 THERAPEUTIC EXERCISES: CPT | Mod: GP | Performed by: PHYSICAL THERAPIST

## 2025-08-04 PROCEDURE — 97140 MANUAL THERAPY 1/> REGIONS: CPT | Mod: GP | Performed by: PHYSICAL THERAPIST
